# Patient Record
Sex: MALE | Race: WHITE | NOT HISPANIC OR LATINO | Employment: UNEMPLOYED | URBAN - METROPOLITAN AREA
[De-identification: names, ages, dates, MRNs, and addresses within clinical notes are randomized per-mention and may not be internally consistent; named-entity substitution may affect disease eponyms.]

---

## 2017-02-11 ENCOUNTER — OFFICE VISIT (OUTPATIENT)
Dept: URGENT CARE | Facility: URGENT CARE | Age: 32
End: 2017-02-11
Payer: COMMERCIAL

## 2017-02-11 VITALS
DIASTOLIC BLOOD PRESSURE: 102 MMHG | HEART RATE: 107 BPM | TEMPERATURE: 99.2 F | OXYGEN SATURATION: 99 % | SYSTOLIC BLOOD PRESSURE: 150 MMHG

## 2017-02-11 DIAGNOSIS — F41.1 ANXIETY STATE: ICD-10-CM

## 2017-02-11 DIAGNOSIS — R00.2 PALPITATIONS: Primary | ICD-10-CM

## 2017-02-11 LAB
ALBUMIN SERPL-MCNC: 3.7 G/DL (ref 3.4–5)
ALP SERPL-CCNC: 53 U/L (ref 40–150)
ALT SERPL W P-5'-P-CCNC: 16 U/L (ref 0–70)
ANION GAP SERPL CALCULATED.3IONS-SCNC: 4 MMOL/L (ref 3–14)
AST SERPL W P-5'-P-CCNC: 23 U/L (ref 0–45)
BILIRUB SERPL-MCNC: 0.5 MG/DL (ref 0.2–1.3)
BUN SERPL-MCNC: 10 MG/DL (ref 7–30)
CALCIUM SERPL-MCNC: 9.6 MG/DL (ref 8.5–10.1)
CHLORIDE SERPL-SCNC: 104 MMOL/L (ref 94–109)
CO2 SERPL-SCNC: 31 MMOL/L (ref 20–32)
CREAT SERPL-MCNC: 0.6 MG/DL (ref 0.66–1.25)
ERYTHROCYTE [DISTWIDTH] IN BLOOD BY AUTOMATED COUNT: 12.5 % (ref 10–15)
GFR SERPL CREATININE-BSD FRML MDRD: >90 ML/MIN/1.7M2
GLUCOSE SERPL-MCNC: 90 MG/DL (ref 70–99)
HCT VFR BLD AUTO: 45.8 % (ref 40–53)
HGB BLD-MCNC: 15.9 G/DL (ref 13.3–17.7)
MCH RBC QN AUTO: 30.1 PG (ref 26.5–33)
MCHC RBC AUTO-ENTMCNC: 34.7 G/DL (ref 31.5–36.5)
MCV RBC AUTO: 87 FL (ref 78–100)
PLATELET # BLD AUTO: 270 10E9/L (ref 150–450)
POTASSIUM SERPL-SCNC: 4 MMOL/L (ref 3.4–5.3)
PROT SERPL-MCNC: 6.6 G/DL (ref 6.8–8.8)
RBC # BLD AUTO: 5.29 10E12/L (ref 4.4–5.9)
SODIUM SERPL-SCNC: 139 MMOL/L (ref 133–144)
WBC # BLD AUTO: 8 10E9/L (ref 4–11)

## 2017-02-11 PROCEDURE — 36415 COLL VENOUS BLD VENIPUNCTURE: CPT | Performed by: PHYSICIAN ASSISTANT

## 2017-02-11 PROCEDURE — 93000 ELECTROCARDIOGRAM COMPLETE: CPT | Performed by: PHYSICIAN ASSISTANT

## 2017-02-11 PROCEDURE — 80053 COMPREHEN METABOLIC PANEL: CPT | Performed by: PHYSICIAN ASSISTANT

## 2017-02-11 PROCEDURE — 99214 OFFICE O/P EST MOD 30 MIN: CPT | Performed by: PHYSICIAN ASSISTANT

## 2017-02-11 PROCEDURE — 85027 COMPLETE CBC AUTOMATED: CPT | Performed by: PHYSICIAN ASSISTANT

## 2017-02-11 PROCEDURE — 84443 ASSAY THYROID STIM HORMONE: CPT | Performed by: PHYSICIAN ASSISTANT

## 2017-02-11 RX ORDER — ALPRAZOLAM 0.5 MG
0.5 TABLET ORAL 3 TIMES DAILY PRN
Qty: 20 TABLET | Refills: 0 | Status: SHIPPED | OUTPATIENT
Start: 2017-02-11 | End: 2019-02-28

## 2017-02-11 NOTE — MR AVS SNAPSHOT
After Visit Summary   2/11/2017    Keo Pinto    MRN: [id number]           Patient Information     Date Of Birth          1985        Visit Information        Provider Department      2/11/2017 5:00 PM Eri Ruffin PA-C [facility] Urgent [facility]        Today's Diagnoses     Palpitations    -  1    Anxiety state           Follow-ups after your visit        Who to contact     If you have questions or need follow up information about today's clinic visit or your schedule please contact [facility] directly at [phone].  Normal or non-critical lab and imaging results will be communicated to you by [employer], letter or phone within 4 business days after the clinic has received the results. If you do not hear from us within 7 days, please contact the clinic through [employer] or phone. If you have a critical or abnormal lab result, we will notify you by phone as soon as possible.  Submit refill requests through Visual Threat or call your pharmacy and they will forward the refill request to us. Please allow 3 business days for your refill to be completed.          Additional Information About Your Visit        MyChart Information     Visual [employer] gives you secure access to your electronic health record. If you see a primary care provider, you can also send messages to your care team and make appointments. If you have questions, please call your primary care clinic.  If you do not have a primary care provider, please call [phone] and they will assist you.        Care EveryWhere ID     This is your Care EveryWhere ID. This could be used by other organizations to access your [place] medical records  [id number]        Your Vitals Were     Pulse Temperature Pulse Oximetry             107 99.2  F (37.3  C) (Oral) 99%          Blood Pressure from Last 3 Encounters:   02/11/17 (!) 150/102   02/17/16 138/82   04/08/15 136/86    Weight from Last 3 Encounters:   02/17/16 (!) 377 lb 6.4 oz  (171.2 kg)   04/08/15 (!) 367 lb (166.5 kg)   02/19/14 (!) 364 lb (165.1 kg)              We Performed the Following     CBC with platelets     Comprehensive metabolic panel (BMP + Alb, Alk Phos, ALT, AST, Total. Bili, TP)     EKG 12-lead complete w/read - Clinics     TSH with free T4 reflex          Where to get your medicines      Some of these will need a paper prescription and others can be bought over the counter.  Ask your nurse if you have questions.     Bring a paper prescription for each of these medications     ALPRAZolam 0.5 MG tablet          Primary Care Provider Office Phone # Fax #    Tian Perez [phone]       [facility] [address] [place] [address]        Thank you!     Thank you for choosing [facility]  for your care. Our goal is always to provide you with excellent care. Hearing back from our patients is one way we can continue to improve our services. Please take a few minutes to complete the written survey that you may receive in the mail after your visit with us. Thank you!             Your Updated Medication List - Protect others around you: Learn how to safely use, store and throw away your medicines at www.disposemymeds.org.          This list is accurate as of: 2/11/17 11:59 PM.  Always use your most recent med list.                   Brand Name Dispense Instructions for use    ALPRAZolam 0.5 MG tablet    XANAX    20 tablet    Take 1 tablet (0.5 mg) by mouth 3 times daily as needed for anxiety       metoprolol 200 MG 24 hr tablet    TOPROL-XL    90 tablet    Take 1 tablet (200 mg) by mouth daily

## 2017-02-12 LAB — TSH SERPL DL<=0.005 MIU/L-ACNC: 2.51 MU/L (ref 0.4–4)

## 2017-02-28 NOTE — PROGRESS NOTES
SUBJECTIVE:  Keo Pinto is a 31 year old male who comes in with anxiety issues.  Has had in the past. Also feels like rapid heartbeat on occasion that last for 10-15 seconds.  Denies chest pain or SOB>  No cold sx recently.  Wants to make sure fine.  No other concerns.      ROS:  Negative other than stated above    Exam:  GENERAL APPEARANCE: healthy, alert and no distress  EYES: EOMI,  PERRL  HENT: ear canals and TM's normal and nose and mouth without ulcers or lesions  RESP: lungs clear to auscultation - no rales, rhonchi or wheezes  CV: regular rates and rhythm, normal S1 S2, no S3 or S4 and no murmur, click or rub -  ABDOMEN:  soft, nontender, no HSM or masses and bowel sounds normal  SKIN: no suspicious lesions or rashes  NEURO: Normal strength and tone, sensory exam grossly normal, mentation intact and speech normal  PSYCH: anxious    EKG - no irregular rhythm noted    Results for orders placed or performed in visit on 02/11/17   CBC with platelets   Result Value Ref Range    WBC 8.0 4.0 - 11.0 10e9/L    RBC Count 5.29 4.4 - 5.9 10e12/L    Hemoglobin 15.9 13.3 - 17.7 g/dL    Hematocrit 45.8 40.0 - 53.0 %    MCV 87 78 - 100 fl    MCH 30.1 26.5 - 33.0 pg    MCHC 34.7 31.5 - 36.5 g/dL    RDW 12.5 10.0 - 15.0 %    Platelet Count 270 150 - 450 10e9/L   Comprehensive metabolic panel (BMP + Alb, Alk Phos, ALT, AST, Total. Bili, TP)   Result Value Ref Range    Sodium 139 133 - 144 mmol/L    Potassium 4.0 3.4 - 5.3 mmol/L    Chloride 104 94 - 109 mmol/L    Carbon Dioxide 31 20 - 32 mmol/L    Anion Gap 4 3 - 14 mmol/L    Glucose 90 70 - 99 mg/dL    Urea Nitrogen 10 7 - 30 mg/dL    Creatinine 0.60 (L) 0.66 - 1.25 mg/dL    GFR Estimate >90 >60 mL/min/1.7m2    GFR Estimate If Black >90 >60 mL/min/1.7m2    Calcium 9.6 8.5 - 10.1 mg/dL    Bilirubin Total 0.5 0.2 - 1.3 mg/dL    Albumin 3.7 3.4 - 5.0 g/dL    Protein Total 6.6 (L) 6.8 - 8.8 g/dL    Alkaline Phosphatase 53 40 - 150 U/L    ALT 16 0 - 70 U/L    AST 23 0 - 45  U/L   TSH with free T4 reflex   Result Value Ref Range    TSH 2.51 0.40 - 4.00 mU/L         assessment/plan  (R00.2) Palpitations  (primary encounter diagnosis)  Comment:   Plan: EKG 12-lead complete w/read - Clinics, CBC with        platelets, Comprehensive metabolic panel (BMP +        Alb, Alk Phos, ALT, AST, Total. Bili, TP), TSH         with free T4 reflex        Labs normal and normal EKG.  Likely anxiety related.  Needs to FU with PCP for further management.  To ER if worsens    (F41.1) Anxiety state  Comment:   Plan: ALPRAZolam (XANAX) 0.5 MG tablet        Med as directed and limited amount given.  No refills in UC.

## 2017-03-05 ENCOUNTER — MYC REFILL (OUTPATIENT)
Dept: PEDIATRICS | Facility: CLINIC | Age: 32
End: 2017-03-05

## 2017-03-05 DIAGNOSIS — I10 HYPERTENSION: ICD-10-CM

## 2017-03-05 RX ORDER — METOPROLOL SUCCINATE 200 MG/1
200 TABLET, EXTENDED RELEASE ORAL DAILY
Qty: 90 TABLET | Refills: 2 | Status: CANCELLED | OUTPATIENT
Start: 2017-03-05

## 2017-03-06 RX ORDER — METOPROLOL SUCCINATE 200 MG/1
200 TABLET, EXTENDED RELEASE ORAL DAILY
Qty: 30 TABLET | Refills: 0 | Status: SHIPPED | OUTPATIENT
Start: 2017-03-06 | End: 2017-03-14

## 2017-03-06 NOTE — TELEPHONE ENCOUNTER
LOV with  was on 04/08/15 for HTN. Need an OV with provider for BP recheck. Sent [facility] message. Will await for pt's response.     Metoprolol 200 mg qd      Last Written Prescription Date: 05/17/16  Last Fill Quantity: 90, # refills: 2    Last Office Visit with [facility], [facility] or [facility] prescribing provider:  02/11/17   Future Office Visit:        BP Readings from Last 3 Encounters:   02/11/17 (!) 150/102   02/17/16 138/82   04/08/15 136/86     Component      Latest Ref Rng & Units 2/19/2014 4/8/2015 2/11/2017   Creatinine      0.66 - 1.25 mg/dL 0.90 0.96 0.60 (L)     Notes from 02/11/17:  Palpitations (primary encounter diagnosis)  Labs normal and normal EKG. Likely anxiety related. Needs to FU with PCP for further management. To ER if worsens    Boris, RN  Triage Nurse

## 2017-03-06 NOTE — TELEPHONE ENCOUNTER
Pt made an appointment with  on 03/14, says that he has only 3 tabs left on hand, requesting enough med to last till his appointment. Sent one month supply to last till his appointment.     Boris, RN  Triage Nurse

## 2017-03-06 NOTE — TELEPHONE ENCOUNTER
Message from [employer]:  Original authorizing provider: MD Keo Wallace would like a refill of the following medications:  metoprolol (TOPROL-XL) 200 MG 24 hr tablet [Tian Perez MD]    Preferred pharmacy: [address], [place] - [address]    Comment:

## 2017-03-14 ENCOUNTER — OFFICE VISIT (OUTPATIENT)
Dept: PEDIATRICS | Facility: CLINIC | Age: 32
End: 2017-03-14
Payer: COMMERCIAL

## 2017-03-14 VITALS
HEART RATE: 104 BPM | RESPIRATION RATE: 20 BRPM | BODY MASS INDEX: 42.66 KG/M2 | SYSTOLIC BLOOD PRESSURE: 148 MMHG | DIASTOLIC BLOOD PRESSURE: 92 MMHG | WEIGHT: 315 LBS | HEIGHT: 72 IN | TEMPERATURE: 98.1 F

## 2017-03-14 DIAGNOSIS — I10 ESSENTIAL HYPERTENSION: Primary | ICD-10-CM

## 2017-03-14 DIAGNOSIS — F41.1 ANXIETY STATE: ICD-10-CM

## 2017-03-14 PROCEDURE — 99213 OFFICE O/P EST LOW 20 MIN: CPT | Performed by: INTERNAL MEDICINE

## 2017-03-14 RX ORDER — METOPROLOL SUCCINATE 200 MG/1
200 TABLET, EXTENDED RELEASE ORAL DAILY
Qty: 90 TABLET | Refills: 0 | Status: SHIPPED | OUTPATIENT
Start: 2017-03-14 | End: 2017-07-13

## 2017-03-14 RX ORDER — LISINOPRIL 10 MG/1
10 TABLET ORAL DAILY
Qty: 90 TABLET | Refills: 0 | Status: SHIPPED | OUTPATIENT
Start: 2017-03-14 | End: 2017-06-11

## 2017-03-14 NOTE — NURSING NOTE
"Chief Complaint   Patient presents with     Hypertension     ER F/U       Initial BP (!) 152/108 (BP Location: Right arm, Patient Position: Chair, Cuff Size: Adult Large)  Pulse 116  Temp 98.1  F (36.7  C) (Oral)  Resp 20  Ht 5' 11.5\" (1.816 m)  Wt (!) 375 lb (170.1 kg)  BMI 51.57 kg/m2 Estimated body mass index is 51.57 kg/(m^2) as calculated from the following:    Height as of this encounter: 5' 11.5\" (1.816 m).    Weight as of this encounter: 375 lb (170.1 kg).  Medication Reconciliation: complete   Carmencita CAREY, MINGO,AAMA      "

## 2017-03-14 NOTE — MR AVS SNAPSHOT
After Visit Summary   3/14/2017    Keo Pinto    MRN: [id number]           Patient Information     Date Of Birth          1985        Visit Information        Provider Department      3/14/2017 10:00 AM [facility] Perez MD [facility]        Today's Diagnoses     Essential hypertension    -  1    Anxiety state          Care Instructions    Continue to take metoprolol 200 mg once per day    Add Lisinopril 10 mg once per day    BP recheck at the end of the month   Optimal BP readings: <130 / <85   Too high: >140 / >90   Very high: >180 / >105    If your BP is still high, the lisinopril dose can be increased to 20 mg per day         Follow-ups after your visit        Who to contact     If you have questions or need follow up information about today's clinic visit or your schedule please contact [facility] directly at [phone].  Normal or non-critical lab and imaging results will be communicated to you by MyChart, letter or phone within 4 business days after the clinic has received the results. If you do not hear from us within 7 days, please contact the clinic through [employer]t or phone. If you have a critical or abnormal lab result, we will notify you by phone as soon as possible.  Submit refill requests through Known or call your pharmacy and they will forward the refill request to us. Please allow 3 business days for your refill to be completed.          Additional Information About Your Visit        MyChart Information     Known gives you secure access to your electronic health record. If you see a primary care provider, you can also send messages to your care team and make appointments. If you have questions, please call your primary care clinic.  If you do not have a primary care provider, please call [phone] and they will assist you.        Care EveryWhere ID     This is your Care EveryWhere ID. This could be used by other organizations to access your  "[place] medical records  [id number]        Your Vitals Were     Pulse Temperature Respirations Height BMI (Body Mass Index)       104 98.1  F (36.7  C) (Oral) 20 5' 11.5\" (1.816 m) 51.57 kg/m2        Blood Pressure from Last 3 Encounters:   03/14/17 (!) 148/92   02/11/17 (!) 150/102   02/17/16 138/82    Weight from Last 3 Encounters:   03/14/17 (!) 375 lb (170.1 kg)   02/17/16 (!) 377 lb 6.4 oz (171.2 kg)   04/08/15 (!) 367 lb (166.5 kg)              Today, you had the following     No orders found for display         Today's Medication Changes          These changes are accurate as of: 3/14/17 10:37 AM.  If you have any questions, ask your nurse or doctor.               Start taking these medicines.        Dose/Directions    lisinopril 10 MG tablet   Commonly known as:  PRINIVIL/ZESTRIL   Used for:  Essential hypertension   Started by:  Tian Perez MD        Dose:  10 mg   Take 1 tablet (10 mg) by mouth daily   Quantity:  90 tablet   Refills:  0            Where to get your medicines      [id number]  [place] - [address] [facility]  [address] [address]     Phone:  [phone]     lisinopril 10 MG tablet    metoprolol 200 MG 24 hr tablet                Primary Care Provider Office Phone # Fax #    Tian Perez [phone]       [address] [place] [address]        Thank you!     Thank you for choosing [facility]  for your care. Our goal is always to provide you with excellent care. Hearing back from our patients is one way we can continue to improve our services. Please take a few minutes to complete the written survey that you may receive in the mail after your visit with us. Thank you!             Your Updated Medication List - Protect others around you: Learn how to safely use, store and throw away your medicines at www.disposemymeds.org.          This list is " accurate as of: 3/14/17 10:37 AM.  Always use your most recent med list.                   Brand Name Dispense Instructions for use    ALPRAZolam 0.5 MG tablet    XANAX    20 tablet    Take 1 tablet (0.5 mg) by mouth 3 times daily as needed for anxiety       lisinopril 10 MG tablet    PRINIVIL/ZESTRIL    90 tablet    Take 1 tablet (10 mg) by mouth daily       metoprolol 200 MG 24 hr tablet    TOPROL-XL    90 tablet    Take 1 tablet (200 mg) by mouth daily

## 2017-03-14 NOTE — PROGRESS NOTES
"  SUBJECTIVE:                                                    Keo Pinto is a 31 year old male who presents to clinic today for the following health issues:      ED/UC Followup:    Facility:  [facility]  Date of visit: 02/11/2017  Reason for visit: tachycardia and hypertensioin  Current Status: Pt still feels like BP is high. He stated that he can't explain the feeling but does sweat a lot     Felt to have anxiety as well.   Has been treated for anxiety w/ prn alprazolam.       Problem list and histories reviewed & adjusted, as indicated.      Labs reviewed in EPIC    Reviewed and updated as needed this visit by clinical staff  Tobacco  Allergies  Med Hx  Surg Hx  Fam Hx  Soc Hx          OBJECTIVE:                                                    BP (!) 148/92  Pulse 104  Temp 98.1  F (36.7  C) (Oral)  Resp 20  Ht 5' 11.5\" (1.816 m)  Wt (!) 375 lb (170.1 kg)  BMI 51.57 kg/m2  Body mass index is 51.57 kg/(m^2).  GEN: no distress  SKIN: no rashes  HEENT: PERRL. EOMI. TM's clear bilaterally. Nasal mucosa normal. OP moist without lesions.  LUNGS: Clear to auscultation bilaterally. No rhonchi, rales, wheezes or retractions.  CV: Regular rhythm, tachycardic rate.  No murmurs, rubs or gallops. Pulses 2+ radial.  ABD: Bowel sounds positive throughout. Soft, nontender, nondistended. No organomegaly. No masses.   ABD: Bowel sounds positive throughout. Soft, nontender, nondistended. No organomegaly. No masses.  EXTR: No edema     ASSESSMENT/PLAN:                                                        ICD-10-CM    1. Essential hypertension I10 metoprolol (TOPROL-XL) 200 MG 24 hr tablet     lisinopril (PRINIVIL/ZESTRIL) 10 MG tablet   2. Anxiety state F41.1      Patient Instructions   Continue to take metoprolol 200 mg once per day    Add Lisinopril 10 mg once per day    BP recheck at the end of the month   Optimal BP readings: <130 / <85   Too high: >140 / >90   Very high: >180 / >105    If your BP is still high, the " lisinopril dose can be increased to 20 mg per day      Tian Perez MD  [facility]

## 2017-03-14 NOTE — PATIENT INSTRUCTIONS
Continue to take metoprolol 200 mg once per day    Add Lisinopril 10 mg once per day    BP recheck at the end of the month   Optimal BP readings: <130 / <85   Too high: >140 / >90   Very high: >180 / >105    If your BP is still high, the lisinopril dose can be increased to 20 mg per day

## 2017-03-17 ENCOUNTER — MYC MEDICAL ADVICE (OUTPATIENT)
Dept: PEDIATRICS | Facility: CLINIC | Age: 32
End: 2017-03-17

## 2017-04-06 ENCOUNTER — MYC REFILL (OUTPATIENT)
Dept: PEDIATRICS | Facility: CLINIC | Age: 32
End: 2017-04-06

## 2017-04-06 DIAGNOSIS — I10 ESSENTIAL HYPERTENSION: ICD-10-CM

## 2017-04-06 RX ORDER — METOPROLOL SUCCINATE 200 MG/1
200 TABLET, EXTENDED RELEASE ORAL DAILY
Qty: 90 TABLET | Refills: 0 | Status: CANCELLED | OUTPATIENT
Start: 2017-04-06

## 2017-04-06 RX ORDER — LISINOPRIL 10 MG/1
10 TABLET ORAL DAILY
Qty: 90 TABLET | Refills: 0 | Status: CANCELLED | OUTPATIENT
Start: 2017-04-06

## 2017-04-06 NOTE — TELEPHONE ENCOUNTER
Not due for refills now, has enough med to last till June. Advised pt to contact pharmacy to pick-up refill.    Boris, RN  Triage Nurse

## 2017-04-06 NOTE — TELEPHONE ENCOUNTER
Message from [email or web address]:  Original authorizing provider: MD Keo Wallace would like a refill of the following medications:  metoprolol (TOPROL-XL) 200 MG 24 hr tablet [Tian Perez MD]  lisinopril (PRINIVIL/ZESTRIL) 10 MG tablet [Tian Perez MD]    Preferred pharmacy: [facility] [address] AT [facility]    Comment:  My Metoprolol and lisinopril will need refills in a couple days. There is a character limit so I will send another message with my last 30 days of BP readings. Please send to [facility].

## 2017-06-11 DIAGNOSIS — I10 ESSENTIAL HYPERTENSION: ICD-10-CM

## 2017-06-11 NOTE — TELEPHONE ENCOUNTER
lisinopril (PRINIVIL/ZESTRIL) 10 MG tablet      Last Written Prescription Date: 03/14/2017  Last Fill Quantity: 90, # refills: 0  Last Office Visit with [facility], P or [facility] prescribing provider: 03/14/2017       Potassium   Date Value Ref Range Status   02/11/2017 4.0 3.4 - 5.3 mmol/L Final     Creatinine   Date Value Ref Range Status   02/11/2017 0.60 (L) 0.66 - 1.25 mg/dL Final     BP Readings from Last 3 Encounters:   03/14/17 (!) 148/92   02/11/17 (!) 150/102   02/17/16 138/82

## 2017-06-12 RX ORDER — LISINOPRIL 10 MG/1
10 TABLET ORAL DAILY
Qty: 90 TABLET | Refills: 2 | Status: SHIPPED | OUTPATIENT
Start: 2017-06-12 | End: 2018-03-20

## 2017-07-13 ENCOUNTER — MYC MEDICAL ADVICE (OUTPATIENT)
Dept: PEDIATRICS | Facility: CLINIC | Age: 32
End: 2017-07-13

## 2017-07-13 DIAGNOSIS — I10 ESSENTIAL HYPERTENSION: ICD-10-CM

## 2017-07-13 RX ORDER — METOPROLOL SUCCINATE 200 MG/1
200 TABLET, EXTENDED RELEASE ORAL DAILY
Qty: 90 TABLET | Refills: 2 | Status: SHIPPED | OUTPATIENT
Start: 2017-07-13 | End: 2018-03-26

## 2017-07-13 NOTE — TELEPHONE ENCOUNTER
Prescription approved per 's advise below.    Notes from 04/09/17 with home BP monitoring:  These blood pressure readings look great! It is common to have some readings which are a little high. I typically look at the average of the readings and like to see the average <130 systolic and <85 diastolic.   You could change your monitoring to 1-2 times per month if you'd like. If you get higher readings, then check more frequently for a few days.   I recommend that you continue with your current medication doses. Please let me know if you need refills for your prescriptions.     Metoprolol 200 mg qd      Last Written Prescription Date: 03/14/17  Last Fill Quantity: 90, # refills: 0    Last Office Visit with [facility] or [facility] prescribing provider:  03/14/17   Future Office Visit:        BP Readings from Last 3 Encounters:   03/14/17 (!) 148/92   02/11/17 (!) 150/102   02/17/16 138/82     Boris, RN  Triage Nurse

## 2017-10-17 ENCOUNTER — ALLIED HEALTH/NURSE VISIT (OUTPATIENT)
Dept: NURSING | Facility: CLINIC | Age: 32
End: 2017-10-17
Payer: COMMERCIAL

## 2017-10-17 DIAGNOSIS — Z23 NEED FOR PROPHYLACTIC VACCINATION AND INOCULATION AGAINST INFLUENZA: Primary | ICD-10-CM

## 2017-10-17 PROCEDURE — 90471 IMMUNIZATION ADMIN: CPT

## 2017-10-17 PROCEDURE — 99207 ZZC NO CHARGE NURSE ONLY: CPT

## 2017-10-17 PROCEDURE — 90686 IIV4 VACC NO PRSV 0.5 ML IM: CPT

## 2017-10-17 NOTE — MR AVS SNAPSHOT
After Visit Summary   10/17/2017    Keo Pinto    MRN: [id number]           Patient Information     Date Of Birth          1985        Visit Information        Provider Department      10/17/2017 2:00 PM [facility] NURSE [place] Torsten Torres        Today's Diagnoses     Need for prophylactic vaccination and inoculation against influenza    -  1       Follow-ups after your visit        Who to contact     If you have questions or need follow up information about today's clinic visit or your schedule please contact [facility] directly at [phone].  Normal or non-critical lab and imaging results will be communicated to you by [employer], letter or phone within 4 business days after the clinic has received the results. If you do not hear from us within 7 days, please contact the clinic through [employer] or phone. If you have a critical or abnormal lab result, we will notify you by phone as soon as possible.  Submit refill requests through [employer] or call your pharmacy and they will forward the refill request to us. Please allow 3 business days for your refill to be completed.          Additional Information About Your Visit        MyChart Information     [employer] gives you secure access to your electronic health record. If you see a primary care provider, you can also send messages to your care team and make appointments. If you have questions, please call your primary care clinic.  If you do not have a primary care provider, please call [phone] and they will assist you.        Care EveryWhere ID     This is your Care EveryWhere ID. This could be used by other organizations to access your [place] medical records  [id number]         Blood Pressure from Last 3 Encounters:   03/14/17 (!) 148/92   02/11/17 (!) 150/102   02/17/16 138/82    Weight from Last 3 Encounters:   03/14/17 (!) 375 lb (170.1 kg)   02/17/16 (!) 377 lb 6.4 oz (171.2 kg)   04/08/15 (!) 367 lb (166.5 kg)               We Performed the Following     FLU VAC, SPLIT VIRUS IM > 3 YO (QUADRIVALENT) [77815]     Vaccine Administration, Initial [[id number]]        Primary Care Provider Office Phone # Fax #    Tian Perez [phone] [address] [place] [address]        Equal Access to Services     [facility]: Hadii aad ku hadasho Soomaali, waaxda luqadaha, qaybta kaalmada adeegyada, waxay idiin hayaan adeeg sivanvoi layulietn . So [facility] [phone].    ATENCIÓN: Si habla español, tiene a mon disposición servicios gratuitos de asistencia lingüística. [facility] [phone].    We comply with applicable federal civil rights laws and [place] laws. We do not discriminate on the basis of race, color, national origin, age, disability, sex, sexual orientation, or gender identity.            Thank you!     Thank you for choosing [facility] [place]  for your care. Our goal is always to provide you with excellent care. Hearing back from our patients is one way we can continue to improve our services. Please take a few minutes to complete the written survey that you may receive in the mail after your visit with us. Thank you!             Your Updated Medication List - Protect others around you: Learn how to safely use, store and throw away your medicines at www.disposemymeds.org.          This list is accurate as of: 10/17/17  2:04 PM.  Always use your most recent med list.                   Brand Name Dispense Instructions for use Diagnosis    ALPRAZolam 0.5 MG tablet    XANAX    20 tablet    Take 1 tablet (0.5 mg) by mouth 3 times daily as needed for anxiety    Anxiety state       lisinopril 10 MG tablet    PRINIVIL/ZESTRIL    90 tablet    Take 1 tablet (10 mg) by mouth daily    Essential hypertension       metoprolol 200 MG 24 hr tablet    TOPROL-XL    90 tablet    Take 1 tablet (200 mg) by mouth daily    Essential hypertension

## 2017-10-19 ENCOUNTER — MYC MEDICAL ADVICE (OUTPATIENT)
Dept: PEDIATRICS | Facility: CLINIC | Age: 32
End: 2017-10-19

## 2017-10-19 NOTE — TELEPHONE ENCOUNTER
PCP FYI: No action need, please review when you return to office.     Pt called in reporting fever, N/V and low grade HA that developed after he received the flu vaccine on 10/17/17.     Pt reports early Wednesday morning he developed a fever. By Wednesday evening, fever had reached 102 oral.   Today is temp is 99, slowly trending downward.   The Pt also reports episodes of vomiting. Has had two episodes of vomiting today, alongside frequent nausea and upset stomach.   Pt denies feeling dizzy/lightheaded or faint. Denies decrease in urine volume. Reports urine is pale yellow to clear in color. Denies dry mouth.   Took HTN medications yesterday and kept them down. Has not taken them yet today in fear he will vomit them up.   BP was slightly elevated yesterday (Pt did not have exact values available) however is normal today. No reported hypotension.   He will trying taking in some fluids and some soup or toast this morning and then take his medications.   Educated Pt on side effects of flu vaccine; low grade fever, aches, generally feeling not well. Advised his high fever and GI upset unlikely r/t vaccine.     Advised he slowly increase fluids as tolerated, small amounts frequently. Avoid dairy. Could try some Gatorade. Monitor urine status, and any s/s of dehydration.   If symptoms do not resolve in next 24-48 hours, call back into clinic. If dehydration symptoms develops and unable to keep oral fluids down, seek care in ER.     Pt expressed understanding and agrees with plan of care.     Heaven Edwards RN -- [facility]

## 2017-10-21 ENCOUNTER — OFFICE VISIT (OUTPATIENT)
Dept: URGENT CARE | Facility: URGENT CARE | Age: 32
End: 2017-10-21
Payer: COMMERCIAL

## 2017-10-21 VITALS
OXYGEN SATURATION: 95 % | SYSTOLIC BLOOD PRESSURE: 134 MMHG | HEART RATE: 88 BPM | WEIGHT: 315 LBS | BODY MASS INDEX: 50.06 KG/M2 | DIASTOLIC BLOOD PRESSURE: 88 MMHG | RESPIRATION RATE: 14 BRPM | TEMPERATURE: 99.6 F

## 2017-10-21 DIAGNOSIS — R50.9 FEVER, UNSPECIFIED FEVER CAUSE: Primary | ICD-10-CM

## 2017-10-21 LAB
BASOPHILS # BLD AUTO: 0 10E9/L (ref 0–0.2)
BASOPHILS NFR BLD AUTO: 0.4 %
DEPRECATED S PYO AG THROAT QL EIA: NORMAL
DIFFERENTIAL METHOD BLD: NORMAL
EOSINOPHIL # BLD AUTO: 0 10E9/L (ref 0–0.7)
EOSINOPHIL NFR BLD AUTO: 0 %
LYMPHOCYTES # BLD AUTO: 1.4 10E9/L (ref 0.8–5.3)
LYMPHOCYTES NFR BLD AUTO: 25.4 %
MONOCYTES # BLD AUTO: 0.8 10E9/L (ref 0–1.3)
MONOCYTES NFR BLD AUTO: 15.5 %
NEUTROPHILS # BLD AUTO: 3.1 10E9/L (ref 1.6–8.3)
NEUTROPHILS NFR BLD AUTO: 58.7 %
SPECIMEN SOURCE: NORMAL
WBC # BLD AUTO: 5.4 10E9/L (ref 4–11)

## 2017-10-21 PROCEDURE — 87081 CULTURE SCREEN ONLY: CPT | Performed by: PHYSICIAN ASSISTANT

## 2017-10-21 PROCEDURE — 36415 COLL VENOUS BLD VENIPUNCTURE: CPT | Performed by: PHYSICIAN ASSISTANT

## 2017-10-21 PROCEDURE — 99213 OFFICE O/P EST LOW 20 MIN: CPT | Performed by: PHYSICIAN ASSISTANT

## 2017-10-21 PROCEDURE — 85048 AUTOMATED LEUKOCYTE COUNT: CPT | Performed by: PHYSICIAN ASSISTANT

## 2017-10-21 PROCEDURE — 85004 AUTOMATED DIFF WBC COUNT: CPT | Performed by: PHYSICIAN ASSISTANT

## 2017-10-21 PROCEDURE — 87880 STREP A ASSAY W/OPTIC: CPT | Performed by: PHYSICIAN ASSISTANT

## 2017-10-21 NOTE — NURSING NOTE
"Chief Complaint   Patient presents with     Urgent Care     Fever     Pt has had a fever off and on since Wedenesday.  Also c/o that his BP has been high when he has the fever.  Feels that his head is stufffy and he has some post nasal drainage.  Nausea on Wednesday.  Some low back aches on Wednesday but better now.       Initial /88 (BP Location: Right arm, Patient Position: Sitting, Cuff Size: Adult Large)  Pulse 88  Temp 99.6  F (37.6  C) (Oral)  Resp 14  Wt (!) 364 lb (165.1 kg)  SpO2 95%  BMI 50.06 kg/m2 Estimated body mass index is 50.06 kg/(m^2) as calculated from the following:    Height as of 3/14/17: 5' 11.5\" (1.816 m).    Weight as of this encounter: 364 lb (165.1 kg)..  BP completed using cuff size: large  Jodie Eckert R.N.    "

## 2017-10-21 NOTE — MR AVS SNAPSHOT
After Visit Summary   10/21/2017    Keo Pinto    MRN: [id number]           Patient Information     Date Of Birth          1985        Visit Information        Provider Department      10/21/2017 9:10 AM Eri Ruffin PA-C [facility] [place]        Today's Diagnoses     Fever, unspecified fever cause    -  1       Follow-ups after your visit        Who to contact     If you have questions or need follow up information about today's clinic visit or your schedule please contact [facility] directly at [phone].  Normal or non-critical lab and imaging results will be communicated to you by [email or web address], letter or phone within 4 business days after the clinic has received the results. If you do not hear from us within 7 days, please contact the clinic through eKonnektt or phone. If you have a critical or abnormal lab result, we will notify you by phone as soon as possible.  Submit refill requests through [employer] or call your pharmacy and they will forward the refill request to us. Please allow 3 business days for your refill to be completed.          Additional Information About Your Visit        MyChart Information     [employer] gives you secure access to your electronic health record. If you see a primary care provider, you can also send messages to your care team and make appointments. If you have questions, please call your primary care clinic.  If you do not have a primary care provider, please call [phone] and they will assist you.        Care EveryWhere ID     This is your Care EveryWhere ID. This could be used by other organizations to access your [facility] medical records  [id number]        Your Vitals Were     Pulse Temperature Respirations Pulse Oximetry BMI (Body Mass Index)       88 99.6  F (37.6  C) (Oral) 14 95% 50.06 kg/m2        Blood Pressure from Last 3 Encounters:   10/21/17 134/88   03/14/17 (!) 148/92   02/11/17 (!) 150/102    Weight from  Last 3 Encounters:   10/21/17 (!) 364 lb (165.1 kg)   03/14/17 (!) 375 lb (170.1 kg)   02/17/16 (!) 377 lb 6.4 oz (171.2 kg)              We Performed the Following     Beta strep group A culture     Strep, Rapid Screen     WBC with Diff        Primary Care Provider Office Phone # Fax #    Tian Perez [phone] [address] [place] [address]        Equal Access to Services     [facility] : Hadii aad ku hadasho Soomaali, waaxda luqadaha, qaybta kaalmada adeegyada, waxay idiin hayaan cristal jerez . So [facility] [phone].    ATENCIÓN: Si habla español, tiene a mon disposición servicios gratuitos de asistencia lingüística. Llame al [phone].    We comply with applicable federal civil rights laws and [place] laws. We do not discriminate on the basis of race, color, national origin, age, disability, sex, sexual orientation, or gender identity.            Thank you!     Thank you for choosing [facility]  for your care. Our goal is always to provide you with excellent care. Hearing back from our patients is one way we can continue to improve our services. Please take a few minutes to complete the written survey that you may receive in the mail after your visit with us. Thank you!             Your Updated Medication List - Protect others around you: Learn how to safely use, store and throw away your medicines at www.disposemymeds.org.          This list is accurate as of: 10/21/17 11:59 PM.  Always use your most recent med list.                   Brand Name Dispense Instructions for use Diagnosis    ALPRAZolam 0.5 MG tablet    XANAX    20 tablet    Take 1 tablet (0.5 mg) by mouth 3 times daily as needed for anxiety    Anxiety state       lisinopril 10 MG tablet    PRINIVIL/ZESTRIL    90 tablet    Take 1 tablet (10 mg) by mouth daily    Essential hypertension       metoprolol 200 MG 24 hr tablet    TOPROL-XL    90 tablet    Take 1 tablet (200 mg) by  mouth daily    Essential hypertension

## 2017-10-21 NOTE — PROGRESS NOTES
SUBJECTIVE:   Keo Pinto is a 32 year old male presenting with a chief complaint of fever on and off for the past 4 days.  Up to 103 early on.   Had flu shot the day before sx began.  V x 2.  Mild sinus congestion.  No cough, SOB or chest pain abdominal pain, UTI sx, rashes or HA.  Very mild ST.  Right ear with some tenderness.    Onset of symptoms was 4 day(s) ago.  Course of illness is same.    Severity moderate  Current and Associated symptoms: negative other than stated above  Treatment measures tried include Tylenol/Ibuprofen, Fluids and Rest.  Predisposing factors include no underlying issus to cause sx.    Past Medical History:   Diagnosis Date     Esophageal reflux      Hypertension      Migraine headache      Current Outpatient Prescriptions   Medication Sig Dispense Refill     metoprolol (TOPROL-XL) 200 MG 24 hr tablet Take 1 tablet (200 mg) by mouth daily 90 tablet 2     lisinopril (PRINIVIL/ZESTRIL) 10 MG tablet Take 1 tablet (10 mg) by mouth daily 90 tablet 2     ALPRAZolam (XANAX) 0.5 MG tablet Take 1 tablet (0.5 mg) by mouth 3 times daily as needed for anxiety 20 tablet 0     Social History   Substance Use Topics     Smoking status: Never Smoker     Smokeless tobacco: Never Used     Alcohol use Yes      Comment: Rare       ROS:  Review of systems negative except as stated above.    OBJECTIVE:  /88 (BP Location: Right arm, Patient Position: Sitting, Cuff Size: Adult Large)  Pulse 88  Temp 99.6  F (37.6  C) (Oral)  Resp 14  Wt (!) 364 lb (165.1 kg)  SpO2 95%  BMI 50.06 kg/m2  GENERAL APPEARANCE: healthy, alert and no distress  EYES: EOMI,  PERRL, conjunctiva clear  HENT: ear canals and TM's normal.  Nose and mouth without ulcers, erythema or lesions  NECK: supple, nontender, no lymphadenopathy.  FROM and no rigidity.    RESP: lungs clear to auscultation - no rales, rhonchi or wheezes  CV: regular rates and rhythm, normal S1 S2, no murmur noted  ABDOMEN:  soft, nontender, no HSM or  masses and bowel sounds normal  NEURO: Normal strength and tone, sensory exam grossly normal,  normal speech and mentation  SKIN: no suspicious lesions or rashes    Results for orders placed or performed in visit on 10/21/17   WBC with Diff   Result Value Ref Range    WBC 5.4 4.0 - 11.0 10e9/L    Diff Method Automated Method     % Neutrophils 58.7 %    % Lymphocytes 25.4 %    % Monocytes 15.5 %    % Eosinophils 0.0 %    % Basophils 0.4 %    Absolute Neutrophil 3.1 1.6 - 8.3 10e9/L    Absolute Lymphocytes 1.4 0.8 - 5.3 10e9/L    Absolute Monocytes 0.8 0.0 - 1.3 10e9/L    Absolute Eosinophils 0.0 0.0 - 0.7 10e9/L    Absolute Basophils 0.0 0.0 - 0.2 10e9/L   Strep, Rapid Screen   Result Value Ref Range    Specimen Description Throat     Rapid Strep A Screen       NEGATIVE: No Group A streptococcal antigen detected by immunoassay, await culture report.   Beta strep group A culture   Result Value Ref Range    Specimen Description Throat     Culture Micro No beta hemolytic Streptococcus Group A isolated        assessment/plan:  (R50.9) Fever, unspecified fever cause  (primary encounter diagnosis)  Comment:   Plan: Strep, Rapid Screen, WBC with Diff, Beta strep         group A culture          Patient appears well and no red flag signs with normal vitals.  Labs reassuring and more of a viral picture.  OTC med for sx relief and red flag signs discussed.  FU with PCP as needed if fevers worsen, chest pain, SOB or new sx develop

## 2017-10-22 LAB
BACTERIA SPEC CULT: NORMAL
SPECIMEN SOURCE: NORMAL

## 2018-03-20 ENCOUNTER — TELEPHONE (OUTPATIENT)
Dept: PEDIATRICS | Facility: CLINIC | Age: 33
End: 2018-03-20

## 2018-03-20 DIAGNOSIS — I10 ESSENTIAL HYPERTENSION: ICD-10-CM

## 2018-03-21 ENCOUNTER — MYC MEDICAL ADVICE (OUTPATIENT)
Dept: PEDIATRICS | Facility: CLINIC | Age: 33
End: 2018-03-21

## 2018-03-21 ENCOUNTER — TELEPHONE (OUTPATIENT)
Dept: PEDIATRICS | Facility: CLINIC | Age: 33
End: 2018-03-21

## 2018-03-21 DIAGNOSIS — Z13.6 CARDIOVASCULAR SCREENING; LDL GOAL LESS THAN 160: Primary | ICD-10-CM

## 2018-03-21 DIAGNOSIS — I10 ESSENTIAL HYPERTENSION: ICD-10-CM

## 2018-03-21 RX ORDER — LISINOPRIL 10 MG/1
10 TABLET ORAL DAILY
Qty: 30 TABLET | Refills: 0 | Status: SHIPPED | OUTPATIENT
Start: 2018-03-21 | End: 2018-03-26

## 2018-03-21 NOTE — TELEPHONE ENCOUNTER
See pt's MC message. Lisinopril was refilled today for 30#. Pt has an appointment for px on 03/26. Pt will be due for fasting labs(last lipids in 2014). Placed future lab orders for lipids & BMP. Advised pt to get fasting labs this week by making lab-only appointment, if he prefers to.     Boris, RN  Triage Nurse

## 2018-03-22 DIAGNOSIS — I10 ESSENTIAL HYPERTENSION: ICD-10-CM

## 2018-03-22 DIAGNOSIS — Z13.6 CARDIOVASCULAR SCREENING; LDL GOAL LESS THAN 160: ICD-10-CM

## 2018-03-22 PROCEDURE — 80048 BASIC METABOLIC PNL TOTAL CA: CPT | Performed by: INTERNAL MEDICINE

## 2018-03-22 PROCEDURE — 36415 COLL VENOUS BLD VENIPUNCTURE: CPT | Performed by: INTERNAL MEDICINE

## 2018-03-22 PROCEDURE — 80061 LIPID PANEL: CPT | Performed by: INTERNAL MEDICINE

## 2018-03-23 LAB
ANION GAP SERPL CALCULATED.3IONS-SCNC: 6 MMOL/L (ref 3–14)
BUN SERPL-MCNC: 7 MG/DL (ref 7–30)
CALCIUM SERPL-MCNC: 8.5 MG/DL (ref 8.5–10.1)
CHLORIDE SERPL-SCNC: 105 MMOL/L (ref 94–109)
CHOLEST SERPL-MCNC: 120 MG/DL
CO2 SERPL-SCNC: 28 MMOL/L (ref 20–32)
CREAT SERPL-MCNC: 0.84 MG/DL (ref 0.66–1.25)
GFR SERPL CREATININE-BSD FRML MDRD: >90 ML/MIN/1.7M2
GLUCOSE SERPL-MCNC: 83 MG/DL (ref 70–99)
HDLC SERPL-MCNC: 39 MG/DL
LDLC SERPL CALC-MCNC: 40 MG/DL
NONHDLC SERPL-MCNC: 81 MG/DL
POTASSIUM SERPL-SCNC: 3.8 MMOL/L (ref 3.4–5.3)
SODIUM SERPL-SCNC: 139 MMOL/L (ref 133–144)
TRIGL SERPL-MCNC: 203 MG/DL

## 2018-03-23 ASSESSMENT — ENCOUNTER SYMPTOMS
ARTHRALGIAS: 0
NERVOUS/ANXIOUS: 0
DYSURIA: 0
COUGH: 0
PALPITATIONS: 0
EYE PAIN: 0
HEMATOCHEZIA: 0
CONSTIPATION: 0
SHORTNESS OF BREATH: 0
FREQUENCY: 0
DIARRHEA: 0
SORE THROAT: 0
HEADACHES: 0
ABDOMINAL PAIN: 0
NAUSEA: 0
CHILLS: 0
DIZZINESS: 0
JOINT SWELLING: 0
PARESTHESIAS: 0
FEVER: 0
MYALGIAS: 0
HEMATURIA: 0
WEAKNESS: 0
HEARTBURN: 0

## 2018-03-26 ENCOUNTER — OFFICE VISIT (OUTPATIENT)
Dept: PEDIATRICS | Facility: CLINIC | Age: 33
End: 2018-03-26
Payer: COMMERCIAL

## 2018-03-26 VITALS
HEIGHT: 71 IN | SYSTOLIC BLOOD PRESSURE: 134 MMHG | BODY MASS INDEX: 44.1 KG/M2 | WEIGHT: 315 LBS | HEART RATE: 80 BPM | OXYGEN SATURATION: 97 % | TEMPERATURE: 97.8 F | DIASTOLIC BLOOD PRESSURE: 80 MMHG

## 2018-03-26 DIAGNOSIS — E66.01 MORBID OBESITY (H): ICD-10-CM

## 2018-03-26 DIAGNOSIS — I10 ESSENTIAL HYPERTENSION: ICD-10-CM

## 2018-03-26 DIAGNOSIS — Z00.00 ROUTINE GENERAL MEDICAL EXAMINATION AT A HEALTH CARE FACILITY: Primary | ICD-10-CM

## 2018-03-26 PROCEDURE — 99395 PREV VISIT EST AGE 18-39: CPT | Performed by: INTERNAL MEDICINE

## 2018-03-26 RX ORDER — LISINOPRIL 10 MG/1
10 TABLET ORAL DAILY
Qty: 90 TABLET | Refills: 3 | Status: SHIPPED | OUTPATIENT
Start: 2018-03-26 | End: 2019-01-28

## 2018-03-26 RX ORDER — METOPROLOL SUCCINATE 200 MG/1
200 TABLET, EXTENDED RELEASE ORAL DAILY
Qty: 90 TABLET | Refills: 3 | Status: SHIPPED | OUTPATIENT
Start: 2018-03-26 | End: 2019-01-28

## 2018-03-26 ASSESSMENT — ENCOUNTER SYMPTOMS
HEMATURIA: 0
HEMATOCHEZIA: 0
MYALGIAS: 0
ARTHRALGIAS: 0
FEVER: 0
SORE THROAT: 0
DIZZINESS: 0
PARESTHESIAS: 0
PALPITATIONS: 0
WEAKNESS: 0
HEADACHES: 0
SHORTNESS OF BREATH: 0
HEARTBURN: 0
EYE PAIN: 0
DIARRHEA: 0
JOINT SWELLING: 0
NAUSEA: 0
CHILLS: 0
DYSURIA: 0
CONSTIPATION: 0
ABDOMINAL PAIN: 0
FREQUENCY: 0
COUGH: 0
NERVOUS/ANXIOUS: 0

## 2018-03-26 NOTE — MR AVS SNAPSHOT
After Visit Summary   3/26/2018    Keo Pinto    MRN: [id number]           Patient Information     Date Of Birth          1985        Visit Information        Provider Department      3/26/2018 2:00 PM [facility]        Today's Diagnoses     Routine general medical examination at a health care facility    -  1    Essential hypertension          Care Instructions      Preventive Health Recommendations    Yearly exam:             See your health care provider every year in order to  o   Review health changes.   o   Discuss preventive care.      o   Review your medicines.     Have your cholesterol tested at least every 5 years.     Have a diabetes test (fasting glucose) every 1-2 years.  Shots: Get a flu shot each year. Get a tetanus shot every 10 years.     Nutrition:    Eat at least 5 servings of fruits and vegetables daily.     Eat whole-grain bread, whole-wheat pasta and brown rice instead of white grains and rice.     Get adequate Calcium and Vitamin D.     Lifestyle    Exercise for at least 150 minutes a week (30 minutes a day, 5 days a week). This will help you control your weight and prevent disease.     Limit alcohol to one drink per day.     No smoking.     Wear sunscreen to prevent skin cancer.     See your dentist every six months for an exam and cleaning.             Follow-ups after your visit        Who to contact     If you have questions or need follow up information about today's clinic visit or your schedule please contact [facility] directly at [phone].  Normal or non-critical lab and imaging results will be communicated to you by MyChart, letter or phone within 4 business days after the clinic has received the results. If you do not hear from us within 7 days, please contact the clinic through MyChart or phone. If you have a critical or abnormal lab result, we will notify you by phone as soon as possible.  Submit refill  "requests through [employer] or call your pharmacy and they will forward the refill request to us. Please allow 3 business days for your refill to be completed.          Additional Information About Your Visit        Pureshieldhart Information     [employer] gives you secure access to your electronic health record. If you see a primary care provider, you can also send messages to your care team and make appointments. If you have questions, please call your primary care clinic.  If you do not have a primary care provider, please call [phone] and they will assist you.        Care EveryWhere ID     This is your Care EveryWhere ID. This could be used by other organizations to access your [facility] medical records  [id number]        Your Vitals Were     Pulse Temperature Height Pulse Oximetry BMI (Body Mass Index)       80 97.8  F (36.6  C) (Oral) 5' 11.5\" (1.816 m) 97% 53.17 kg/m2        Blood Pressure from Last 3 Encounters:   03/26/18 134/80   10/21/17 134/88   03/14/17 (!) 148/92    Weight from Last 3 Encounters:   03/26/18 (!) 386 lb 9.6 oz (175.4 kg)   10/21/17 (!) 364 lb (165.1 kg)   03/14/17 (!) 375 lb (170.1 kg)              Today, you had the following     No orders found for display         Today's Medication Changes          These changes are accurate as of 3/26/18  2:21 PM.  If you have any questions, ask your nurse or doctor.               These medicines have changed or have updated prescriptions.        Dose/Directions    lisinopril 10 MG tablet   Commonly known as:  PRINIVIL/ZESTRIL   This may have changed:  additional instructions   Used for:  Essential hypertension   Changed by:  Tian Perez MD        Dose:  10 mg   Take 1 tablet (10 mg) by mouth daily   Quantity:  90 tablet   Refills:  3            Where to get your medicines      These medications were sent to [facility] [id number] [facility]  [address] [address]     Phone:  [phone]    "  lisinopril 10 MG tablet    metoprolol succinate 200 MG 24 hr tablet                Primary Care Provider Office Phone # Fax #    Tian Perez [phone] [address] [place] [address]        Equal Access to Services     JEANINESTEVEN DEB : Hadii aad ku hadbamo Soomaali, waaxda luqadaha, qaybta kaalmada adeegyada, waxay idiin haycristyn adeisaias batista layulietmir . So [facility] [phone].    ATENCIÓN: Si habla español, tiene a mon disposición servicios gratuitos de asistencia lingüística. Llame al [phone].    We comply with applicable federal civil rights laws and [place] laws. We do not discriminate on the basis of race, color, national origin, age, disability, sex, sexual orientation, or gender identity.            Thank you!     Thank you for choosing [facility]  for your care. Our goal is always to provide you with excellent care. Hearing back from our patients is one way we can continue to improve our services. Please take a few minutes to complete the written survey that you may receive in the mail after your visit with us. Thank you!             Your Updated Medication List - Protect others around you: Learn how to safely use, store and throw away your medicines at www.disposemymeds.org.          This list is accurate as of 3/26/18  2:21 PM.  Always use your most recent med list.                   Brand Name Dispense Instructions for use Diagnosis    ALPRAZolam 0.5 MG tablet    XANAX    20 tablet    Take 1 tablet (0.5 mg) by mouth 3 times daily as needed for anxiety    Anxiety state       lisinopril 10 MG tablet    PRINIVIL/ZESTRIL    90 tablet    Take 1 tablet (10 mg) by mouth daily    Essential hypertension       metoprolol succinate 200 MG 24 hr tablet    TOPROL-XL    90 tablet    Take 1 tablet (200 mg) by mouth daily    Essential hypertension

## 2018-03-26 NOTE — PROGRESS NOTES
SUBJECTIVE:   CC: Keo Pinto is an 32 year old male who presents for preventative health visit.     Physical   Annual:     Getting at least 3 servings of Calcium per day::  Yes    Bi-annual eye exam::  NO    Dental care twice a year::  Yes    Sleep apnea or symptoms of sleep apnea::  None    Diet::  Low salt    Frequency of exercise::  2-3 days/week    Duration of exercise::  15-30 minutes    Taking medications regularly::  Yes    Medication side effects::  None    Additional concerns today::  No          HTN. No cardiac sx such as CP, palpitations, PND, orthopnea, PISANO or peripheral edema.   Checks BP at home 1-2 times per week - typically under 130 systolic.     Hx of anxiety. Well controlled. Does not take alprazolam currently.     Today's PHQ-2 Score:   PHQ-2 ( 1999 Pfizer) 3/23/2018   Q1: Little interest or pleasure in doing things 0   Q2: Feeling down, depressed or hopeless 0   PHQ-2 Score 0   Q1: Little interest or pleasure in doing things Not at all   Q2: Feeling down, depressed or hopeless Not at all   PHQ-2 Score 0       Abuse: Current or Past(Physical, Sexual or Emotional)- No  Do you feel safe in your environment - Yes    Social History   Substance Use Topics     Smoking status: Never Smoker     Smokeless tobacco: Never Used     Alcohol use Yes      Comment: Rare     Alcohol Use 3/23/2018   If you drink alcohol do you typically have greater than 3 drinks per day OR greater than 7 drinks per week? No       Reviewed orders with patient. Reviewed health maintenance and updated orders accordingly - Yes  Labs reviewed in EPIC    Reviewed and updated as needed this visit by Provider  Tobacco  Allergies  Meds  Med Hx  Surg Hx  Fam Hx  Soc Hx           Review of Systems   Constitutional: Negative for chills and fever.   HENT: Negative for congestion, ear pain, hearing loss and sore throat.    Eyes: Negative for pain and visual disturbance.   Respiratory: Negative for cough and shortness of breath.   "  Cardiovascular: Negative for chest pain, palpitations and peripheral edema.   Gastrointestinal: Negative for abdominal pain, constipation, diarrhea, heartburn, hematochezia and nausea.   Genitourinary: Negative for discharge, dysuria, frequency, genital sores, hematuria, impotence and urgency.   Musculoskeletal: Negative for arthralgias, joint swelling and myalgias.   Skin: Negative for rash.   Neurological: Negative for dizziness, weakness, headaches and paresthesias.   Psychiatric/Behavioral: Negative for mood changes. The patient is not nervous/anxious.        OBJECTIVE:   /80  Pulse 80  Temp 97.8  F (36.6  C) (Oral)  Ht 5' 11.5\" (1.816 m)  Wt (!) 386 lb 9.6 oz (175.4 kg)  SpO2 97%  BMI 53.17 kg/m2    Physical Exam  GENERAL: healthy, alert and no distress  EYES: Eyes grossly normal to inspection, PERRL and conjunctivae and sclerae normal  HENT: ear canals and TM's normal, nose and mouth without ulcers or lesions  NECK: no adenopathy, no asymmetry, masses, or scars and thyroid normal to palpation  RESP: lungs clear to auscultation - no rales, rhonchi or wheezes  CV: regular rate and rhythm, normal S1 S2, no S3 or S4, no murmur, click or rub, no peripheral edema and peripheral pulses strong  ABDOMEN: soft, nontender, no hepatosplenomegaly, no masses and bowel sounds normal  MS: no gross musculoskeletal defects noted, no edema  SKIN: no suspicious lesions or rashes  NEURO: Normal strength and tone, mentation intact and speech normal  PSYCH: mentation appears normal, affect normal/bright    ASSESSMENT/PLAN:       ICD-10-CM    1. Routine general medical examination at a health care facility Z00.00    2. Essential hypertension I10 lisinopril (PRINIVIL/ZESTRIL) 10 MG tablet     metoprolol succinate (TOPROL-XL) 200 MG 24 hr tablet   3. Morbid obesity (H) E66.01        COUNSELING:   Reviewed preventive health counseling, as reflected in patient instructions         reports that he has never smoked. He has " "never used smokeless tobacco.    Estimated body mass index is 50.06 kg/(m^2) as calculated from the following:    Height as of 3/14/17: 5' 11.5\" (1.816 m).    Weight as of 10/21/17: 364 lb (165.1 kg).   Weight management plan: Discussed healthy diet and exercise guidelines and patient will follow up in 12 months in clinic to re-evaluate.    Tian Perez MD  [facility]    "

## 2018-03-26 NOTE — PATIENT INSTRUCTIONS
Preventive Health Recommendations    Yearly exam:             See your health care provider every year in order to  o   Review health changes.   o   Discuss preventive care.      o   Review your medicines.     Have your cholesterol tested at least every 5 years.     Have a diabetes test (fasting glucose) every 1-2 years.  Shots: Get a flu shot each year. Get a tetanus shot every 10 years.     Nutrition:    Eat at least 5 servings of fruits and vegetables daily.     Eat whole-grain bread, whole-wheat pasta and brown rice instead of white grains and rice.     Get adequate Calcium and Vitamin D.     Lifestyle    Exercise for at least 150 minutes a week (30 minutes a day, 5 days a week). This will help you control your weight and prevent disease.     Limit alcohol to one drink per day.     No smoking.     Wear sunscreen to prevent skin cancer.     See your dentist every six months for an exam and cleaning.

## 2018-05-22 ENCOUNTER — NURSE TRIAGE (OUTPATIENT)
Dept: NURSING | Facility: CLINIC | Age: 33
End: 2018-05-22

## 2018-05-22 ENCOUNTER — OFFICE VISIT (OUTPATIENT)
Dept: PEDIATRICS | Facility: CLINIC | Age: 33
End: 2018-05-22
Payer: COMMERCIAL

## 2018-05-22 VITALS
WEIGHT: 315 LBS | SYSTOLIC BLOOD PRESSURE: 140 MMHG | DIASTOLIC BLOOD PRESSURE: 88 MMHG | TEMPERATURE: 99.2 F | HEIGHT: 72 IN | BODY MASS INDEX: 42.66 KG/M2 | OXYGEN SATURATION: 97 % | HEART RATE: 90 BPM

## 2018-05-22 DIAGNOSIS — I49.1 PREMATURE CONTRACTIONS, ATRIAL: ICD-10-CM

## 2018-05-22 DIAGNOSIS — R00.2 HEART PALPITATIONS: Primary | ICD-10-CM

## 2018-05-22 LAB
ANION GAP SERPL CALCULATED.3IONS-SCNC: 8 MMOL/L (ref 3–14)
BUN SERPL-MCNC: 9 MG/DL (ref 7–30)
CALCIUM SERPL-MCNC: 9.2 MG/DL (ref 8.5–10.1)
CHLORIDE SERPL-SCNC: 105 MMOL/L (ref 94–109)
CO2 SERPL-SCNC: 26 MMOL/L (ref 20–32)
CREAT SERPL-MCNC: 1.01 MG/DL (ref 0.66–1.25)
ERYTHROCYTE [DISTWIDTH] IN BLOOD BY AUTOMATED COUNT: 12.8 % (ref 10–15)
GFR SERPL CREATININE-BSD FRML MDRD: 85 ML/MIN/1.7M2
GLUCOSE SERPL-MCNC: 94 MG/DL (ref 70–99)
HCT VFR BLD AUTO: 46.4 % (ref 40–53)
HGB BLD-MCNC: 16 G/DL (ref 13.3–17.7)
MAGNESIUM SERPL-MCNC: 2.2 MG/DL (ref 1.6–2.3)
MCH RBC QN AUTO: 30.4 PG (ref 26.5–33)
MCHC RBC AUTO-ENTMCNC: 34.5 G/DL (ref 31.5–36.5)
MCV RBC AUTO: 88 FL (ref 78–100)
PLATELET # BLD AUTO: 264 10E9/L (ref 150–450)
POTASSIUM SERPL-SCNC: 4.4 MMOL/L (ref 3.4–5.3)
RBC # BLD AUTO: 5.26 10E12/L (ref 4.4–5.9)
SODIUM SERPL-SCNC: 139 MMOL/L (ref 133–144)
TSH SERPL DL<=0.005 MIU/L-ACNC: 3.03 MU/L (ref 0.4–4)
WBC # BLD AUTO: 9 10E9/L (ref 4–11)

## 2018-05-22 PROCEDURE — 99214 OFFICE O/P EST MOD 30 MIN: CPT | Performed by: INTERNAL MEDICINE

## 2018-05-22 PROCEDURE — 93000 ELECTROCARDIOGRAM COMPLETE: CPT | Performed by: INTERNAL MEDICINE

## 2018-05-22 PROCEDURE — 84443 ASSAY THYROID STIM HORMONE: CPT | Performed by: INTERNAL MEDICINE

## 2018-05-22 PROCEDURE — 80048 BASIC METABOLIC PNL TOTAL CA: CPT | Performed by: INTERNAL MEDICINE

## 2018-05-22 PROCEDURE — 85027 COMPLETE CBC AUTOMATED: CPT | Performed by: INTERNAL MEDICINE

## 2018-05-22 PROCEDURE — 36415 COLL VENOUS BLD VENIPUNCTURE: CPT | Performed by: INTERNAL MEDICINE

## 2018-05-22 PROCEDURE — 83735 ASSAY OF MAGNESIUM: CPT | Performed by: INTERNAL MEDICINE

## 2018-05-22 NOTE — PATIENT INSTRUCTIONS
I'm guessing that much of this is due to frequent premature atrial complexes (PACs).    We will check some labs, but I would like for you to follow-up with Cardiology as you are having these relatively frequently and they are quite symptomatic.    In the meantime, avoid all alcohol and caffeine as these can make things worse.    If you develop worsening symptoms, go to the ER for further evaluation.

## 2018-05-22 NOTE — PROGRESS NOTES
SUBJECTIVE:   Keo Pinto is a 32 year old male who presents to clinic today for the following health issues:    Palpitations: Keo comes in for evaluation of rapid heart beat. He reports that he has a history of intermittent palpitations, which typically last for about 10-15 seconds and then resolve, typically occurring about 2-3 times per year. However, this morning, he developed a rapid heart beat that lasted for about 30-40 minutes. He could definitely feel the palpitations, and also had some associated shortness of breath which he attributes to possible anxiety. No chest pains with this. He was driving into urgent care to get this checked out when his symptoms resolved, prompting him to go home and call to schedule an appointment as urgent care was not yet open.     He denied any lightheadedness or dizziness, and reports that he drinks alcohol minimally. He does not typically drink caffeine.     He is on metoprolol XL 200mg daily for hypertension and a history of palpitations that were thought to be consistent with PAC/PVCs and anxiety based on prior holter.     Problem list and histories reviewed & adjusted, as indicated.  Additional history: as documented    Patient Active Problem List   Diagnosis     Anxiety state     Esophageal reflux     Insomnia     Hypertension     CARDIOVASCULAR SCREENING; LDL GOAL LESS THAN 160     Morbid obesity (H)     Past Surgical History:   Procedure Laterality Date     ADENOIDECTOMY       PE TUBES      As a young child       Social History   Substance Use Topics     Smoking status: Never Smoker     Smokeless tobacco: Never Used     Alcohol use Yes      Comment: Rare     Family History   Problem Relation Age of Onset     Neurologic Disorder Mother      Parkinsons, Migraine HA's     Hypertension Father      Alcohol/Drug Father      HEART DISEASE Maternal Grandfather      MI age 52     HEART DISEASE Paternal Grandfather      MI age 70's           Reviewed and updated as  "needed this visit by clinical staff  Tobacco  Allergies  Meds  Med Hx  Fam Hx  Soc Hx      ROS:  Constitutional, HEENT, cardiovascular, pulmonary, gi  systems are negative, except as otherwise noted.    OBJECTIVE:     /88 (BP Location: Right arm, Patient Position: Chair, Cuff Size: Adult Large)  Pulse 90  Temp 99.2  F (37.3  C) (Tympanic)  Ht 5' 11.5\" (1.816 m)  Wt (!) 383 lb 4 oz (173.8 kg)  SpO2 97%  BMI 52.71 kg/m2  Body mass index is 52.71 kg/(m^2).  GENERAL: healthy, alert, no distress and obese  EYES: Eyes grossly normal to inspection, PERRL and conjunctivae and sclerae normal  HENT: normal cephalic/atraumatic, nose and mouth without ulcers or lesions, oropharynx clear and oral mucous membranes moist  NECK: no adenopathy, no asymmetry  RESP: lungs clear to auscultation - no rales, rhonchi or wheezes  CV: occasional dropped beat but otherwise regular, no appreciable murmurs    Diagnostic Test Results:  EKG - occasional PACs    ASSESSMENT/PLAN:       ICD-10-CM    1. Heart palpitations R00.2 EKG 12-lead complete w/read - Clinics     CBC with platelets     TSH with free T4 reflex     Magnesium     Basic metabolic panel  (Ca, Cl, CO2, Creat, Gluc, K, Na, BUN)     CARDIOLOGY EVAL ADULT REFERRAL   2. Premature contractions, atrial I49.1 CBC with platelets     TSH with free T4 reflex     Magnesium     Basic metabolic panel  (Ca, Cl, CO2, Creat, Gluc, K, Na, BUN)     CARDIOLOGY EVAL ADULT REFERRAL     Recent symptomatic run of palpitations resolved. May be due to PACs/PVCs, as he has known hx of these and has noted PACs on both ECG and exam today. However, as patient is asymptomatic with his current burden, concerning that he may have had a different type of arrhythmia, such as SVT. VT interval is not shortened, making WPW less likely. Will obtain basic labs today, discussed that if these are normal may want to consider follow-up with Cardiology as he continues to have significant PACs despite being on " max dose metoprolol and avoiding caffeine and alcohol, particularly if he has recurrent palpitations.     Patient Instructions   I'm guessing that much of this is due to frequent premature atrial complexes (PACs).    We will check some labs, but I would like for you to follow-up with Cardiology as you are having these relatively frequently and they are quite symptomatic.    In the meantime, avoid all alcohol and caffeine as these can make things worse.    If you develop worsening symptoms, go to the ER for further evaluation.       Meryl Medina MD  [facility]

## 2018-05-22 NOTE — MR AVS SNAPSHOT
After Visit Summary   5/22/2018    Keo Pinto    MRN: [id number]           Patient Information     Date Of Birth          1985        Visit Information        Provider Department      5/22/2018 8:50 AM [facility]        Today's Diagnoses     Heart palpitations    -  1    Premature contractions, atrial          Care Instructions    I'm guessing that much of this is due to frequent premature atrial complexes (PACs).    We will check some labs, but I would like for you to follow-up with Cardiology as you are having these relatively frequently and they are quite symptomatic.    In the meantime, avoid all alcohol and caffeine as these can make things worse.    If you develop worsening symptoms, go to the ER for further evaluation.           Follow-ups after your visit        Additional Services     CARDIOLOGY EVAL ADULT REFERRAL       Your provider has referred you to:  Melissa [facility] ([phone]) [email or web address]    Please be aware that coverage of these services is subject to the terms and limitations of your health insurance plan.  Call member services at your health plan with any benefit or coverage questions.      Type of Referral:  New Cardiology Consult    Timeframe requested:  Within 1 month    Please bring the following to your appointment:  >>   Any x-rays, CTs or MRIs which have been performed.  Contact the facility where they were done to arrange for  prior to your scheduled appointment.  Any new CT, MRI or other procedures ordered by your specialist must be performed at a [facility] facility or coordinated by your clinic's referral office.   >>   List of current medications  >>   This referral request   >>   Any documents/labs given to you for this referral                  Who to contact     If you have questions or need follow up information about today's clinic visit or your schedule  "please contact [facility] directly at [phone].  Normal or non-critical lab and imaging results will be communicated to you by MyChart, letter or phone within 4 business days after the clinic has received the results. If you do not hear from us within 7 days, please contact the clinic through CardLabhart or phone. If you have a critical or abnormal lab result, we will notify you by phone as soon as possible.  Submit refill requests through WiFi Rail or call your pharmacy and they will forward the refill request to us. Please allow 3 business days for your refill to be completed.          Additional Information About Your Visit        CardLabharIQ Engines Information     WiFi Rail gives you secure access to your electronic health record. If you see a primary care provider, you can also send messages to your care team and make appointments. If you have questions, please call your primary care clinic.  If you do not have a primary care provider, please call [phone] and they will assist you.        Care EveryWhere ID     This is your Care EveryWhere ID. This could be used by other organizations to access your [place] medical records  [id number]        Your Vitals Were     Pulse Temperature Height Pulse Oximetry BMI (Body Mass Index)       90 99.2  F (37.3  C) (Tympanic) 5' 11.5\" (1.816 m) 97% 52.71 kg/m2        Blood Pressure from Last 3 Encounters:   05/22/18 140/88   03/26/18 134/80   10/21/17 134/88    Weight from Last 3 Encounters:   05/22/18 (!) 383 lb 4 oz (173.8 kg)   03/26/18 (!) 386 lb 9.6 oz (175.4 kg)   10/21/17 (!) 364 lb (165.1 kg)              We Performed the Following     Basic metabolic panel  (Ca, Cl, CO2, Creat, Gluc, K, Na, BUN)     CARDIOLOGY EVAL ADULT REFERRAL     CBC with platelets     EKG 12-lead complete w/read - Clinics     Magnesium     TSH with free T4 reflex        Primary Care Provider Office Phone # Fax #    Tian Perez [phone] [address] [place] [address]        Equal Access to Services     [facility]: Hadii aad ku hadbammita Zhangali, wasyklerda luqadaha, qaybta kaalmadulce maria scott. So [facility] [phone].    ATENCIÓN: Si habla español, tiene a mon disposición servicios gratuitos de asistencia lingüística. Llame al [phone].    We comply with applicable federal civil rights laws and [place] laws. We do not discriminate on the basis of race, color, national origin, age, disability, sex, sexual orientation, or gender identity.            Thank you!     Thank you for choosing [facility]  for your care. Our goal is always to provide you with excellent care. Hearing back from our patients is one way we can continue to improve our services. Please take a few minutes to complete the written survey that you may receive in the mail after your visit with us. Thank you!             Your Updated Medication List - Protect others around you: Learn how to safely use, store and throw away your medicines at www.disposemymeds.org.          This list is accurate as of 5/22/18  9:18 AM.  Always use your most recent med list.                   Brand Name Dispense Instructions for use Diagnosis    ALPRAZolam 0.5 MG tablet    XANAX    20 tablet    Take 1 tablet (0.5 mg) by mouth 3 times daily as needed for anxiety    Anxiety state       lisinopril 10 MG tablet    PRINIVIL/ZESTRIL    90 tablet    Take 1 tablet (10 mg) by mouth daily    Essential hypertension       metoprolol succinate 200 MG 24 hr tablet    TOPROL-XL    90 tablet    Take 1 tablet (200 mg) by mouth daily    Essential hypertension

## 2018-05-22 NOTE — TELEPHONE ENCOUNTER
From [phone] Pt called.  Feeling  Rapid Heart beat started 645am  Constantly today  . Currently :  pulse 120 with extra beats and irregularity . Hx of panic attacks in the past and has used Holter monitor in the past .   Triage for Heart rate and heart beat questions - adult with disposition of see provider in 4 hours and sent to  .   .Inna Garcia RN [place] nurse advisors.      Reason for Disposition    [1] Skipped or extra beat(s) AND [2] occurs 4 or more times per minute    Additional Information    Negative: Passed out (i.e., lost consciousness, collapsed and was not responding)    Negative: Shock suspected (e.g., cold/pale/clammy skin, too weak to stand, low BP, rapid pulse)    Negative: Difficult to awaken or acting confused  (e.g., disoriented, slurred speech)    Negative: Visible sweat on face or sweat dripping down face    Negative: Unable to walk, or can only walk with assistance (e.g., requires support)    Negative: [1] Received SHOCK from implantable cardiac defibrillator AND [2] persisting symptoms (i.e., palpitations, lightheadedness)    Negative: Sounds like a life-threatening emergency to the triager    Negative: Chest pain    Negative: Difficulty breathing    Negative: Dizziness, lightheadedness, or weakness    Negative: [1] Heart beating very rapidly (e.g., > 140 / minute) AND [2] present now  (Exception: during exercise)    Negative: Heart beating very slowly (e.g., < 50 / minute)  (Exception: athlete)    Negative: New or worsened shortness of breath with activity (dyspnea on exertion)    Negative: Patient sounds very sick or weak to the triager    Negative: [1] Heart beating very rapidly (e.g., > 140 / minute) AND [2] not present now  (Exception: during exercise)    Negative: [1] Skipped or extra beat(s) AND [2] increases with exercise or exertion    Protocols used: HEART RATE AND HEARTBEAT QUESTIONS-ADULT-

## 2018-06-13 ENCOUNTER — OFFICE VISIT (OUTPATIENT)
Dept: CARDIOLOGY | Facility: CLINIC | Age: 33
End: 2018-06-13
Payer: COMMERCIAL

## 2018-06-13 VITALS
OXYGEN SATURATION: 99 % | SYSTOLIC BLOOD PRESSURE: 128 MMHG | HEART RATE: 69 BPM | WEIGHT: 315 LBS | DIASTOLIC BLOOD PRESSURE: 88 MMHG | BODY MASS INDEX: 52.04 KG/M2

## 2018-06-13 DIAGNOSIS — R00.2 PALPITATIONS: Primary | ICD-10-CM

## 2018-06-13 PROCEDURE — 99204 OFFICE O/P NEW MOD 45 MIN: CPT | Performed by: INTERNAL MEDICINE

## 2018-06-13 NOTE — MR AVS SNAPSHOT
After Visit Summary   6/13/2018    Keo Pinto    MRN: [id number]           Patient Information     Date Of Birth          1985        Visit Information        Provider Department      6/13/2018 2:15 PM [facility] MD [facility]        Today's Diagnoses     Palpitations    -  1       Follow-ups after your visit        Who to contact     If you have questions or need follow up information about today's clinic visit or your schedule please contact [facility]   [facility] directly at [phone].  Normal or non-critical lab and imaging results will be communicated to you by Arkimediahart, letter or phone within 4 business days after the clinic has received the results. If you do not hear from us within 7 days, please contact the clinic through Spectral Imaget or phone. If you have a critical or abnormal lab result, we will notify you by phone as soon as possible.  Submit refill requests through Platial or call your pharmacy and they will forward the refill request to us. Please allow 3 business days for your refill to be completed.          Additional Information About Your Visit        MyChart Information     Platial gives you secure access to your electronic health record. If you see a primary care provider, you can also send messages to your care team and make appointments. If you have questions, please call your primary care clinic.  If you do not have a primary care provider, please call [phone] and they will assist you.        Care EveryWhere ID     This is your Care EveryWhere ID. This could be used by other organizations to access your [place] medical records  [id number]        Your Vitals Were     Pulse Pulse Oximetry BMI (Body Mass Index)             69 99% 52.04 kg/m2          Blood Pressure from Last 3 Encounters:   06/13/18 128/88   05/22/18 140/88   03/26/18 134/80    Weight from Last 3 Encounters:    06/13/18 (!) 171.6 kg (378 lb 6.4 oz)   05/22/18 (!) 173.8 kg (383 lb 4 oz)   03/26/18 (!) 175.4 kg (386 lb 9.6 oz)              Today, you had the following     No orders found for display       Primary Care Provider Office Phone # Fax #    Tian Perez [phone] [address] [place] [address]        Equal Access to Services     [facility]: Hadii aad ku hadasho Soomaali, waaxda luqadaha, qaybta kaalmada adeegyada, waxay idiin hayaan adeeg kharash laflaquito . So [facility] [phone].    ATENCIÓN: Si habla español, tiene a mon disposición servicios gratuitos de asistencia lingüística. [facility] [phone].    We comply with applicable federal civil rights laws and [place] laws. We do not discriminate on the basis of race, color, national origin, age, disability, sex, sexual orientation, or gender identity.            Thank you!     Thank you for choosing [facility]  for your care. Our goal is always to provide you with excellent care. Hearing back from our patients is one way we can continue to improve our services. Please take a few minutes to complete the written survey that you may receive in the mail after your visit with us. Thank you!             Your Updated Medication List - Protect others around you: Learn how to safely use, store and throw away your medicines at www.disposemymeds.org.          This list is accurate as of 6/13/18  2:48 PM.  Always use your most recent med list.                   Brand Name Dispense Instructions for use Diagnosis    ALPRAZolam 0.5 MG tablet    XANAX    20 tablet    Take 1 tablet (0.5 mg) by mouth 3 times daily as needed for anxiety    Anxiety state       lisinopril 10 MG tablet    PRINIVIL/ZESTRIL    90 tablet    Take 1 tablet (10 mg) by mouth daily    Essential hypertension       metoprolol succinate 200 MG 24 hr tablet    TOPROL-XL    90 tablet    Take 1 tablet (200 mg) by mouth daily     Essential hypertension

## 2018-06-13 NOTE — PROGRESS NOTES
CARDIOLOGY CONSULT    REASON FOR CONSULT: palpitations    PRIMARY CARE PHYSICIAN:  Tian Perez    HISTORY OF PRESENT ILLNESS:  31 y/o male seen for palpitations.     He has a long history of infrequent palpitations.  These are thought to be from PVCs.  Holter in 2009 showed sinus, 6 PAC's, 200 PVC's.     More recently he feels a racing heart about every 6 months.  This typically lasts 30 seconds or less.  However 2 weeks ago he had episode that lasted about 30 minutes.  He does not know what his heart rate was.  He felt quite anxious, but had no chest pain or syncope.    Otherwise he feels quite good.  He does some light to moderate exercise with no exertional symptoms.  He has no lightheadedness, dizziness, or syncope.  He denies any excessive caffeine or energy drinks or other types of stimulants.    His blood pressure tends to run 120 over 80s.  He is on a high dose of Toprol and also lisinopril.    PAST MEDICAL HISTORY:  Past Medical History:   Diagnosis Date     Esophageal reflux      Hypertension      Migraine headache        MEDICATIONS:  Current Outpatient Prescriptions   Medication     lisinopril (PRINIVIL/ZESTRIL) 10 MG tablet     metoprolol succinate (TOPROL-XL) 200 MG 24 hr tablet     ALPRAZolam (XANAX) 0.5 MG tablet     No current facility-administered medications for this visit.        ALLERGIES:  No Known Allergies    SOCIAL HISTORY:  I have reviewed this patient's social history and updated it with pertinent information if needed. Keo RUCKER Blair  reports that he has never smoked. He has never used smokeless tobacco. He reports that he drinks alcohol. He reports that he does not use illicit drugs.    FAMILY HISTORY:  I have reviewed this patient's family history and updated it with pertinent information if needed.   Family History   Problem Relation Age of Onset     Neurologic Disorder Mother      Parkinsons, Migraine HA's     Hypertension Father      Alcohol/Drug Father      HEART DISEASE  Maternal Grandfather      MI age 52     HEART DISEASE Paternal Grandfather      MI age 70's       REVIEW OF SYSTEMS:  Constitutional:  No weight loss, fever, chills, weakness or fatigue.  HEENT:  Eyes:  No visual loss, blurred vision, double vision or yellow sclerae. No hearing loss, sneezing, congestion, runny nose or sore throat.  Skin:  No rash or itching.  Cardiovascular: per HPI  Respiratory: per HPI  GI:  No anorexia, nausea, vomiting or diarrhea. No abdominal pain or blood.  :  No dysurea, hematuria  Neurologic:  No headache, dizziness, syncope, paralysis, ataxia, numbness or tingling in the extremities. No change in bowel or bladder control.  Musculoskeletal:  No muscle, back pain, joint pain or stiffness.  Hematologic:  No anemia, bleeding or bruising.  Lymphatics:  No enlarged nodes. No history of splenectomy.  Psychiatric:  No history of depression or anxiety.  Endocrine:  No reports of sweating, cold or heat intolerance. No polyuria or polydipsia.  Allergies:  No history of asthma, hives, eczema or rhinitis.    PHYSICAL EXAM:      BP: 128/88 Pulse: 69     SpO2: 99 %      Vital Signs with Ranges  Pulse:  [69] 69  BP: (128)/(88) 128/88  SpO2:  [99 %] 99 %  378 lbs 6.4 oz    Constitutional: awake, alert, no distress  Eyes: PERRL, sclera nonicteric  ENT: trachea midline  Respiratory: Lungs clear  Cardiovascular: Regular rate and rhythm, no murmurs, no ectopy  GI: nondistended, nontender, bowel sounds present  Lymph/Hematologic: no lymphadenopathy  Skin: dry, no rash  Musculoskeletal: good muscle tone, strength 5/5 in upper and lower extremities  Neurologic: no focal deficits  Neuropsychiatric: appropriate affact    DATA:  Labs:   5-22-18: TSH 3.0, Mg 2.2, K 4.4, Cr 1.0     EKG, 5-22-18: sinus, 2 PAC's     ASSESSMENT:  32-year-old male seen for palpitations.  Historically he likely has some symptomatic PVCs.  His episode 2 weeks ago could have been an SVT.  He also has some risk factors for A. fib with his  obesity and hypertension.  We talked about the importance of capturing this on a monitor.  However his symptoms are so rare, the yield would be very low for wearing a monitor over a few weeks.  If his symptoms become more frequent, a 1 or 2 weeks Zio patch would be reasonable.    We talked about a product to use with smart phone call to [employer].  This is a 2-lead telemetry unit that syncs with his smart phone and has been shown to have fairly reliable tracings.  He will look into this and would use it in the future with any symptoms.    RECOMMENDATIONS:  1.  Palpitations, suspect symptomatic PVCs, possibly an episode of SVT  - No monitor now because of very infrequent symptoms  - If symptoms increase recommend a 7 or 14 day Zio patch  - Patient will look into KardiaMobile device to use with his smart phone  - Already on high-dose metoprolol    Follow up as needed.    Sesar Ashby MD  Cardiology - [facility]  Pager:  [phone]  Text Page  June 13, 2018

## 2018-06-13 NOTE — LETTER
6/13/2018    Tian Perez MD  [address] [place] [address]    RE: Keovicente Pinto       Dear Colleague,    I had the pleasure of seeing Keo Pinto in the [facility].    CARDIOLOGY CONSULT    REASON FOR CONSULT: palpitations    PRIMARY CARE PHYSICIAN:  Tian Perez    HISTORY OF PRESENT ILLNESS:  33 y/o male seen for palpitations.     He has a long history of infrequent palpitations.  These are thought to be from PVCs.  Holter in 2009 showed sinus, 6 PAC's, 200 PVC's.     More recently he feels a racing heart about every 6 months.  This typically lasts 30 seconds or less.  However 2 weeks ago he had episode that lasted about 30 minutes.  He does not know what his heart rate was.  He felt quite anxious, but had no chest pain or syncope.    Otherwise he feels quite good.  He does some light to moderate exercise with no exertional symptoms.  He has no lightheadedness, dizziness, or syncope.  He denies any excessive caffeine or energy drinks or other types of stimulants.    His blood pressure tends to run 120 over 80s.  He is on a high dose of Toprol and also lisinopril.    PAST MEDICAL HISTORY:  Past Medical History:   Diagnosis Date     Esophageal reflux      Hypertension      Migraine headache        MEDICATIONS:  Current Outpatient Prescriptions   Medication     lisinopril (PRINIVIL/ZESTRIL) 10 MG tablet     metoprolol succinate (TOPROL-XL) 200 MG 24 hr tablet     ALPRAZolam (XANAX) 0.5 MG tablet     No current facility-administered medications for this visit.        ALLERGIES:  No Known Allergies    SOCIAL HISTORY:  I have reviewed this patient's social history and updated it with pertinent information if needed. Keo Pinto  reports that he has never smoked. He has never used smokeless tobacco. He reports that he drinks alcohol. He reports that he does not use illicit drugs.    FAMILY HISTORY:  I have reviewed this patient's family history and updated it  with pertinent information if needed.   Family History   Problem Relation Age of Onset     Neurologic Disorder Mother      Parkinsons, Migraine HA's     Hypertension Father      Alcohol/Drug Father      HEART DISEASE Maternal Grandfather      MI age 52     HEART DISEASE Paternal Grandfather      MI age 70's       REVIEW OF SYSTEMS:  Constitutional:  No weight loss, fever, chills, weakness or fatigue.  HEENT:  Eyes:  No visual loss, blurred vision, double vision or yellow sclerae. No hearing loss, sneezing, congestion, runny nose or sore throat.  Skin:  No rash or itching.  Cardiovascular: per HPI  Respiratory: per HPI  GI:  No anorexia, nausea, vomiting or diarrhea. No abdominal pain or blood.  :  No dysurea, hematuria  Neurologic:  No headache, dizziness, syncope, paralysis, ataxia, numbness or tingling in the extremities. No change in bowel or bladder control.  Musculoskeletal:  No muscle, back pain, joint pain or stiffness.  Hematologic:  No anemia, bleeding or bruising.  Lymphatics:  No enlarged nodes. No history of splenectomy.  Psychiatric:  No history of depression or anxiety.  Endocrine:  No reports of sweating, cold or heat intolerance. No polyuria or polydipsia.  Allergies:  No history of asthma, hives, eczema or rhinitis.    PHYSICAL EXAM:      BP: 128/88 Pulse: 69     SpO2: 99 %      Vital Signs with Ranges  Pulse:  [69] 69  BP: (128)/(88) 128/88  SpO2:  [99 %] 99 %  378 lbs 6.4 oz    Constitutional: awake, alert, no distress  Eyes: PERRL, sclera nonicteric  ENT: trachea midline  Respiratory: Lungs clear  Cardiovascular: Regular rate and rhythm, no murmurs, no ectopy  GI: nondistended, nontender, bowel sounds present  Lymph/Hematologic: no lymphadenopathy  Skin: dry, no rash  Musculoskeletal: good muscle tone, strength 5/5 in upper and lower extremities  Neurologic: no focal deficits  Neuropsychiatric: appropriate affact    DATA:  Labs:   5-22-18: TSH 3.0, Mg 2.2, K 4.4, Cr 1.0     EKG, 5-22-18: sinus,  2 PAC's     ASSESSMENT:  32-year-old male seen for palpitations.  Historically he likely has some symptomatic PVCs.  His episode 2 weeks ago could have been an SVT.  He also has some risk factors for A. fib with his obesity and hypertension.  We talked about the importance of capturing this on a monitor.  However his symptoms are so rare, the yield would be very low for wearing a monitor over a few weeks.  If his symptoms become more frequent, a 1 or 2 weeks Zio patch would be reasonable.    We talked about a product to use with smart phone call to [employer].  This is a 2-lead telemetry unit that syncs with his smart phone and has been shown to have fairly reliable tracings.  He will look into this and would use it in the future with any symptoms.    RECOMMENDATIONS:  1.  Palpitations, suspect symptomatic PVCs, possibly an episode of SVT  - No monitor now because of very infrequent symptoms  - If symptoms increase recommend a 7 or 14 day Zio patch  - Patient will look into [email or web address] device to use with his smart phone  - Already on high-dose metoprolol    Follow up as needed.    Sesar Ashby MD  Cardiology - [facility]  Pager:  [phone]  Text Page  June 13, 2018      Thank you for allowing me to participate in the care of your patient.    Sincerely,     Sesar Ashby MD     [facility]

## 2018-09-09 ENCOUNTER — MYC MEDICAL ADVICE (OUTPATIENT)
Dept: PEDIATRICS | Facility: CLINIC | Age: 33
End: 2018-09-09

## 2018-09-10 NOTE — TELEPHONE ENCOUNTER
See pt's  message. LOV was on 5/22/18. Advised to try OTC lotrimin. If not better, advised an OV.     LOTRIMIN AF 1 % EX CREA (Discontinued) 30 gram 2 9/29/2008 6/1/2010 --   Sig - Route: Apply to affected area BID until fully resolves - Apply externally     Boris, RN  Triage Nurse

## 2018-09-28 ENCOUNTER — ALLIED HEALTH/NURSE VISIT (OUTPATIENT)
Dept: NURSING | Facility: CLINIC | Age: 33
End: 2018-09-28
Payer: COMMERCIAL

## 2018-09-28 DIAGNOSIS — Z23 NEED FOR PROPHYLACTIC VACCINATION AND INOCULATION AGAINST INFLUENZA: Primary | ICD-10-CM

## 2018-09-28 PROCEDURE — 90686 IIV4 VACC NO PRSV 0.5 ML IM: CPT

## 2018-09-28 PROCEDURE — 90471 IMMUNIZATION ADMIN: CPT

## 2018-09-28 NOTE — MR AVS SNAPSHOT
After Visit Summary   9/28/2018    Keo Pinto    MRN: [id number]           Patient Information     Date Of Birth          1985        Visit Information        Provider Department      9/28/2018 4:30 PM [facility] NURSE [place] Torsten Torres        Today's Diagnoses     Need for prophylactic vaccination and inoculation against influenza    -  1       Follow-ups after your visit        Who to contact     If you have questions or need follow up information about today's clinic visit or your schedule please contact [facility] directly at [phone].  Normal or non-critical lab and imaging results will be communicated to you by [employer] / PedidosJ[employer]¡hart, letter or phone within 4 business days after the clinic has received the results. If you do not hear from us within 7 days, please contact the clinic through [employer] or phone. If you have a critical or abnormal lab result, we will notify you by phone as soon as possible.  Submit refill requests through [employer] or call your pharmacy and they will forward the refill request to us. Please allow 3 business days for your refill to be completed.          Additional Information About Your Visit        MyChart Information     [employer] gives you secure access to your electronic health record. If you see a primary care provider, you can also send messages to your care team and make appointments. If you have questions, please call your primary care clinic.  If you do not have a primary care provider, please call [phone] and they will assist you.        Care EveryWhere ID     This is your Care EveryWhere ID. This could be used by other organizations to access your [facility] medical records  [id number]         Blood Pressure from Last 3 Encounters:   06/13/18 128/88   05/22/18 140/88   03/26/18 134/80    Weight from Last 3 Encounters:   06/13/18 (!) 378 lb 6.4 oz (171.6 kg)   05/22/18 (!) 383 lb 4 oz (173.8 kg)   03/26/18 (!) 386 lb 9.6 oz (175.4 kg)               We Performed the Following     FLU VACCINE, SPLIT VIRUS, IM (QUADRIVALENT) [73145]- >3 YRS     Vaccine Administration, Initial [69452]        Primary Care Provider Office Phone # Fax #    Tian Perez [phone] [address] [place] [address]        Equal Access to Services     [facility]: Hadii aad ku hadasho Soomaali, waaxda luqadaha, qaybta kaalmada adeegyada, waxay evertonin hayaan adeisaias khrobbovi layulietn . So [facility] [phone].    ATENCIÓN: Si habla español, tiene a mon disposición servicios gratuitos de asistencia lingüística. [facility] [phone].    We comply with applicable federal civil rights laws and [place] laws. We do not discriminate on the basis of race, color, national origin, age, disability, sex, sexual orientation, or gender identity.            Thank you!     Thank you for choosing [facility]  for your care. Our goal is always to provide you with excellent care. Hearing back from our patients is one way we can continue to improve our services. Please take a few minutes to complete the written survey that you may receive in the mail after your visit with us. Thank you!             Your Updated Medication List - Protect others around you: Learn how to safely use, store and throw away your medicines at www.disposemymeds.org.          This list is accurate as of 9/28/18  4:49 PM.  Always use your most recent med list.                   Brand Name Dispense Instructions for use Diagnosis    ALPRAZolam 0.5 MG tablet    XANAX    20 tablet    Take 1 tablet (0.5 mg) by mouth 3 times daily as needed for anxiety    Anxiety state       lisinopril 10 MG tablet    PRINIVIL/ZESTRIL    90 tablet    Take 1 tablet (10 mg) by mouth daily    Essential hypertension       metoprolol succinate 200 MG 24 hr tablet    TOPROL-XL    90 tablet    Take 1 tablet (200 mg) by mouth daily    Essential hypertension

## 2018-09-28 NOTE — PROGRESS NOTES

## 2018-10-11 ENCOUNTER — TELEPHONE (OUTPATIENT)
Dept: PEDIATRICS | Facility: CLINIC | Age: 33
End: 2018-10-11

## 2018-10-11 NOTE — TELEPHONE ENCOUNTER
Reason for call:  Other   Patient called regarding (reason for call): call back  Additional comments: Patient calling in he has some questions about medication Lisinopril, and Metoprolol. Please callback.    Phone number to reach patient:  Home number on file [phone] (home)    Best Time:  any    Can we leave a detailed message on this number?  NO

## 2019-01-11 ENCOUNTER — OFFICE VISIT (OUTPATIENT)
Dept: URGENT CARE | Facility: URGENT CARE | Age: 34
End: 2019-01-11
Payer: COMMERCIAL

## 2019-01-11 ENCOUNTER — NURSE TRIAGE (OUTPATIENT)
Dept: NURSING | Facility: CLINIC | Age: 34
End: 2019-01-11

## 2019-01-11 VITALS
OXYGEN SATURATION: 100 % | SYSTOLIC BLOOD PRESSURE: 130 MMHG | BODY MASS INDEX: 54.57 KG/M2 | TEMPERATURE: 98 F | HEART RATE: 67 BPM | WEIGHT: 315 LBS | DIASTOLIC BLOOD PRESSURE: 84 MMHG

## 2019-01-11 DIAGNOSIS — R04.2 BLOOD-TINGED SPUTUM: ICD-10-CM

## 2019-01-11 DIAGNOSIS — R10.11 RUQ ABDOMINAL PAIN: ICD-10-CM

## 2019-01-11 DIAGNOSIS — K21.00 GASTROESOPHAGEAL REFLUX DISEASE WITH ESOPHAGITIS: Primary | ICD-10-CM

## 2019-01-11 LAB
ERYTHROCYTE [DISTWIDTH] IN BLOOD BY AUTOMATED COUNT: 12.7 % (ref 10–15)
HCT VFR BLD AUTO: 45.4 % (ref 40–53)
HGB BLD-MCNC: 15.6 G/DL (ref 13.3–17.7)
MCH RBC QN AUTO: 30.1 PG (ref 26.5–33)
MCHC RBC AUTO-ENTMCNC: 34.4 G/DL (ref 31.5–36.5)
MCV RBC AUTO: 88 FL (ref 78–100)
PLATELET # BLD AUTO: 256 10E9/L (ref 150–450)
RBC # BLD AUTO: 5.18 10E12/L (ref 4.4–5.9)
WBC # BLD AUTO: 11.3 10E9/L (ref 4–11)

## 2019-01-11 PROCEDURE — 80053 COMPREHEN METABOLIC PANEL: CPT | Performed by: STUDENT IN AN ORGANIZED HEALTH CARE EDUCATION/TRAINING PROGRAM

## 2019-01-11 PROCEDURE — 99214 OFFICE O/P EST MOD 30 MIN: CPT | Performed by: STUDENT IN AN ORGANIZED HEALTH CARE EDUCATION/TRAINING PROGRAM

## 2019-01-11 PROCEDURE — 36415 COLL VENOUS BLD VENIPUNCTURE: CPT | Performed by: STUDENT IN AN ORGANIZED HEALTH CARE EDUCATION/TRAINING PROGRAM

## 2019-01-11 PROCEDURE — 85027 COMPLETE CBC AUTOMATED: CPT | Performed by: STUDENT IN AN ORGANIZED HEALTH CARE EDUCATION/TRAINING PROGRAM

## 2019-01-11 RX ORDER — PANTOPRAZOLE SODIUM 40 MG/1
40 TABLET, DELAYED RELEASE ORAL DAILY
Qty: 30 TABLET | Refills: 0 | Status: ON HOLD | OUTPATIENT
Start: 2019-01-11 | End: 2019-03-02

## 2019-01-11 NOTE — PROGRESS NOTES
SUBJECTIVE:   Keo Pinto is a 33 year old male who presents to clinic today for the following health issues:    GERD/Heartburn      Duration: 1 day    Description (location/character/radiation): right upper quadrant discomfort    Intensity:  mild    Accompanying signs and symptoms  nausea/vomiting/blood: YES  abdominal pain: YES  black/tarry or bloody stools: no :    History (similar episodes/previous evaluation): None    Precipitating or alleviating factors:  worse with spicy foods.  current NSAID/Aspirin use: no     Therapies tried and outcome: none    He describes the pain as pressure in nature, 3-5/10, non-radiating.   Laying flat worsens his symptoms acutely.   He had 4-5 emesis today without bile or blood; he has been able to keep fluids down still.  He had intermittent dry heaving today with a tinge of blood streaked sputum x1.  No additional blood tinged sputum at this time.  No dizziness, lightheadness, chest pain, shortness of breath or change in vision.  He has been previously on protonix with good effect.  He has a hiatal hernia however no imaging obtained per patient to confirm.  No prior endoscopy per the patient.      Problem list and histories reviewed & adjusted, as indicated.  Additional history: as documented    Patient Active Problem List   Diagnosis     Anxiety state     Esophageal reflux     Insomnia     Hypertension     CARDIOVASCULAR SCREENING; LDL GOAL LESS THAN 160     Morbid obesity (H)     Past Surgical History:   Procedure Laterality Date     ADENOIDECTOMY       PE TUBES      As a young child       Social History     Tobacco Use     Smoking status: Never Smoker     Smokeless tobacco: Never Used   Substance Use Topics     Alcohol use: Yes     Comment: Rare     Family History   Problem Relation Age of Onset     Neurologic Disorder Mother         Parkinsons, Migraine HA's     Hypertension Father      Alcohol/Drug Father      Heart Disease Maternal Grandfather         MI age 52      Heart Disease Paternal Grandfather         MI age 70's         Current Outpatient Medications   Medication Sig Dispense Refill     lisinopril (PRINIVIL/ZESTRIL) 10 MG tablet Take 1 tablet (10 mg) by mouth daily 90 tablet 3     metoprolol succinate (TOPROL-XL) 200 MG 24 hr tablet Take 1 tablet (200 mg) by mouth daily 90 tablet 3     ALPRAZolam (XANAX) 0.5 MG tablet Take 1 tablet (0.5 mg) by mouth 3 times daily as needed for anxiety (Patient not taking: Reported on 3/26/2018) 20 tablet 0     No Known Allergies  BP Readings from Last 3 Encounters:   01/11/19 130/84   06/13/18 128/88   05/22/18 140/88    Wt Readings from Last 3 Encounters:   01/11/19 (!) 180 kg (396 lb 12.8 oz)   06/13/18 (!) 171.6 kg (378 lb 6.4 oz)   05/22/18 (!) 173.8 kg (383 lb 4 oz)                    Reviewed and updated as needed this visit by clinical staff  Allergies  Meds       Reviewed and updated as needed this visit by Provider         ROS:  Constitutional, HEENT, cardiovascular, pulmonary, gi and gu systems are negative, except as otherwise noted.    OBJECTIVE:     /84   Pulse 67   Temp 98  F (36.7  C) (Oral)   Wt (!) 180 kg (396 lb 12.8 oz)   SpO2 100%   BMI 54.57 kg/m    Body mass index is 54.57 kg/m .  GENERAL: healthy, alert and no distress.  Making needs known.    EYES: Eyes grossly normal to inspection, PERRL and conjunctivae and sclerae normal  NECK: no adenopathy, no asymmetry, masses, or scars  RESP: lungs clear to auscultation - no rales, rhonchi or wheezes.  Normal rate and effort.  CV: regular rate and rhythm, normal S1 S2, no murmur, no peripheral edema and peripheral pulses strong  ABDOMEN: soft, mild tenderness to palpation in RUQ, no appreciable hepatosplenomegaly, no masses and bowel sounds normal.  Negative Walter's sign.  MS: no gross musculoskeletal defects noted, no edema  SKIN: no suspicious lesions or rashes    Diagnostic Test Results:  Results for orders placed or performed in visit on 01/11/19   CBC  with platelets   Result Value Ref Range    WBC 11.3 (H) 4.0 - 11.0 10e9/L    RBC Count 5.18 4.4 - 5.9 10e12/L    Hemoglobin 15.6 13.3 - 17.7 g/dL    Hematocrit 45.4 40.0 - 53.0 %    MCV 88 78 - 100 fl    MCH 30.1 26.5 - 33.0 pg    MCHC 34.4 31.5 - 36.5 g/dL    RDW 12.7 10.0 - 15.0 %    Platelet Count 256 150 - 450 10e9/L     CMP - pending    ASSESSMENT/PLAN:   1. Gastroesophageal reflux disease with esophagitis  S/s consistent with GERD with esophagitis.  Similar to symptoms previously experienced.  Unclear if patient truly has hiatal hernia; if present may warrant GI referral for optimization of medical management vs general surgery consult for potential laparoscopic repair.  - discussed lifestyle modifications to reduce acid production  - will bridge with H2 blocker for 3-5 days, pantoprazole (PROTONIX) 40 MG EC tablet; Take 1 tablet (40 mg) by mouth daily  Dispense: 30 tablet; Refill: 0  - Comprehensive metabolic panel (BMP + Alb, Alk Phos, ALT, AST, Total. Bili, TP)  - consider GI referral pending re-evaluation for possible imaging and scope    2. RUQ abdominal pain  Small concern for GI pathology given location of pain endorsed on presentation.  HDS on presentation.  No recent illness to note suggestive of acute hepatitis.  Risk factors for gallbladder pathology however negative Walter's sign and no evidence of jaundice.   - Comprehensive metabolic panel (BMP + Alb, Alk Phos, ALT, AST, Total. Bili, TP)    3. Blood-tinged sputum  - CBC with platelets    Medications discussed.  See Patient Instructions    Slade Ray MD  [facility]

## 2019-01-11 NOTE — TELEPHONE ENCOUNTER
"Keo calling with concerns that he history of acid reflux and hiatal hernia, He has been having an episode of acid reflux today that is unrelieved by medication and is going on for over 2 hours. He is vomiting, has had the dry heaves and has thrown up bile and streaky blood tinged sputum. He is sweating and rates his pain 3 to 4 of 10. States that his breathing and heart rate feel ok and that the pain is not radiating.     Disposition: See a physician in 4 hours. Keo verbalized understanding and states the he will follow disposition and go in to the [facility].   RN advised to call back with any changes, worsening of symptoms, and questions or concerns.   Jerrica Hull RN/FNA      Reason for Disposition    [1] MILD-MODERATE pain AND [2] constant AND [3] present > 2 hours    Additional Information    Negative: Severe difficulty breathing (e.g., struggling for each breath, speaks in single words)    Negative: Shock suspected (e.g., cold/pale/clammy skin, too weak to stand, low BP, rapid pulse)    Negative: Difficult to awaken or acting confused  (e.g., disoriented, slurred speech)    Negative: Passed out (i.e., lost consciousness, collapsed and was not responding)    Negative: Visible sweat on face or sweat dripping down face    Negative: Sounds like a life-threatening emergency to the triager    Negative: Followed an abdomen (stomach) injury    Negative: Chest pain    Negative: [1] Vomiting AND [2] contains black (\"coffee ground\") material    Negative: [1] SEVERE pain (e.g., excruciating) AND [2] present > 1 hour    Negative: [1] Pain lasts > 10 minutes AND [2] age > 50    Negative: [1] Pain lasts > 10 minutes AND [2] age > 40 AND [3] associated chest, arm, neck, upper back or jaw pain    Negative: [1] Pain lasts > 10 minutes AND [2] age > 35 AND [3] at least one cardiac risk factor (i.e., hypertension, diabetes, obesity, smoker or strong family history of heart disease)    Negative: [1] Pain lasts > 10 " minutes AND [2] history of heart disease (i.e., heart attack, bypass surgery, angina, angioplasty, CHF; not just a heart murmur)    Negative: [1] Pain lasts > 10 minutes AND [2] difficulty breathing    Negative: [1] Vomiting AND [2] contains red blood  (Exception: few streaks and only occurred once)    Negative: Blood in bowel movements  (Exception: Blood on surface of BM with constipation)    Negative: Black or tarry bowel movements  (Exception: chronic-unchanged  black-grey bowel movements AND is taking iron pills or Pepto-bismol)    Negative: [1] Pregnant > 24 weeks AND [2] hand or face swelling    Negative: Patient sounds very sick or weak to the triager    Protocols used: ABDOMINAL PAIN - UPPER-ADULT-AH

## 2019-01-12 NOTE — PATIENT INSTRUCTIONS
As we discussed, you are having worsening reflux symptoms acutely.  Please start the medication to be taken 30 minutes before eating or drinking anything in the morning.  Please avoid food or water for the last 1 hour before bed.  After eating and drinking throughout the day, sit upright for at least 30-45 minutes afterwards.  I would recommend smaller more frequent as well.  Avoid caffeine, chocolate, alcohol, spicy foods, or acidic foods.  See Dr. Perez in the next 1-2 weeks for reassessment and potential referral to GI pending symptoms.    Slade Ray MD

## 2019-01-13 LAB
ALBUMIN SERPL-MCNC: 4 G/DL (ref 3.4–5)
ALP SERPL-CCNC: 71 U/L (ref 40–150)
ALT SERPL W P-5'-P-CCNC: 57 U/L (ref 0–70)
ANION GAP SERPL CALCULATED.3IONS-SCNC: 8 MMOL/L (ref 3–14)
AST SERPL W P-5'-P-CCNC: 25 U/L (ref 0–45)
BILIRUB SERPL-MCNC: 0.5 MG/DL (ref 0.2–1.3)
BUN SERPL-MCNC: 11 MG/DL (ref 7–30)
CALCIUM SERPL-MCNC: 9.7 MG/DL (ref 8.5–10.1)
CHLORIDE SERPL-SCNC: 103 MMOL/L (ref 94–109)
CO2 SERPL-SCNC: 26 MMOL/L (ref 20–32)
CREAT SERPL-MCNC: 1.02 MG/DL (ref 0.66–1.25)
GFR SERPL CREATININE-BSD FRML MDRD: >90 ML/MIN/{1.73_M2}
GLUCOSE SERPL-MCNC: 94 MG/DL (ref 70–99)
POTASSIUM SERPL-SCNC: 4.5 MMOL/L (ref 3.4–5.3)
PROT SERPL-MCNC: 7.4 G/DL (ref 6.8–8.8)
SODIUM SERPL-SCNC: 137 MMOL/L (ref 133–144)

## 2019-01-22 ENCOUNTER — OFFICE VISIT (OUTPATIENT)
Dept: URGENT CARE | Facility: URGENT CARE | Age: 34
End: 2019-01-22
Payer: COMMERCIAL

## 2019-01-22 VITALS
TEMPERATURE: 98.4 F | WEIGHT: 315 LBS | OXYGEN SATURATION: 97 % | RESPIRATION RATE: 16 BRPM | SYSTOLIC BLOOD PRESSURE: 138 MMHG | HEART RATE: 90 BPM | BODY MASS INDEX: 54.32 KG/M2 | DIASTOLIC BLOOD PRESSURE: 90 MMHG

## 2019-01-22 DIAGNOSIS — K21.9 GASTROESOPHAGEAL REFLUX DISEASE, ESOPHAGITIS PRESENCE NOT SPECIFIED: ICD-10-CM

## 2019-01-22 DIAGNOSIS — R10.13 EPIGASTRIC PAIN: Primary | ICD-10-CM

## 2019-01-22 PROCEDURE — 87338 HPYLORI STOOL AG IA: CPT | Performed by: FAMILY MEDICINE

## 2019-01-22 PROCEDURE — 99214 OFFICE O/P EST MOD 30 MIN: CPT | Performed by: FAMILY MEDICINE

## 2019-01-22 NOTE — PROGRESS NOTES
Keo Pinto is a 33 year old male who presents to  today regarding ongoing acid reflux.  He was seen in  for this about 10 days ago and started on PPI with H2 blocker bridging.  Last night the patient had acute worsening of reflux symptoms and has had several episodes of vomiting since last night.  He is not having any diarrhea.  He has not had any exposure that he knows of to anyone with a vomiting/diarrheal illness.  No known fever.  Has epigastric pain intermittently.  No hematemesis.    Chart reviewed.  Labs drawn at last visit were WNL.    Pt doesn't think he's ever had H pylori testing done.  He has made some dietary modifications in the past, namely limiting acidic food intake.  He is not currently following a special diet.    He wasn't able to take his HTN medication this morning due to the vomiting.    Past Medical History:   Diagnosis Date     Esophageal reflux      Hypertension      Migraine headache      Has had GI symptoms since childhood.    /90 (Cuff Size: Adult Large)   Pulse 90   Temp 98.4  F (36.9  C) (Oral)   Resp 16   Wt (!) 179.2 kg (395 lb)   SpO2 97%   BMI 54.32 kg/m    GEN: well-appearing, in NAD  ENT: oral MMM, normal pharynx  Neck:  Supple, no LAD  Lungs:  CTAB, good air entry bilaterally  CV:  RRR, no murmurs  Abd: mild epigastric tenderness, normal bowel sounds, no masses noted    ASSESSMENT:  Epigastric pain with history of GERD  Currently not responding well to PPI.    PLAN:  Patient Instructions   H pylori testing today.  Return your sample according to the lab instructions.    Start diligently tracking food intake to see if there are any patterns of things that help or worsen symptoms.    Continue medications as prescribed by Dr. Ray.    Follow up with Dr. Perez in 1-2 weeks to review H pylori results and start treatment if positive.    Discussed with pt that GI referral would need to come from his primary care provider as we in urgent care wouldn't be able to  follow up on their recommendations.  Encouraged food diary to start now so that data is available for review should a GI consult be warranted.

## 2019-01-22 NOTE — PATIENT INSTRUCTIONS
H pylori testing today.  Return your sample according to the lab instructions.    Start diligently tracking food intake to see if there are any patterns of things that help or worsen symptoms.    Continue medications as prescribed by Dr. Ray.    Follow up with Dr. Perez in 1-2 weeks to review H pylori results and start treatment if positive.

## 2019-01-23 DIAGNOSIS — R10.13 EPIGASTRIC PAIN: ICD-10-CM

## 2019-01-24 LAB
H PYLORI AG STL QL IA: NORMAL
SPECIMEN SOURCE: NORMAL

## 2019-01-25 NOTE — PROGRESS NOTES
SUBJECTIVE:   Keo Pinto is a 33 year old male who presents to clinic today for the following health issues:    ED/UC Followup:    Facility:  [facility]  Date of visit: 1/11/2019 and 1/22/2019  Reason for visit: Gastro issues  Current Status: Has been just having 1 meal a day. Sx are better but not gone.      Reviewed UC notes. At first visit, started pantoprazole. 40 mg once per day.  Sx did not fully improve. Evaluated again 1/22.     H pylori testing done, negative.     Sx are somewhat better today.   Has been sticking to very bland foods and soup.   No blood with stools, no black tarry stools    Problem list and histories reviewed & adjusted, as indicated.    Labs reviewed in EPIC    Reviewed and updated as needed this visit  Tobacco  Allergies  Meds  Med Hx  Surg Hx  Fam Hx  Soc Hx        ROS:  Constitutional, HEENT, cardiovascular, pulmonary, gi and gu systems are negative, except as otherwise noted.    OBJECTIVE:     /84   Pulse 63   Temp 97.7  F (36.5  C) (Oral)   Wt (!) 176 kg (388 lb)   SpO2 98%   BMI 53.36 kg/m    Body mass index is 53.36 kg/m .  GEN: no distress  SKIN: no rashes  NECK: Supple. No LAD or TM.  LUNGS: Clear to auscultation bilaterally. No rhonchi, rales, wheezes or retractions.  CV: Regular rate and rhythm.  No murmurs, rubs or gallops. Pulses 2+ radial.  ABD: Bowel sounds positive throughout. Soft, nontender, nondistended. No organomegaly. No masses.  EXTR: no edema      ASSESSMENT/PLAN:       ICD-10-CM    1. Gastroesophageal reflux disease without esophagitis K21.9 sucralfate (CARAFATE) 1 GM tablet     GASTROENTEROLOGY ADULT REF CONSULT ONLY   2. Other acute gastritis without hemorrhage K29.00 sucralfate (CARAFATE) 1 GM tablet     GASTROENTEROLOGY ADULT REF CONSULT ONLY   3. Essential hypertension I10 metoprolol succinate ER (TOPROL-XL) 200 MG 24 hr tablet     lisinopril (PRINIVIL/ZESTRIL) 10 MG tablet     Patient Instructions   Add Carafate (sucralfate) 1 gram  tablets   Take with meals and at bedtime    Take pantoprazole and your blood pressure pills 30 minutes prior to Carafate    Call to set up a gastroenterology visit   [facility] [facility] - [phone]    Tian Perez MD  [facility]

## 2019-01-28 ENCOUNTER — OFFICE VISIT (OUTPATIENT)
Dept: PEDIATRICS | Facility: CLINIC | Age: 34
End: 2019-01-28
Payer: COMMERCIAL

## 2019-01-28 VITALS
HEART RATE: 63 BPM | DIASTOLIC BLOOD PRESSURE: 84 MMHG | TEMPERATURE: 97.7 F | WEIGHT: 315 LBS | BODY MASS INDEX: 53.36 KG/M2 | SYSTOLIC BLOOD PRESSURE: 132 MMHG | OXYGEN SATURATION: 98 %

## 2019-01-28 DIAGNOSIS — K29.00 OTHER ACUTE GASTRITIS WITHOUT HEMORRHAGE: ICD-10-CM

## 2019-01-28 DIAGNOSIS — I10 ESSENTIAL HYPERTENSION: ICD-10-CM

## 2019-01-28 DIAGNOSIS — K21.9 GASTROESOPHAGEAL REFLUX DISEASE WITHOUT ESOPHAGITIS: Primary | ICD-10-CM

## 2019-01-28 PROCEDURE — 99213 OFFICE O/P EST LOW 20 MIN: CPT | Performed by: INTERNAL MEDICINE

## 2019-01-28 RX ORDER — SUCRALFATE 1 G/1
1 TABLET ORAL 4 TIMES DAILY
Qty: 40 TABLET | Refills: 2 | Status: ON HOLD | OUTPATIENT
Start: 2019-01-28 | End: 2019-03-02

## 2019-01-28 RX ORDER — METOPROLOL SUCCINATE 200 MG/1
200 TABLET, EXTENDED RELEASE ORAL DAILY
Qty: 90 TABLET | Refills: 3 | Status: SHIPPED | OUTPATIENT
Start: 2019-01-28 | End: 2020-02-27

## 2019-01-28 RX ORDER — LISINOPRIL 10 MG/1
10 TABLET ORAL DAILY
Qty: 90 TABLET | Refills: 3 | Status: SHIPPED | OUTPATIENT
Start: 2019-01-28 | End: 2020-02-27

## 2019-01-28 NOTE — PATIENT INSTRUCTIONS
Add Carafate (sucralfate) 1 gram tablets   Take with meals and at bedtime    Take pantoprazole and your blood pressure pills 30 minutes prior to Carafate    Call to set up a gastroenterology visit   [facility] [facility] - [phone]

## 2019-01-30 ENCOUNTER — TRANSFERRED RECORDS (OUTPATIENT)
Dept: HEALTH INFORMATION MANAGEMENT | Facility: CLINIC | Age: 34
End: 2019-01-30

## 2019-02-12 ENCOUNTER — HOSPITAL ENCOUNTER (OUTPATIENT)
Facility: CLINIC | Age: 34
End: 2019-02-12
Attending: INTERNAL MEDICINE | Admitting: INTERNAL MEDICINE
Payer: COMMERCIAL

## 2019-02-27 ENCOUNTER — TELEPHONE (OUTPATIENT)
Dept: INTENSIVE CARE | Facility: CLINIC | Age: 34
End: 2019-02-27

## 2019-02-27 ENCOUNTER — TRANSFERRED RECORDS (OUTPATIENT)
Dept: HEALTH INFORMATION MANAGEMENT | Facility: CLINIC | Age: 34
End: 2019-02-27

## 2019-02-28 ENCOUNTER — APPOINTMENT (OUTPATIENT)
Dept: INTERVENTIONAL RADIOLOGY/VASCULAR | Facility: CLINIC | Age: 34
End: 2019-02-28
Attending: INTERNAL MEDICINE
Payer: COMMERCIAL

## 2019-02-28 ENCOUNTER — ANESTHESIA (OUTPATIENT)
Dept: SURGERY | Facility: CLINIC | Age: 34
End: 2019-02-28
Payer: COMMERCIAL

## 2019-02-28 ENCOUNTER — HOSPITAL ENCOUNTER (INPATIENT)
Facility: CLINIC | Age: 34
LOS: 2 days | Discharge: HOME | End: 2019-03-02
Attending: EMERGENCY MEDICINE | Admitting: INTERNAL MEDICINE
Payer: COMMERCIAL

## 2019-02-28 ENCOUNTER — ANESTHESIA EVENT (OUTPATIENT)
Dept: SURGERY | Facility: CLINIC | Age: 34
End: 2019-02-28
Payer: COMMERCIAL

## 2019-02-28 ENCOUNTER — APPOINTMENT (OUTPATIENT)
Dept: ULTRASOUND IMAGING | Facility: CLINIC | Age: 34
End: 2019-02-28
Attending: EMERGENCY MEDICINE
Payer: COMMERCIAL

## 2019-02-28 DIAGNOSIS — K85.10 GALLSTONE PANCREATITIS: ICD-10-CM

## 2019-02-28 DIAGNOSIS — K81.0 ACUTE CHOLECYSTITIS: ICD-10-CM

## 2019-02-28 DIAGNOSIS — K21.00 GASTROESOPHAGEAL REFLUX DISEASE WITH ESOPHAGITIS: ICD-10-CM

## 2019-02-28 LAB
ALBUMIN SERPL-MCNC: 3.2 G/DL (ref 3.4–5)
ALBUMIN SERPL-MCNC: 3.6 G/DL (ref 3.4–5)
ALP SERPL-CCNC: 152 U/L (ref 40–150)
ALP SERPL-CCNC: 173 U/L (ref 40–150)
ALT SERPL W P-5'-P-CCNC: 469 U/L (ref 0–70)
ALT SERPL W P-5'-P-CCNC: 519 U/L (ref 0–70)
ANION GAP SERPL CALCULATED.3IONS-SCNC: 7 MMOL/L (ref 3–14)
ANION GAP SERPL CALCULATED.3IONS-SCNC: 8 MMOL/L (ref 3–14)
AST SERPL W P-5'-P-CCNC: 243 U/L (ref 0–45)
AST SERPL W P-5'-P-CCNC: 274 U/L (ref 0–45)
BASOPHILS # BLD AUTO: 0 10E9/L (ref 0–0.2)
BASOPHILS NFR BLD AUTO: 0.2 %
BILIRUB SERPL-MCNC: 2.5 MG/DL (ref 0.2–1.3)
BILIRUB SERPL-MCNC: 3.1 MG/DL (ref 0.2–1.3)
BUN SERPL-MCNC: 6 MG/DL (ref 7–30)
BUN SERPL-MCNC: 6 MG/DL (ref 7–30)
CALCIUM SERPL-MCNC: 8.3 MG/DL (ref 8.5–10.1)
CALCIUM SERPL-MCNC: 9 MG/DL (ref 8.5–10.1)
CHLORIDE SERPL-SCNC: 104 MMOL/L (ref 94–109)
CHLORIDE SERPL-SCNC: 107 MMOL/L (ref 94–109)
CO2 SERPL-SCNC: 24 MMOL/L (ref 20–32)
CO2 SERPL-SCNC: 26 MMOL/L (ref 20–32)
CREAT SERPL-MCNC: 0.84 MG/DL (ref 0.66–1.25)
CREAT SERPL-MCNC: 0.96 MG/DL (ref 0.66–1.25)
DIFFERENTIAL METHOD BLD: ABNORMAL
EOSINOPHIL # BLD AUTO: 0 10E9/L (ref 0–0.7)
EOSINOPHIL NFR BLD AUTO: 0.3 %
ERCP: NORMAL
ERYTHROCYTE [DISTWIDTH] IN BLOOD BY AUTOMATED COUNT: 12.6 % (ref 10–15)
GFR SERPL CREATININE-BSD FRML MDRD: >90 ML/MIN/{1.73_M2}
GFR SERPL CREATININE-BSD FRML MDRD: >90 ML/MIN/{1.73_M2}
GLUCOSE SERPL-MCNC: 109 MG/DL (ref 70–99)
GLUCOSE SERPL-MCNC: 114 MG/DL (ref 70–99)
HCT VFR BLD AUTO: 46.3 % (ref 40–53)
HGB BLD-MCNC: 15.9 G/DL (ref 13.3–17.7)
IMM GRANULOCYTES # BLD: 0 10E9/L (ref 0–0.4)
IMM GRANULOCYTES NFR BLD: 0.3 %
INTERPRETATION ECG - MUSE: NORMAL
LIPASE SERPL-CCNC: ABNORMAL U/L (ref 73–393)
LYMPHOCYTES # BLD AUTO: 1.3 10E9/L (ref 0.8–5.3)
LYMPHOCYTES NFR BLD AUTO: 10.9 %
MCH RBC QN AUTO: 30.3 PG (ref 26.5–33)
MCHC RBC AUTO-ENTMCNC: 34.3 G/DL (ref 31.5–36.5)
MCV RBC AUTO: 88 FL (ref 78–100)
MONOCYTES # BLD AUTO: 0.7 10E9/L (ref 0–1.3)
MONOCYTES NFR BLD AUTO: 6.1 %
NEUTROPHILS # BLD AUTO: 9.6 10E9/L (ref 1.6–8.3)
NEUTROPHILS NFR BLD AUTO: 82.2 %
NRBC # BLD AUTO: 0 10*3/UL
NRBC BLD AUTO-RTO: 0 /100
PLATELET # BLD AUTO: 238 10E9/L (ref 150–450)
PLATELET # BLD AUTO: 263 10E9/L (ref 150–450)
POTASSIUM SERPL-SCNC: 3.6 MMOL/L (ref 3.4–5.3)
POTASSIUM SERPL-SCNC: 3.9 MMOL/L (ref 3.4–5.3)
PROT SERPL-MCNC: 6.9 G/DL (ref 6.8–8.8)
PROT SERPL-MCNC: 7.6 G/DL (ref 6.8–8.8)
RBC # BLD AUTO: 5.24 10E12/L (ref 4.4–5.9)
SODIUM SERPL-SCNC: 137 MMOL/L (ref 133–144)
SODIUM SERPL-SCNC: 139 MMOL/L (ref 133–144)
UPPER EUS: NORMAL
WBC # BLD AUTO: 11.7 10E9/L (ref 4–11)

## 2019-02-28 PROCEDURE — 40000067 ZZH STATISTIC ERCP (OR PROCEDURE): Performed by: INTERNAL MEDICINE

## 2019-02-28 PROCEDURE — 25800030 ZZH RX IP 258 OP 636

## 2019-02-28 PROCEDURE — 0DJ08ZZ INSPECTION OF UPPER INTESTINAL TRACT, VIA NATURAL OR ARTIFICIAL OPENING ENDOSCOPIC: ICD-10-PCS | Performed by: INTERNAL MEDICINE

## 2019-02-28 PROCEDURE — 25000128 H RX IP 250 OP 636: Performed by: ANESTHESIOLOGY

## 2019-02-28 PROCEDURE — 37000008 ZZH ANESTHESIA TECHNICAL FEE, 1ST 30 MIN: Performed by: INTERNAL MEDICINE

## 2019-02-28 PROCEDURE — 36415 COLL VENOUS BLD VENIPUNCTURE: CPT | Performed by: INTERNAL MEDICINE

## 2019-02-28 PROCEDURE — 25000125 ZZHC RX 250

## 2019-02-28 PROCEDURE — 25800030 ZZH RX IP 258 OP 636: Performed by: INTERNAL MEDICINE

## 2019-02-28 PROCEDURE — 25000125 ZZHC RX 250: Performed by: INTERNAL MEDICINE

## 2019-02-28 PROCEDURE — C9113 INJ PANTOPRAZOLE SODIUM, VIA: HCPCS | Performed by: INTERNAL MEDICINE

## 2019-02-28 PROCEDURE — 85025 COMPLETE CBC W/AUTO DIFF WBC: CPT | Performed by: EMERGENCY MEDICINE

## 2019-02-28 PROCEDURE — 40000306 ZZH STATISTIC PRE PROC ASSESS II: Performed by: INTERNAL MEDICINE

## 2019-02-28 PROCEDURE — 25000128 H RX IP 250 OP 636: Performed by: EMERGENCY MEDICINE

## 2019-02-28 PROCEDURE — 0FC98ZZ EXTIRPATION OF MATTER FROM COMMON BILE DUCT, VIA NATURAL OR ARTIFICIAL OPENING ENDOSCOPIC: ICD-10-PCS | Performed by: INTERNAL MEDICINE

## 2019-02-28 PROCEDURE — 96361 HYDRATE IV INFUSION ADD-ON: CPT

## 2019-02-28 PROCEDURE — 12000000 ZZH R&B MED SURG/OB

## 2019-02-28 PROCEDURE — 93010 ELECTROCARDIOGRAM REPORT: CPT | Performed by: INTERNAL MEDICINE

## 2019-02-28 PROCEDURE — 27210794 ZZH OR GENERAL SUPPLY STERILE: Performed by: INTERNAL MEDICINE

## 2019-02-28 PROCEDURE — 80053 COMPREHEN METABOLIC PANEL: CPT | Performed by: INTERNAL MEDICINE

## 2019-02-28 PROCEDURE — 25000132 ZZH RX MED GY IP 250 OP 250 PS 637: Performed by: INTERNAL MEDICINE

## 2019-02-28 PROCEDURE — 80053 COMPREHEN METABOLIC PANEL: CPT | Performed by: EMERGENCY MEDICINE

## 2019-02-28 PROCEDURE — 99223 1ST HOSP IP/OBS HIGH 75: CPT | Mod: AI | Performed by: INTERNAL MEDICINE

## 2019-02-28 PROCEDURE — 96375 TX/PRO/DX INJ NEW DRUG ADDON: CPT

## 2019-02-28 PROCEDURE — 37000009 ZZH ANESTHESIA TECHNICAL FEE, EACH ADDTL 15 MIN: Performed by: INTERNAL MEDICINE

## 2019-02-28 PROCEDURE — 76705 ECHO EXAM OF ABDOMEN: CPT

## 2019-02-28 PROCEDURE — 36000058 ZZH SURGERY LEVEL 3 EA 15 ADDTL MIN: Performed by: INTERNAL MEDICINE

## 2019-02-28 PROCEDURE — 96365 THER/PROPH/DIAG IV INF INIT: CPT

## 2019-02-28 PROCEDURE — 25800030 ZZH RX IP 258 OP 636: Performed by: ANESTHESIOLOGY

## 2019-02-28 PROCEDURE — 25000125 ZZHC RX 250: Performed by: EMERGENCY MEDICINE

## 2019-02-28 PROCEDURE — 25000128 H RX IP 250 OP 636: Performed by: INTERNAL MEDICINE

## 2019-02-28 PROCEDURE — 36000060 ZZH SURGERY LEVEL 3 W FLUORO 1ST 30 MIN: Performed by: INTERNAL MEDICINE

## 2019-02-28 PROCEDURE — C1726 CATH, BAL DIL, NON-VASCULAR: HCPCS | Performed by: INTERNAL MEDICINE

## 2019-02-28 PROCEDURE — 25000128 H RX IP 250 OP 636

## 2019-02-28 PROCEDURE — 96376 TX/PRO/DX INJ SAME DRUG ADON: CPT

## 2019-02-28 PROCEDURE — 83690 ASSAY OF LIPASE: CPT | Performed by: EMERGENCY MEDICINE

## 2019-02-28 PROCEDURE — 71000012 ZZH RECOVERY PHASE 1 LEVEL 1 FIRST HR: Performed by: INTERNAL MEDICINE

## 2019-02-28 PROCEDURE — 40000799 ZZH STATISTIC EUS (OR PROCEDURE): Performed by: INTERNAL MEDICINE

## 2019-02-28 PROCEDURE — 99285 EMERGENCY DEPT VISIT HI MDM: CPT | Mod: 25

## 2019-02-28 PROCEDURE — C1769 GUIDE WIRE: HCPCS | Performed by: INTERNAL MEDICINE

## 2019-02-28 PROCEDURE — 85049 AUTOMATED PLATELET COUNT: CPT | Performed by: INTERNAL MEDICINE

## 2019-02-28 RX ORDER — FLUMAZENIL 0.1 MG/ML
0.2 INJECTION, SOLUTION INTRAVENOUS
Status: ACTIVE | OUTPATIENT
Start: 2019-02-28 | End: 2019-03-01

## 2019-02-28 RX ORDER — SODIUM CHLORIDE, SODIUM LACTATE, POTASSIUM CHLORIDE, CALCIUM CHLORIDE 600; 310; 30; 20 MG/100ML; MG/100ML; MG/100ML; MG/100ML
INJECTION, SOLUTION INTRAVENOUS CONTINUOUS
Status: DISCONTINUED | OUTPATIENT
Start: 2019-02-28 | End: 2019-02-28 | Stop reason: HOSPADM

## 2019-02-28 RX ORDER — NALOXONE HYDROCHLORIDE 0.4 MG/ML
.1-.4 INJECTION, SOLUTION INTRAMUSCULAR; INTRAVENOUS; SUBCUTANEOUS
Status: ACTIVE | OUTPATIENT
Start: 2019-02-28 | End: 2019-03-01

## 2019-02-28 RX ORDER — ONDANSETRON 2 MG/ML
4 INJECTION INTRAMUSCULAR; INTRAVENOUS EVERY 6 HOURS PRN
Status: DISCONTINUED | OUTPATIENT
Start: 2019-02-28 | End: 2019-02-28

## 2019-02-28 RX ORDER — LABETALOL 20 MG/4 ML (5 MG/ML) INTRAVENOUS SYRINGE
10
Status: DISCONTINUED | OUTPATIENT
Start: 2019-02-28 | End: 2019-02-28 | Stop reason: HOSPADM

## 2019-02-28 RX ORDER — LIDOCAINE AND MENTHOL 40; 40 MG/1; MG/1
PATCH TOPICAL DAILY PRN
COMMUNITY
End: 2020-09-14

## 2019-02-28 RX ORDER — FENTANYL CITRATE 50 UG/ML
INJECTION, SOLUTION INTRAMUSCULAR; INTRAVENOUS PRN
Status: DISCONTINUED | OUTPATIENT
Start: 2019-02-28 | End: 2019-02-28

## 2019-02-28 RX ORDER — INDOMETHACIN 50 MG/1
100 SUPPOSITORY RECTAL
Status: COMPLETED | OUTPATIENT
Start: 2019-02-28 | End: 2019-02-28

## 2019-02-28 RX ORDER — FENTANYL CITRATE 50 UG/ML
100 INJECTION, SOLUTION INTRAMUSCULAR; INTRAVENOUS ONCE
Status: COMPLETED | OUTPATIENT
Start: 2019-02-28 | End: 2019-02-28

## 2019-02-28 RX ORDER — LIDOCAINE 40 MG/G
CREAM TOPICAL
Status: DISCONTINUED | OUTPATIENT
Start: 2019-02-28 | End: 2019-02-28 | Stop reason: HOSPADM

## 2019-02-28 RX ORDER — ACETAMINOPHEN 500 MG
500-1000 TABLET ORAL EVERY 6 HOURS PRN
COMMUNITY
End: 2020-06-05

## 2019-02-28 RX ORDER — METOCLOPRAMIDE 10 MG/1
10 TABLET ORAL EVERY 6 HOURS PRN
Status: DISCONTINUED | OUTPATIENT
Start: 2019-02-28 | End: 2019-03-02 | Stop reason: HOSPADM

## 2019-02-28 RX ORDER — ONDANSETRON 2 MG/ML
4 INJECTION INTRAMUSCULAR; INTRAVENOUS EVERY 30 MIN PRN
Status: DISCONTINUED | OUTPATIENT
Start: 2019-02-28 | End: 2019-02-28 | Stop reason: HOSPADM

## 2019-02-28 RX ORDER — HYDROMORPHONE HYDROCHLORIDE 1 MG/ML
.3-.5 INJECTION, SOLUTION INTRAMUSCULAR; INTRAVENOUS; SUBCUTANEOUS
Status: DISCONTINUED | OUTPATIENT
Start: 2019-02-28 | End: 2019-03-02 | Stop reason: HOSPADM

## 2019-02-28 RX ORDER — ONDANSETRON 2 MG/ML
4 INJECTION INTRAMUSCULAR; INTRAVENOUS EVERY 30 MIN PRN
Status: DISCONTINUED | OUTPATIENT
Start: 2019-02-28 | End: 2019-02-28

## 2019-02-28 RX ORDER — ONDANSETRON 4 MG/1
4 TABLET, ORALLY DISINTEGRATING ORAL EVERY 6 HOURS PRN
Status: DISCONTINUED | OUTPATIENT
Start: 2019-02-28 | End: 2019-03-02 | Stop reason: HOSPADM

## 2019-02-28 RX ORDER — NALOXONE HYDROCHLORIDE 0.4 MG/ML
.1-.4 INJECTION, SOLUTION INTRAMUSCULAR; INTRAVENOUS; SUBCUTANEOUS
Status: DISCONTINUED | OUTPATIENT
Start: 2019-02-28 | End: 2019-03-02 | Stop reason: HOSPADM

## 2019-02-28 RX ORDER — ACETAMINOPHEN 650 MG/1
650 SUPPOSITORY RECTAL EVERY 4 HOURS PRN
Status: DISCONTINUED | OUTPATIENT
Start: 2019-02-28 | End: 2019-03-02 | Stop reason: HOSPADM

## 2019-02-28 RX ORDER — SODIUM CHLORIDE 9 MG/ML
1000 INJECTION, SOLUTION INTRAVENOUS CONTINUOUS
Status: DISCONTINUED | OUTPATIENT
Start: 2019-02-28 | End: 2019-02-28

## 2019-02-28 RX ORDER — METOCLOPRAMIDE HYDROCHLORIDE 5 MG/ML
10 INJECTION INTRAMUSCULAR; INTRAVENOUS EVERY 6 HOURS PRN
Status: DISCONTINUED | OUTPATIENT
Start: 2019-02-28 | End: 2019-03-02 | Stop reason: HOSPADM

## 2019-02-28 RX ORDER — GLYCOPYRROLATE 0.2 MG/ML
INJECTION, SOLUTION INTRAMUSCULAR; INTRAVENOUS PRN
Status: DISCONTINUED | OUTPATIENT
Start: 2019-02-28 | End: 2019-02-28

## 2019-02-28 RX ORDER — AMPICILLIN AND SULBACTAM 2; 1 G/1; G/1
3 INJECTION, POWDER, FOR SOLUTION INTRAMUSCULAR; INTRAVENOUS EVERY 6 HOURS
Status: DISCONTINUED | OUTPATIENT
Start: 2019-02-28 | End: 2019-02-28

## 2019-02-28 RX ORDER — POTASSIUM CL/LIDO/0.9 % NACL 10MEQ/0.1L
10 INTRAVENOUS SOLUTION, PIGGYBACK (ML) INTRAVENOUS
Status: DISCONTINUED | OUTPATIENT
Start: 2019-02-28 | End: 2019-03-02 | Stop reason: HOSPADM

## 2019-02-28 RX ORDER — FENTANYL CITRATE 50 UG/ML
25-50 INJECTION, SOLUTION INTRAMUSCULAR; INTRAVENOUS
Status: DISCONTINUED | OUTPATIENT
Start: 2019-02-28 | End: 2019-02-28 | Stop reason: HOSPADM

## 2019-02-28 RX ORDER — AMPICILLIN AND SULBACTAM 2; 1 G/1; G/1
3 INJECTION, POWDER, FOR SOLUTION INTRAMUSCULAR; INTRAVENOUS ONCE
Status: COMPLETED | OUTPATIENT
Start: 2019-02-28 | End: 2019-02-28

## 2019-02-28 RX ORDER — ONDANSETRON 4 MG/1
4 TABLET, ORALLY DISINTEGRATING ORAL EVERY 6 HOURS PRN
Status: DISCONTINUED | OUTPATIENT
Start: 2019-02-28 | End: 2019-02-28

## 2019-02-28 RX ORDER — POTASSIUM CHLORIDE 7.45 MG/ML
10 INJECTION INTRAVENOUS
Status: DISCONTINUED | OUTPATIENT
Start: 2019-02-28 | End: 2019-03-02 | Stop reason: HOSPADM

## 2019-02-28 RX ORDER — HYDROMORPHONE HYDROCHLORIDE 1 MG/ML
.3-.5 INJECTION, SOLUTION INTRAMUSCULAR; INTRAVENOUS; SUBCUTANEOUS EVERY 5 MIN PRN
Status: DISCONTINUED | OUTPATIENT
Start: 2019-02-28 | End: 2019-02-28 | Stop reason: HOSPADM

## 2019-02-28 RX ORDER — ACETAMINOPHEN 325 MG/1
650 TABLET ORAL EVERY 4 HOURS PRN
Status: DISCONTINUED | OUTPATIENT
Start: 2019-02-28 | End: 2019-03-02 | Stop reason: HOSPADM

## 2019-02-28 RX ORDER — SODIUM CHLORIDE 9 MG/ML
INJECTION, SOLUTION INTRAVENOUS CONTINUOUS
Status: DISCONTINUED | OUTPATIENT
Start: 2019-02-28 | End: 2019-03-02 | Stop reason: HOSPADM

## 2019-02-28 RX ORDER — DEXAMETHASONE SODIUM PHOSPHATE 4 MG/ML
INJECTION, SOLUTION INTRA-ARTICULAR; INTRALESIONAL; INTRAMUSCULAR; INTRAVENOUS; SOFT TISSUE PRN
Status: DISCONTINUED | OUTPATIENT
Start: 2019-02-28 | End: 2019-02-28

## 2019-02-28 RX ORDER — PROCHLORPERAZINE 25 MG
25 SUPPOSITORY, RECTAL RECTAL EVERY 12 HOURS PRN
Status: DISCONTINUED | OUTPATIENT
Start: 2019-02-28 | End: 2019-03-02 | Stop reason: HOSPADM

## 2019-02-28 RX ORDER — PROPOFOL 10 MG/ML
INJECTION, EMULSION INTRAVENOUS PRN
Status: DISCONTINUED | OUTPATIENT
Start: 2019-02-28 | End: 2019-02-28

## 2019-02-28 RX ORDER — ONDANSETRON 2 MG/ML
4 INJECTION INTRAMUSCULAR; INTRAVENOUS EVERY 6 HOURS PRN
Status: DISCONTINUED | OUTPATIENT
Start: 2019-02-28 | End: 2019-03-02 | Stop reason: HOSPADM

## 2019-02-28 RX ORDER — METOPROLOL TARTRATE 100 MG
100 TABLET ORAL 2 TIMES DAILY
Status: DISCONTINUED | OUTPATIENT
Start: 2019-02-28 | End: 2019-03-02 | Stop reason: HOSPADM

## 2019-02-28 RX ORDER — HYDROMORPHONE HYDROCHLORIDE 1 MG/ML
0.5 INJECTION, SOLUTION INTRAMUSCULAR; INTRAVENOUS; SUBCUTANEOUS
Status: COMPLETED | OUTPATIENT
Start: 2019-02-28 | End: 2019-02-28

## 2019-02-28 RX ORDER — POTASSIUM CHLORIDE 29.8 MG/ML
20 INJECTION INTRAVENOUS
Status: DISCONTINUED | OUTPATIENT
Start: 2019-02-28 | End: 2019-03-02 | Stop reason: HOSPADM

## 2019-02-28 RX ORDER — ONDANSETRON 4 MG/1
4 TABLET, ORALLY DISINTEGRATING ORAL EVERY 30 MIN PRN
Status: DISCONTINUED | OUTPATIENT
Start: 2019-02-28 | End: 2019-02-28 | Stop reason: HOSPADM

## 2019-02-28 RX ORDER — LIDOCAINE HYDROCHLORIDE 10 MG/ML
INJECTION, SOLUTION INFILTRATION; PERINEURAL PRN
Status: DISCONTINUED | OUTPATIENT
Start: 2019-02-28 | End: 2019-02-28

## 2019-02-28 RX ORDER — PROCHLORPERAZINE MALEATE 10 MG
10 TABLET ORAL EVERY 6 HOURS PRN
Status: DISCONTINUED | OUTPATIENT
Start: 2019-02-28 | End: 2019-03-02 | Stop reason: HOSPADM

## 2019-02-28 RX ADMIN — SODIUM CHLORIDE, POTASSIUM CHLORIDE, SODIUM LACTATE AND CALCIUM CHLORIDE: 600; 310; 30; 20 INJECTION, SOLUTION INTRAVENOUS at 13:30

## 2019-02-28 RX ADMIN — ONDANSETRON 4 MG: 2 INJECTION INTRAMUSCULAR; INTRAVENOUS at 04:25

## 2019-02-28 RX ADMIN — PROPOFOL 200 MG: 10 INJECTION, EMULSION INTRAVENOUS at 12:30

## 2019-02-28 RX ADMIN — PHENYLEPHRINE HYDROCHLORIDE 100 MCG: 10 INJECTION, SOLUTION INTRAMUSCULAR; INTRAVENOUS; SUBCUTANEOUS at 12:45

## 2019-02-28 RX ADMIN — METOPROLOL TARTRATE 100 MG: 100 TABLET, FILM COATED ORAL at 20:36

## 2019-02-28 RX ADMIN — PROCHLORPERAZINE EDISYLATE 10 MG: 5 INJECTION INTRAMUSCULAR; INTRAVENOUS at 10:19

## 2019-02-28 RX ADMIN — FENTANYL CITRATE 100 MCG: 50 INJECTION, SOLUTION INTRAMUSCULAR; INTRAVENOUS at 11:43

## 2019-02-28 RX ADMIN — Medication 0.5 MG: at 04:25

## 2019-02-28 RX ADMIN — Medication 0.5 MG: at 16:20

## 2019-02-28 RX ADMIN — SODIUM CHLORIDE, POTASSIUM CHLORIDE, SODIUM LACTATE AND CALCIUM CHLORIDE: 600; 310; 30; 20 INJECTION, SOLUTION INTRAVENOUS at 12:50

## 2019-02-28 RX ADMIN — ONDANSETRON 4 MG: 2 INJECTION INTRAMUSCULAR; INTRAVENOUS at 09:26

## 2019-02-28 RX ADMIN — ACETAMINOPHEN 650 MG: 325 TABLET, FILM COATED ORAL at 23:56

## 2019-02-28 RX ADMIN — METOPROLOL TARTRATE 100 MG: 100 TABLET, FILM COATED ORAL at 07:53

## 2019-02-28 RX ADMIN — SODIUM CHLORIDE 1000 ML: 9 INJECTION, SOLUTION INTRAVENOUS at 04:25

## 2019-02-28 RX ADMIN — SODIUM CHLORIDE: 9 INJECTION, SOLUTION INTRAVENOUS at 23:37

## 2019-02-28 RX ADMIN — FENTANYL CITRATE 50 MCG: 50 INJECTION, SOLUTION INTRAMUSCULAR; INTRAVENOUS at 13:39

## 2019-02-28 RX ADMIN — LIDOCAINE HYDROCHLORIDE 40 MG: 10 INJECTION, SOLUTION INFILTRATION; PERINEURAL at 12:30

## 2019-02-28 RX ADMIN — Medication 0.5 MG: at 09:26

## 2019-02-28 RX ADMIN — FENTANYL CITRATE 50 MCG: 50 INJECTION, SOLUTION INTRAMUSCULAR; INTRAVENOUS at 13:47

## 2019-02-28 RX ADMIN — Medication 0.5 MG: at 18:54

## 2019-02-28 RX ADMIN — Medication 0.5 MG: at 07:23

## 2019-02-28 RX ADMIN — FENTANYL CITRATE 50 MCG: 50 INJECTION, SOLUTION INTRAMUSCULAR; INTRAVENOUS at 14:01

## 2019-02-28 RX ADMIN — PROPOFOL 50 MG: 10 INJECTION, EMULSION INTRAVENOUS at 12:57

## 2019-02-28 RX ADMIN — SODIUM CHLORIDE 1000 ML: 9 INJECTION, SOLUTION INTRAVENOUS at 05:50

## 2019-02-28 RX ADMIN — FAMOTIDINE 20 MG: 10 INJECTION INTRAVENOUS at 04:26

## 2019-02-28 RX ADMIN — PHENYLEPHRINE HYDROCHLORIDE 100 MCG: 10 INJECTION, SOLUTION INTRAMUSCULAR; INTRAVENOUS; SUBCUTANEOUS at 12:49

## 2019-02-28 RX ADMIN — Medication 0.5 MG: at 05:19

## 2019-02-28 RX ADMIN — GLYCOPYRROLATE 0.2 MG: 0.2 INJECTION, SOLUTION INTRAMUSCULAR; INTRAVENOUS at 12:30

## 2019-02-28 RX ADMIN — ONDANSETRON 4 MG: 2 INJECTION INTRAMUSCULAR; INTRAVENOUS at 12:30

## 2019-02-28 RX ADMIN — Medication 100 MG: at 12:30

## 2019-02-28 RX ADMIN — AMPICILLIN SODIUM AND SULBACTAM SODIUM 3 G: 2; 1 INJECTION, POWDER, FOR SOLUTION INTRAMUSCULAR; INTRAVENOUS at 16:27

## 2019-02-28 RX ADMIN — Medication 0.5 MG: at 06:23

## 2019-02-28 RX ADMIN — AMPICILLIN SODIUM AND SULBACTAM SODIUM 3 G: 2; 1 INJECTION, POWDER, FOR SOLUTION INTRAMUSCULAR; INTRAVENOUS at 05:50

## 2019-02-28 RX ADMIN — SODIUM CHLORIDE: 9 INJECTION, SOLUTION INTRAVENOUS at 07:00

## 2019-02-28 RX ADMIN — Medication 0.5 MG: at 23:56

## 2019-02-28 RX ADMIN — MIDAZOLAM 2 MG: 1 INJECTION INTRAMUSCULAR; INTRAVENOUS at 12:30

## 2019-02-28 RX ADMIN — DEXAMETHASONE SODIUM PHOSPHATE 4 MG: 4 INJECTION, SOLUTION INTRA-ARTICULAR; INTRALESIONAL; INTRAMUSCULAR; INTRAVENOUS; SOFT TISSUE at 12:30

## 2019-02-28 RX ADMIN — PANTOPRAZOLE SODIUM 40 MG: 40 INJECTION, POWDER, FOR SOLUTION INTRAVENOUS at 07:49

## 2019-02-28 RX ADMIN — PANTOPRAZOLE SODIUM 40 MG: 40 INJECTION, POWDER, FOR SOLUTION INTRAVENOUS at 20:36

## 2019-02-28 RX ADMIN — PHENYLEPHRINE HYDROCHLORIDE 100 MCG: 10 INJECTION, SOLUTION INTRAMUSCULAR; INTRAVENOUS; SUBCUTANEOUS at 12:30

## 2019-02-28 ASSESSMENT — ENCOUNTER SYMPTOMS
ABDOMINAL PAIN: 1
SEIZURES: 0
VOMITING: 1
DYSRHYTHMIAS: 0
DIAPHORESIS: 1
DIARRHEA: 1
APPETITE CHANGE: 1
NAUSEA: 1

## 2019-02-28 ASSESSMENT — MIFFLIN-ST. JEOR
SCORE: 2711.2
SCORE: 2708.48

## 2019-02-28 ASSESSMENT — ACTIVITIES OF DAILY LIVING (ADL)
ADLS_ACUITY_SCORE: 11

## 2019-02-28 ASSESSMENT — COPD QUESTIONNAIRES: COPD: 0

## 2019-02-28 NOTE — ED NOTES
[facility]  ED Nurse Handoff Report    Keo Pinto is a 33 year old male   ED Chief complaint: Abdominal Pain  . ED Diagnosis:   Final diagnoses:   Gallstone pancreatitis   Acute cholecystitis     Allergies: No Known Allergies    Code Status: Full Code  Activity level - Baseline/Home:  Independent. Activity Level - Current:   Stand with Assist. Lift room needed: No. Bariatric: No   Needed: No   Isolation: No. Infection: Not Applicable.     Vital Signs:   Vitals:    02/28/19 0356   BP: (!) 160/104   Pulse: 68   Resp: 20   Temp: 96  F (35.6  C)   TempSrc: Temporal   SpO2: 98%       Cardiac Rhythm:  ,      Pain level:    Patient confused: No. Patient Falls Risk: Yes.   Elimination Status: Has voided   Patient Report - Initial Complaint: Pt to ER with abd pain. Focused Assessment: Pt to ER with abd pain known gallstones pain getting worse   Tests Performed: Labs US. Abnormal Results: . Lipase 22,094, ,   Treatments provided: Meds  Family Comments: Father at bedside  OBS brochure/video discussed/provided to patient:  Yes  ED Medications:   Medications   0.9% sodium chloride BOLUS (1,000 mLs Intravenous New Bag 2/28/19 0425)     Followed by   sodium chloride 0.9% infusion (not administered)   HYDROmorphone (PF) (DILAUDID) injection 0.5 mg (0.5 mg Intravenous Given 2/28/19 0519)   ondansetron (ZOFRAN) injection 4 mg (4 mg Intravenous Given 2/28/19 0425)   ampicillin-sulbactam (UNASYN) 3 g vial to attach to  mL bag (not administered)   0.9% sodium chloride BOLUS (not administered)   famotidine (PEPCID) injection 20 mg (20 mg Intravenous Given 2/28/19 0426)     Drips infusing:  Yes  For the majority of the shift, the patient's behavior Green. Interventions performed wereN/A.     Severe Sepsis OR Septic Shock Diagnosis Present: No      ED Nurse Name/Phone Number: Bailey Du,   5:41 AM    RECEIVING UNIT ED HANDOFF REVIEW    Above ED Nurse Handoff Report was reviewed:  Yes  Reviewed by: Adrianna Reveles on February 28, 2019 at 6:13 AM

## 2019-02-28 NOTE — ED TRIAGE NOTES
Pt c/o RUQ pain. Said he has an appt on 3-14 with general surg for gall bladder out but pain bad tonight.     Pt A&O x 3, CMS x 3, ABCD's adequate in triage

## 2019-02-28 NOTE — ANESTHESIA PREPROCEDURE EVALUATION
Anesthesia Pre-Procedure Evaluation    Patient: Keo Pinto   MRN: [id number] : 1985          Preoperative Diagnosis: unknown    Procedure(s):  COMBINED ENDOSCOPIC ULTRASOUND, ESOPHAGOSCOPY, GASTROSCOPY, DUODENOSCOPY (EGD), FINE NEEDLE ASPIRATE/BIOPSY  ENDOSCOPIC RETROGRADE CHOLANGIOPANCREATOGRAM    Past Medical History:   Diagnosis Date     Esophageal reflux      Hypertension      Migraine headache      Past Surgical History:   Procedure Laterality Date     ADENOIDECTOMY       PE TUBES      As a young child     Anesthesia Evaluation     .             ROS/MED HX    ENT/Pulmonary:     (+)JUAN JOSE risk factors snores loudly, hypertension, obese, , . .   (-) asthma and COPD   Neurologic:      (-) seizures, CVA, TIA, Other neuro hx and Dementia   Cardiovascular:     (+) Dyslipidemia, hypertension----. : . . . :. .      (-) CAD, CHF, pacemaker, arrhythmias, pulmonary hypertension and pacemaker   METS/Exercise Tolerance:     Hematologic:        (-) anemia   Musculoskeletal:   (+) arthritis, , , -       GI/Hepatic:     (+) GERD      (-) hepatitis   Renal/Genitourinary:      (-) renal disease   Endo:     (+) Obesity, .   (-) Type I DM, Type II DM, thyroid disease, chronic steroid usage and other endocrine disorder   Psychiatric:     (+) psychiatric history anxiety      Infectious Disease:  - neg infectious disease ROS       Malignancy:      - no malignancy   Other:    - neg other ROS                             Lab Results   Component Value Date    WBC 11.7 (H) 2019    HGB 15.9 2019    HCT 46.3 2019     2019     2019    POTASSIUM 3.9 2019    CHLORIDE 107 2019    CO2 24 2019    BUN 6 (L) 2019    CR 0.84 2019     (H) 2019    SOREN 8.3 (L) 2019    MAG 2.2 2018    ALBUMIN 3.2 (L) 2019    PROTTOTAL 6.9 2019     (H) 2019     (H) 2019    ALKPHOS 152 (H) 2019    BILITOTAL 2.5 (H)  "02/28/2019    LIPASE 22,094 (H) 02/28/2019    TSH 3.03 05/22/2018       Preop Vitals  BP Readings from Last 3 Encounters:   02/28/19 144/85   01/28/19 132/84   01/22/19 138/90    Pulse Readings from Last 3 Encounters:   02/28/19 74   01/28/19 63   01/22/19 90      Resp Readings from Last 3 Encounters:   02/28/19 18   01/22/19 16   10/21/17 14    SpO2 Readings from Last 3 Encounters:   02/28/19 94%   01/28/19 98%   01/22/19 97%      Temp Readings from Last 1 Encounters:   02/28/19 98.3  F (36.8  C) (Temporal)    Ht Readings from Last 1 Encounters:   02/28/19 1.854 m (6' 1\")      Wt Readings from Last 1 Encounters:   02/28/19 (!) 171.5 kg (378 lb)    Estimated body mass index is 49.87 kg/m  as calculated from the following:    Height as of this encounter: 1.854 m (6' 1\").    Weight as of this encounter: 171.5 kg (378 lb).       Anesthesia Plan      History & Physical Review  History and physical reviewed and following examination; no interval change.    ASA Status:  3 .    NPO Status:  > 8 hours    Plan for General and ETT with Intravenous induction. Maintenance will be Balanced.    PONV prophylaxis:  Ondansetron (or other 5HT-3) and Dexamethasone or Solumedrol       Postoperative Care  Postoperative pain management:  IV analgesics, Multi-modal analgesia and Oral pain medications.      Consents  Anesthetic plan, risks, benefits and alternatives discussed with:  Patient..                 Elliot Avendano MD                    .  "

## 2019-02-28 NOTE — ANESTHESIA POSTPROCEDURE EVALUATION
Patient: Keo Pinto    Procedure(s):  COMBINED ENDOSCOPIC ULTRASOUND, Endoscopic Retrograde Cholangiopancreatoscopy  ENDOSCOPIC RETROGRADE CHOLANGIOPANCREATOGRAM with Sphincterotomy and Stone Extraction.    Diagnosis:unknown  Diagnosis Additional Information: choledocholithiasis    Anesthesia Type:  General, ETT    Note:  Anesthesia Post Evaluation    Patient location during evaluation: PACU  Patient participation: Able to fully participate in evaluation  Level of consciousness: awake and alert  Pain management: adequate  Airway patency: patent  Cardiovascular status: acceptable  Respiratory status: acceptable  Hydration status: acceptable  PONV: none     Anesthetic complications: None          Last vitals:  Vitals:    02/28/19 1415 02/28/19 1443 02/28/19 1500   BP:  148/85 151/85   Pulse:      Resp: 15 16 16   Temp: 97.8  F (36.6  C) 96.6  F (35.9  C) 96  F (35.6  C)   SpO2: 95% 96% 92%         Electronically Signed By: Keo Toro MD  February 28, 2019  5:16 PM

## 2019-02-28 NOTE — ANESTHESIA CARE TRANSFER NOTE
Patient: Keo Pinto    Procedure(s):  COMBINED ENDOSCOPIC ULTRASOUND, Endoscopic Retrograde Cholangiopancreatoscopy  ENDOSCOPIC RETROGRADE CHOLANGIOPANCREATOGRAM with Sphincterotomy and Stone Extraction.    Diagnosis: unknown  Diagnosis Additional Information: No value filed.    Anesthesia Type:   General, ETT     Note:  Airway :Nasal Cannula  Patient transferred to:PACU  Comments: Pt to PACU, VSS, reportto RNHandoff Report: Identifed the Patient, Identified the Reponsible Provider, Reviewed the pertinent medical history, Discussed the surgical course, Reviewed Intra-OP anesthesia mangement and issues during anesthesia, Set expectations for post-procedure period and Allowed opportunity for questions and acknowledgement of understanding      Vitals: (Last set prior to Anesthesia Care Transfer)    CRNA VITALS  2/28/2019 1254 - 2/28/2019 1335      2/28/2019             Resp Rate (observed):  10                Electronically Signed By: MARY Mcnair CRNA  February 28, 2019  1:35 PM

## 2019-02-28 NOTE — TELEPHONE ENCOUNTER
Telephone Call    D/w Maximilian Pinto his RUQ pain following meals, currently on the schedule for elective cholecystectomy with Dr Turner. He says his pain is getting worse and wants to have his gallbladder taken out before March 14th. Not taking any pain medication. Denies fevers or chills, nausea or vomiting. I advised Maximilian Pinto to try taking tylenol and ibuprofen. If he felt like he could not stand the pain overnight he could come to the emergency department, or alternatively call Dr Turner's office in the AM. He said he would try taking tylenol (small dose) and try calling Dr Turner's office in the AM.    --  Zeke Gordillo MD  General Surgery PGY2  [phone]

## 2019-02-28 NOTE — CONSULTS
GASTROENTEROLOGY CONSULTATION      Keo Pinto  [address]  [place] [address]  33 year old male     Admission Date/Time: 2/28/2019  Primary Care Provider: Tian Perez  Referring / Attending Physician:  Dr. Christina     We were asked to see the patient in consultation by Dr. Christina for evaluation of pancreatitis.        HPI:  Keo Pinto is a 33 year old male with history of hypertension and obesity who presents with acute worsening of abdominal pain. He has been followed as an outpatient regarding RUQ abdominal pain that has been present intermittently for months. He had recent US with gall stones and was referred to surgery with evaluation scheduled for 2 weeks out. The pain has worsened and has become constant. The pain became severe last night and prompting ED evaluation. He has associated nausea, emesis and loose stools. He has been eating small, bland diet given the worsening of pain. Denies recent alcohol use.       PAST MEDICAL HISTORY:  Patient Active Problem List    Diagnosis Date Noted     Gallstone pancreatitis 02/28/2019     Priority: Medium     Morbid obesity (H) 10/27/2015     Priority: Medium     CARDIOVASCULAR SCREENING; LDL GOAL LESS THAN 160 02/10/2010     Priority: Medium     Hypertension 09/03/2008     Priority: Medium     Anxiety state 02/29/2008     Priority: Medium     Problem list name updated by automated process. Provider to review       Esophageal reflux 02/29/2008     Priority: Medium     Insomnia 02/29/2008     Priority: Medium     Problem list name updated by automated process. Provider to review            ROS: A comprehensive ten point review of systems was negative aside from those in mentioned in the HPI.       MEDICATIONS:   Prior to Admission medications    Medication Sig Start Date End Date Taking? Authorizing Provider   acetaminophen (TYLENOL) 500 MG tablet Take 500-1,000 mg by mouth every 6 hours as needed for mild pain Max dose of 3000mg   Yes Unknown,  "Entered By History   Lidocaine-Menthol 4-4 % PTCH Externally apply topically daily as needed   Yes Unknown, Entered By History   lisinopril (PRINIVIL/ZESTRIL) 10 MG tablet Take 1 tablet (10 mg) by mouth daily 1/28/19  Yes Tian Perez MD   metoprolol succinate ER (TOPROL-XL) 200 MG 24 hr tablet Take 1 tablet (200 mg) by mouth daily 1/28/19  Yes Tian Perez MD   pantoprazole (PROTONIX) 40 MG EC tablet Take 1 tablet (40 mg) by mouth daily 1/11/19 2/28/19 Yes Slade Ray MD   sucralfate (CARAFATE) 1 GM tablet Take 1 tablet (1 g) by mouth 4 times daily for 10 days 1/28/19 2/28/19 Yes Tian Perez MD        ALLERGIES: No Known Allergies     SOCIAL HISTORY:  Social History     Tobacco Use     Smoking status: Never Smoker     Smokeless tobacco: Never Used   Substance Use Topics     Alcohol use: Yes     Comment: Rare     Drug use: No        FAMILY HISTORY:  Family History   Problem Relation Age of Onset     Neurologic Disorder Mother         Parkinsons, Migraine HA's     Hypertension Father      Alcohol/Drug Father      Heart Disease Maternal Grandfather         MI age 52     Heart Disease Paternal Grandfather         MI age 70's        PHYSICAL EXAM:     /87   Pulse 77   Temp 97.4  F (36.3  C) (Oral)   Resp 20   Ht 1.854 m (6' 1\")   Wt (!) 171.7 kg (378 lb 9.6 oz)   SpO2 98%   BMI 49.95 kg/m       PHYSICAL EXAM:  GENERAL: No acute distress  SKIN: no suspicious lesions, rashes, jaundice, or spider angiomas  HEAD: Normocephalic. Atraumatic.  NECK: Neck supple. No adenopathy.   EYES: +scleral icterus  RESPIRATORY: Good transmission. CTA bilaterally.   CARDIOVASCULAR: RRR, normal S1, S2,  No murmur appreciated  GASTROINTESTINAL: +BS, soft, TTP in RUQ and epigastric area, non distended, no hepatosplenomegaly, no masses/guarding/rebound  JOINT/EXTREMITIES:  no gross deformities noted, normal muscle tone  NEURO: CN 2-12 grossly intact, no focal deficits  PSYCH: Normal affect            "   ADDITIONAL COMMENTS:   I reviewed the patient's new clinical lab test results.   Recent Labs   Lab Test 02/28/19  0420 01/11/19  1803 05/22/18  0920   WBC 11.7* 11.3* 9.0   HGB 15.9 15.6 16.0   MCV 88 88 88    256 264     Recent Labs   Lab Test 02/28/19  0420 01/11/19  1803 05/22/18  0920   POTASSIUM 3.6 4.5 4.4   CHLORIDE 104 103 105   CO2 26 26 26   BUN 6* 11 9   ANIONGAP 7 8 8     Recent Labs   Lab Test 02/28/19  0420 01/11/19  1803 02/11/17  1827   ALBUMIN 3.6 4.0 3.7   BILITOTAL 3.1* 0.5 0.5   * 57 16   * 25 23   LIPASE 22,094*  --   --         IMAGING / ENDOSCOPY   US ABDOMEN LIMITED  2/28/2019 4:59 AM       HISTORY: Right upper quadrant pain.      COMPARISON: None.     FINDINGS: The liver is diffusely increased in echotexture consistent  with fatty infiltration. No focal mass. The left lobe is not well  seen. There is no intra or extrahepatic biliary dilatation. The common  hepatic duct measures 0.6 cm. There are multiple stones in the  gallbladder. The gallbladder wall is thickened to 0.6 cm. Probable  small amount of pericholecystic fluid. No sonographic Walter's sign.  The pancreas is completely obscured by bowel gas. The right kidney  measures 12.4 cm and is normal in appearance. The proximal abdominal  aorta and IVC appear normal.                                                                       IMPRESSION:  1. Cholelithiasis, gallbladder wall thickening, and a small amount of  pericholecystic fluid suggest acute cholecystitis.  2. No biliary dilatation.  3. Fatty infiltration of the liver.       CONSULTATION ASSESSMENT AND PLAN:    Keo Pinto is a 33 year old admitted with worsening RUQ pain in setting of known cholelithiasis. Lipase and LFT's elevated consistent with gall stone pancreatitis.     -EUS +/- ERCP this afternoon  -Continue conservative management of pancreatitis with NPO, IVF and pain management  -Will need surgical consult to discuss cholecystectomy      I  "discussed the patient plan with Dr. Hernandez. Thank you for asking us to participate in the care of this patient.    Janine Vitale PA-C  [facility]    Staff addednum: 33 yom admitted with 10/10 epigastric pain x 2 days. Has had previous episodes. Found to have elevated liver tests and gallstones. Thought to have gallstone pancreatitis. EUS/ERCP done today with removal of CBD stone.     /85   Pulse 74   Temp 98.3  F (36.8  C) (Temporal)   Resp 18   Ht 1.854 m (6' 1\")   Wt (!) 171.5 kg (378 lb)   SpO2 94%   BMI 49.87 kg/m    Gen: awake alert NAD  Abd: S ND obese +epigastric tenderness    A/P: 33 yom with gallstone pancreatitis post EUS/ERCP  -Clears today  -IVF  -Pain meds per hospitalist  -Cholecystectomy per surgery    Amina Hernandez MD  [facility]  Pager [phone]  Office [phone]    "

## 2019-02-28 NOTE — PHARMACY-ADMISSION MEDICATION HISTORY
Admission medication history interview status for this patient is complete. See [facility] admission navigator for allergy information, prior to admission medications and immunization status.     Medication history interview source(s):Patient  Medication history resources (including written lists, pill bottles, clinic record):None    Changes made to PTA medication list:  Added:   - OTC Lidocaine 4% patch - Applies one patch daily PRN  - Acetaminophen 500mg - Takes 2 tablets Q6H PRN  Deleted: none  Changed: none    Actions taken by pharmacist (provider contacted, etc):None     Additional medication history information:  - Patient indicates that he took 2 tablets of acetaminophen 500mg and placed an OTC lidocaine 4% patch on 2/27 at midnight. Verified with patient that the patch is removed and should not be placed while in the hospital.      Medication reconciliation/reorder completed by provider prior to medication history? No    Prior to Admission medications    Medication Sig Last Dose Taking? Auth Provider   acetaminophen (TYLENOL) 500 MG tablet Take 500-1,000 mg by mouth every 6 hours as needed for mild pain 2/27/2019 Yes Unknown, Entered By History   Lidocaine-Menthol 4-4 % PTCH Externally apply topically daily as needed 2/27/2019 Yes Unknown, Entered By History   lisinopril (PRINIVIL/ZESTRIL) 10 MG tablet Take 1 tablet (10 mg) by mouth daily 2/27/2019 at noon Yes Tian Perez MD   metoprolol succinate ER (TOPROL-XL) 200 MG 24 hr tablet Take 1 tablet (200 mg) by mouth daily 2/27/2019 at noon Yes Tian Perez MD   pantoprazole (PROTONIX) 40 MG EC tablet Take 1 tablet (40 mg) by mouth daily 2/27/2019 at noone Yes Slade Ray MD   sucralfate (CARAFATE) 1 GM tablet Take 1 tablet (1 g) by mouth 4 times daily for 10 days 2/27/2019 at PM Yes Tian Perez MD Hana Shin, PharmD IV Student

## 2019-02-28 NOTE — H&P
[facility]    Hospitalist History and Physical    Name: Keo Pinto    MRN: [id number]  YOB: 1985    Age: 33 year old  Date of Admission:  2/28/2019    Assessment & Plan   Keo Pinto is a 33 year old male who presented to the [facility] ER with abdominal pain.  He was found to have elevated LFT's and lipase.    Gallstone pancreatitis:  -  NPO, IVF, dilaudid IV PRN pain  -  Unclear if still obstruction present.  I will recheck labs in a few hours.  If they are worsening, likely will need ERCP.  I have asked GI to see him this AM.  I will also ask general surgery to consult as it sounds like he has had recurrent GB related discomfort for quite some time.   -  Empiric Unasyn this AM given possible ongoing obstruction    HTN:  -  Continue metoprolol.  I will break his large dose of extended release metoprolol into short acting BID to allow for better absorption.  If BP worsens, questions of absorption consider changing to IV.  -  Hold lisinopril this AM until BP trend is clear      DVT Prophylaxis: Pneumatic Compression Devices  Code Status: Full Code    Disposition: Expected discharge in 2-3 days once pancreatitis improved.    Primary Care Physician   Tian Perez    Chief Complaint   Abdominal pain    History is obtained from the patient.  I also spoke with the ER provider about the history.     History of Present Illness   Keo Pinto is a 33 year old male who presents with abdominal pain.  He reports a few weeks of recurrent acid reflux and RUQ discomfort.  He ultimately was referred for an ABD US earlier this week and it showed gallstones.  Shortly after he had the US his pain markedly worsened and he had progressive nausea.  This led him to present to the ER and he was found to have elevated LFT's and lipase.  He denies fevers, other acute complaints.  No recent bowel movement changes.    Past Medical History    Past Medical History:   Diagnosis Date     Esophageal reflux       Hypertension      Migraine headache        Past Surgical History   Past Surgical History:   Procedure Laterality Date     ADENOIDECTOMY       PE TUBES      As a young child       Prior to Admission Medications   Prior to Admission Medications   Prescriptions Last Dose Informant Patient Reported? Taking?   lisinopril (PRINIVIL/ZESTRIL) 10 MG tablet   No Yes   Sig: Take 1 tablet (10 mg) by mouth daily   metoprolol succinate ER (TOPROL-XL) 200 MG 24 hr tablet   No Yes   Sig: Take 1 tablet (200 mg) by mouth daily   pantoprazole (PROTONIX) 40 MG EC tablet   No Yes   Sig: Take 1 tablet (40 mg) by mouth daily   sucralfate (CARAFATE) 1 GM tablet   No Yes   Sig: Take 1 tablet (1 g) by mouth 4 times daily for 10 days      Facility-Administered Medications: None     Allergies   No Known Allergies    Social History   Social History     Tobacco Use     Smoking status: Never Smoker     Smokeless tobacco: Never Used   Substance Use Topics     Alcohol use: Yes     Comment: Rare   Student studying IT.    Family History   I have reviewed this patient's family history and updated it with pertinent information if needed.   Family History   Problem Relation Age of Onset     Neurologic Disorder Mother         Parkinsons, Migraine HA's     Hypertension Father      Alcohol/Drug Father      Heart Disease Maternal Grandfather         MI age 52     Heart Disease Paternal Grandfather         MI age 70's       Review of Systems   A Comprehensive greater than 10 system review of systems was carried out.  Pertinent positives and negatives are noted above.  Otherwise negative for contributory information.    Physical Exam   Temp: 96  F (35.6  C) Temp src: Temporal BP: 136/78 Pulse: 75   Resp: (P) 20 SpO2: 94 % O2 Device: None (Room air)    Vital Signs with Ranges  Temp:  [96  F (35.6  C)] 96  F (35.6  C)  Pulse:  [63-75] 75  Resp:  [20] (P) 20  BP: (116-160)/() 136/78  SpO2:  [94 %-98 %] 94 %  0 lbs 0 oz    GEN:  Alert, oriented x 3, appears  ill but comfortable at the moment.  HEENT:  Normocephalic/atraumatic, no scleral icterus, no nasal discharge, mouth moist.  CV:  Regular rate and rhythm, no loud murmur/rub.  LUNGS:  Clear to auscultation bilaterally without rales/rhonchi/wheezing/retractions.  Symmetric chest rise on inhalation noted.  ABD:  Hypoactive bowel sounds, obese, soft, tender central abdomen, mildly distended  No guarding/rigidity.  EXT:  No edema.  No cyanosis.  No acute joint synovitis noted.  SKIN:  Dry to touch, no exanthems noted in the visualized areas.  NEURO:  Symmetric muscle strength, sensation to touch grossly intact.  No new focal deficits appreciated.    Data   Data reviewed today:  I personally reviewed no images or EKG's today.    Recent Labs   Lab 02/28/19  0420   WBC 11.7*   HGB 15.9   HCT 46.3   MCV 88        Recent Labs   Lab 02/28/19  0420      POTASSIUM 3.6   CHLORIDE 104   CO2 26   ANIONGAP 7   *   BUN 6*   CR 0.96   GFRESTIMATED >90   GFRESTBLACK >90   SOREN 9.0   PROTTOTAL 7.6   ALBUMIN 3.6   BILITOTAL 3.1*   ALKPHOS 173*   *   *     Recent Labs   Lab 02/28/19  0420   LIPASE 22,094*       Recent Results (from the past 24 hour(s))   US Abdomen Limited    Narrative    US ABDOMEN LIMITED  2/28/2019 4:59 AM      HISTORY: Right upper quadrant pain.     COMPARISON: None.    FINDINGS: The liver is diffusely increased in echotexture consistent  with fatty infiltration. No focal mass. The left lobe is not well  seen. There is no intra or extrahepatic biliary dilatation. The common  hepatic duct measures 0.6 cm. There are multiple stones in the  gallbladder. The gallbladder wall is thickened to 0.6 cm. Probable  small amount of pericholecystic fluid. No sonographic Walter's sign.  The pancreas is completely obscured by bowel gas. The right kidney  measures 12.4 cm and is normal in appearance. The proximal abdominal  aorta and IVC appear normal.       Impression    IMPRESSION:  1.  Cholelithiasis, gallbladder wall thickening, and a small amount of  pericholecystic fluid suggest acute cholecystitis.  2. No biliary dilatation.  3. Fatty infiltration of the liver.    KAYKAY REYES MD

## 2019-02-28 NOTE — ED PROVIDER NOTES
History     Chief Complaint:  Abdominal Pain    The history is provided by the patient.      Keo Pinto is a 33 year old male who presents to the emergency department today for evaluation of right upper quadrant abdominal pain that worsened prior to arrival. He reports experiencing right upper quadrant abdominal pain since onset of reflux symptoms several weeks ago. Patient notes findings consistent for gallstones yesterday at [facility]. He reports consult with surgery is scheduled for 3/14 but presents tonight due to persistent and severe pain. In describing his pain, he states it initial feels like his reflux symptoms then evolves into a pain that feels like someone punched him in the stomach. He affirms secondary nausea, emesis, diarrhea, and cold sweats. He has not been able to control his pain with Tylenol. He denies any further concerns. He states he had a banana for dinner. He states he has refrained from spicy and fatty foods for the last several weeks.    Allergies:  No Known Drug Allergies    Medications:    Prinivil  Toprol  Protonix  Carafate    Past Medical History:    Esophageal reflux  Hypertension  Insomnia  Migraine     Past Surgical History:    Adenoidectomy    Family History:    Mother: parkinson's  Father: hypertension  Maternal grandfather: myocardial infarction    Social History:  Smoking Status: Never Smoker  Smokeless Tobacco: Never Used  Alcohol Use: Positive  Marital Status: Single      Review of Systems   Constitutional: Positive for appetite change (decreased) and diaphoresis.   Gastrointestinal: Positive for abdominal pain, diarrhea, nausea and vomiting.   All other systems reviewed and are negative.        Physical Exam     Patient Vitals for the past 24 hrs:   BP Temp Temp src Pulse Resp SpO2   02/28/19 0356 (!) 160/104 96  F (35.6  C) Temporal 68 20 98 %     Physical Exam  Constitutional:  Oriented to person, place, and time.   HENT:   Head:    Normocephalic.    Mouth/Throat:   Oropharynx is clear and moist.   Eyes:    EOM are normal. Pupils are equal, round, and reactive to light.   Neck:    Neck supple.   Cardiovascular:  Normal rate, regular rhythm and normal heart sounds.      Exam reveals no gallop and no friction rub.       No murmur heard.  Pulmonary/Chest:  Effort normal and breath sounds normal.      No respiratory distress. No wheezes. No rales.      No reproducible chest wall pain.  Abdominal:   Soft. No distension. Right upper quadrant tenderness. Positive Walter's sign. No rebound and no guarding.   Musculoskeletal:  Normal range of motion.   Neurological:   Alert and oriented to person, place, and time.           Moves all 4 extremities spontaneously    Skin:    No rash noted. No pallor.     Emergency Department Course     Imaging:  Radiology findings were communicated with the patient who voiced understanding of the findings.    US Abdomen Limited  1. Cholelithiasis, gallbladder wall thickening, and a small amount of  pericholecystic fluid suggest acute cholecystitis.  2. No biliary dilatation.  3. Fatty infiltration of the liver.  Reading per radiology    Laboratory:  Laboratory findings were communicated with the patient who voiced understanding of the findings.    CBC: WBC 11.7 (H), HGB 15.9,   CMP: glucose 114 (H) BUN 6 (L), bilirubin 3.1 (H), ALKPHOS 173 (H),  (HH),  (H) o/w WNL (Creatinine 0.96)  Lipase: 56054 (H)    Interventions:  0425: NS 1L IV Bolus   0425: Dilaudid 0.5 mg IV  0425: Zofran 4 mg IV  0426: Pepcid 20 mg IV  0519: Dilaudid 0.5 mg IV  Unasyn 3 g in  mL IV    Emergency Department Course:    0359 Nursing notes and vitals reviewed. I performed an exam of the patient as documented above.     0420 IV was inserted and blood was drawn for laboratory testing, results above.    0442 The patient was sent for an ultrasound while in the emergency department, results above.     0517 Patient rechecked and updated.      0234  I spoke with Dr. Christina of the hospitalist service regarding patient's presentation, findings, and plan of care.    I personally reviewed the imaging and laboratory results with the patient and answered all related questions prior to admission.    Impression & Plan      Medical Decision Making:  Keo Pinto is a 33 year old male that came in complaining of right upper quadrant abdominal pain that has been progressing over some time now. Differential includes cholecystitis, biliary colic, pancreatitis, gastritis, small bowel obstruction or other causes. Workup thus far otherwise points towards ultrasound findings that would likely suggest cholecystitis. With a lipase of 22,000, it would be consistent with acute pancreatitis. Despite his quite high elevated lipase, he is otherwise vitally stable and while patient is not pain-free, his pain is currently improving and controlled. Patient will need further admission and likely general surgery and gastroenterology consult. Initial does of Unasyn has been given for suspected cholecystitis.     Diagnosis:    ICD-10-CM   1. Gallstone pancreatitis K85.10   2. Acute cholecystitis K81.0     Disposition:   The patient is admitted into the care of Dr. Christina into a medical bed.    Scribe Disclosure:  I, Lauri Portillo, am serving as a scribe at 4:46 AM on 2/28/2019 to document services personally performed by Aris Guzmán MD based on my observations and the provider's statements to me.    [facility] EMERGENCY DEPARTMENT       Aris Guzmán MD  02/28/19 0644

## 2019-03-01 LAB
ALBUMIN SERPL-MCNC: 3 G/DL (ref 3.4–5)
ALP SERPL-CCNC: 131 U/L (ref 40–150)
ALT SERPL W P-5'-P-CCNC: 418 U/L (ref 0–70)
ANION GAP SERPL CALCULATED.3IONS-SCNC: 6 MMOL/L (ref 3–14)
AST SERPL W P-5'-P-CCNC: 194 U/L (ref 0–45)
BILIRUB SERPL-MCNC: 1.5 MG/DL (ref 0.2–1.3)
BUN SERPL-MCNC: 6 MG/DL (ref 7–30)
CALCIUM SERPL-MCNC: 7.8 MG/DL (ref 8.5–10.1)
CHLORIDE SERPL-SCNC: 107 MMOL/L (ref 94–109)
CO2 SERPL-SCNC: 24 MMOL/L (ref 20–32)
CREAT SERPL-MCNC: 0.78 MG/DL (ref 0.66–1.25)
ERYTHROCYTE [DISTWIDTH] IN BLOOD BY AUTOMATED COUNT: 13 % (ref 10–15)
GFR SERPL CREATININE-BSD FRML MDRD: >90 ML/MIN/{1.73_M2}
GLUCOSE SERPL-MCNC: 97 MG/DL (ref 70–99)
HCT VFR BLD AUTO: 44 % (ref 40–53)
HGB BLD-MCNC: 15 G/DL (ref 13.3–17.7)
LACTATE BLD-SCNC: 0.7 MMOL/L (ref 0.7–2)
LIPASE SERPL-CCNC: 2328 U/L (ref 73–393)
MCH RBC QN AUTO: 30.5 PG (ref 26.5–33)
MCHC RBC AUTO-ENTMCNC: 34.1 G/DL (ref 31.5–36.5)
MCV RBC AUTO: 89 FL (ref 78–100)
PLATELET # BLD AUTO: 243 10E9/L (ref 150–450)
POTASSIUM SERPL-SCNC: 3.7 MMOL/L (ref 3.4–5.3)
PROT SERPL-MCNC: 6.9 G/DL (ref 6.8–8.8)
RBC # BLD AUTO: 4.92 10E12/L (ref 4.4–5.9)
SODIUM SERPL-SCNC: 137 MMOL/L (ref 133–144)
WBC # BLD AUTO: 15.4 10E9/L (ref 4–11)

## 2019-03-01 PROCEDURE — 25800030 ZZH RX IP 258 OP 636: Performed by: INTERNAL MEDICINE

## 2019-03-01 PROCEDURE — 99232 SBSQ HOSP IP/OBS MODERATE 35: CPT | Performed by: INTERNAL MEDICINE

## 2019-03-01 PROCEDURE — 25000132 ZZH RX MED GY IP 250 OP 250 PS 637: Performed by: PHYSICIAN ASSISTANT

## 2019-03-01 PROCEDURE — 12000000 ZZH R&B MED SURG/OB

## 2019-03-01 PROCEDURE — 85027 COMPLETE CBC AUTOMATED: CPT | Performed by: INTERNAL MEDICINE

## 2019-03-01 PROCEDURE — 80053 COMPREHEN METABOLIC PANEL: CPT | Performed by: INTERNAL MEDICINE

## 2019-03-01 PROCEDURE — 25000128 H RX IP 250 OP 636: Performed by: INTERNAL MEDICINE

## 2019-03-01 PROCEDURE — 83690 ASSAY OF LIPASE: CPT | Performed by: INTERNAL MEDICINE

## 2019-03-01 PROCEDURE — 36415 COLL VENOUS BLD VENIPUNCTURE: CPT | Performed by: INTERNAL MEDICINE

## 2019-03-01 PROCEDURE — 25000132 ZZH RX MED GY IP 250 OP 250 PS 637: Performed by: INTERNAL MEDICINE

## 2019-03-01 PROCEDURE — 83605 ASSAY OF LACTIC ACID: CPT | Performed by: INTERNAL MEDICINE

## 2019-03-01 PROCEDURE — C9113 INJ PANTOPRAZOLE SODIUM, VIA: HCPCS | Performed by: INTERNAL MEDICINE

## 2019-03-01 RX ORDER — POLYETHYLENE GLYCOL 3350 17 G/17G
17 POWDER, FOR SOLUTION ORAL DAILY
Status: DISCONTINUED | OUTPATIENT
Start: 2019-03-01 | End: 2019-03-01

## 2019-03-01 RX ORDER — PANTOPRAZOLE SODIUM 40 MG/1
40 TABLET, DELAYED RELEASE ORAL
Status: DISCONTINUED | OUTPATIENT
Start: 2019-03-01 | End: 2019-03-02 | Stop reason: HOSPADM

## 2019-03-01 RX ORDER — POLYETHYLENE GLYCOL 3350 17 G/17G
17 POWDER, FOR SOLUTION ORAL DAILY
Status: DISCONTINUED | OUTPATIENT
Start: 2019-03-01 | End: 2019-03-02 | Stop reason: HOSPADM

## 2019-03-01 RX ADMIN — POLYETHYLENE GLYCOL 3350 17 G: 17 POWDER, FOR SOLUTION ORAL at 13:21

## 2019-03-01 RX ADMIN — ACETAMINOPHEN 650 MG: 325 TABLET, FILM COATED ORAL at 16:03

## 2019-03-01 RX ADMIN — METOPROLOL TARTRATE 100 MG: 100 TABLET, FILM COATED ORAL at 21:24

## 2019-03-01 RX ADMIN — ACETAMINOPHEN 650 MG: 325 TABLET, FILM COATED ORAL at 04:38

## 2019-03-01 RX ADMIN — SODIUM CHLORIDE: 9 INJECTION, SOLUTION INTRAVENOUS at 15:52

## 2019-03-01 RX ADMIN — PANTOPRAZOLE SODIUM 40 MG: 40 TABLET, DELAYED RELEASE ORAL at 15:52

## 2019-03-01 RX ADMIN — Medication 0.5 MG: at 08:01

## 2019-03-01 RX ADMIN — METOPROLOL TARTRATE 100 MG: 100 TABLET, FILM COATED ORAL at 08:01

## 2019-03-01 RX ADMIN — PANTOPRAZOLE SODIUM 40 MG: 40 INJECTION, POWDER, FOR SOLUTION INTRAVENOUS at 08:01

## 2019-03-01 RX ADMIN — PSYLLIUM HUSK 1 PACKET: 3.4 POWDER ORAL at 13:21

## 2019-03-01 RX ADMIN — SODIUM CHLORIDE: 9 INJECTION, SOLUTION INTRAVENOUS at 23:26

## 2019-03-01 RX ADMIN — ACETAMINOPHEN 650 MG: 325 TABLET, FILM COATED ORAL at 21:22

## 2019-03-01 ASSESSMENT — ACTIVITIES OF DAILY LIVING (ADL)
ADLS_ACUITY_SCORE: 11

## 2019-03-01 NOTE — PROGRESS NOTES
"GASTROENTEROLOGY PROGRESS NOTE     SUBJECTIVE:  Feels like he needs to pass flatus. Pain much improved following ERCP yesterday. Tolerating small amount of full liquids. No BM for a couple days.     OBJECTIVE:  /74 (BP Location: Right arm)   Pulse 86   Temp 96.9  F (36.1  C) (Oral)   Resp 18   Ht 1.854 m (6' 1\")   Wt (!) 171.5 kg (378 lb)   SpO2 94%   BMI 49.87 kg/m    Temp (24hrs), Av.9  F (36.6  C), Min:96  F (35.6  C), Max:100.3  F (37.9  C)    Patient Vitals for the past 72 hrs:   Weight   19 1101 (!) 171.5 kg (378 lb)   19 0652 (!) 171.7 kg (378 lb 9.6 oz)       Intake/Output Summary (Last 24 hours) at 3/1/2019 1207  Last data filed at 3/1/2019 0639  Gross per 24 hour   Intake 3196 ml   Output --   Net 3196 ml        PHYSICAL EXAM  Abd: soft, minimally tender epigastrium           Additional Comments:  ROS, FH, SH: See initial GI consult for details.     I have reviewed the patient's new clinical lab results:     Recent Labs   Lab Test 190 19  1803   WBC 15.4*  --  11.7* 11.3*   HGB 15.0  --  15.9 15.6   MCV 89  --  88 88    238 263 256     Recent Labs   Lab Test 19  0930 19  0420   POTASSIUM 3.7 3.9 3.6   CHLORIDE 107 107 104   CO2 24 24 26   BUN 6* 6* 6*   ANIONGAP 6 8 7     Recent Labs   Lab Test 19  0919  0420   ALBUMIN 3.0* 3.2* 3.6   BILITOTAL 1.5* 2.5* 3.1*   * 469* 519*   * 243* 274*   LIPASE 2,328*  --  22,094*     Assessment:    1. Gall stone pancreatitis. S/p EUS/ERCP with sphincterotomy and stone extraction . LFTs/bili overall improving. Lipase 22K on admit, down to 2300 today. Clinically pain better, tolerating full liquids.   --I don't see that surgery consult has been placed, ordered.   --Ok for full liquids for now.   --Miralax and Metamucil added to help with bowel function. Also instructed to hold narcotics if able.   --No further GI " intervention needed at this time. Timing of cholecystectomy per surgery.     Renee Lehman PA-C  [facility]

## 2019-03-01 NOTE — PLAN OF CARE
A&Ox4. Ambulating independently. Tolerating fulls. NS 125ml/hr. Inc loose stool. T max 99.2. Pain managed with IV dilaudid and tylenol. GI signed off, Surg consulted will follow up as outpt. Likely discharge tomorrow.

## 2019-03-01 NOTE — CONSULTS
General Surgery Consultation    Keo Pinto MRN# [id number]   Age: 33 year old YOB: 1985     Date of Admission:  2/28/2019    Reason for consult:            Gallstone pancreatitis       Requesting physician:            Renee Lehman PA-C                Assessment and Plan:   Assessment:   Keo Pinto is a 33 year old male with gallstone pancreatitis s/p ERCP with stone extraction yesterday. Symptoms improving today. Has eaten today and likely to discharge tomorrow so will perform as an outpatient.    Comorbidities:   has a past medical history of Esophageal reflux, Hypertension, and Migraine headache.      Plan:   I have offered the patient a laparoscopic cholecystectomy with intraoperative cholangiogram. This will be done as an outpatient - my office will call the patient next week to set up a date for next available.     We have discussed the indication, alternatives, risks and expected recovery.  Specifically we have discussed incisions, scarring, anesthesia, postoperative infections, bleeding, blood transfusion, open conversion, common bile duct injury, injury to intra-abdominal organs, retained common bile duct stone, post cholecystectomy diarrhea, recovery, postoperative dietary restrictions and physical limitations. We have discussed the recommended interventions and treatments for each of these potential complications.  All questions have been answered to the best of my ability.              Chief Complaint:   Abdominal pain     History is obtained from the patient. Parents present at bedside         History of Present Illness:   Keo Pinto is a 33 year old male who presented very early yesterday morning to the emergency room with worsening epigastric abdominal pain.    States he has been experiencing epigastric pain and right upper quadrant pain for the last 2 months.  Initially was being treated for presumed gastritis, with diet changes including avoiding spicy and fatty  foods.  He continued to have abdominal pain however and an patient ultrasound was performed 2/27 which showed gallstones.  He had schedule appointment with a surgeon for 3/14.  However his pain worse in the evening of 2/27 he presented to the ED 2/28 around 3 AM.   He had associated nausea diarrhea vomiting.  On arrival labs are consistent with pancreatitis.  He underwent ERCP and EUS yesterday.  He reports today his pain is improved he still having mild bloating.  He has been tolerating full liquid diet.            Past Medical History:    has a past medical history of Esophageal reflux, Hypertension, and Migraine headache.          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   Current Outpatient Medications on File Prior to Encounter:  acetaminophen (TYLENOL) 500 MG tablet Take 500-1,000 mg by mouth every 6 hours as needed for mild pain Max dose of 3000mg   Lidocaine-Menthol 4-4 % PTCH Externally apply topically daily as needed   lisinopril (PRINIVIL/ZESTRIL) 10 MG tablet Take 1 tablet (10 mg) by mouth daily   metoprolol succinate ER (TOPROL-XL) 200 MG  "24 hr tablet Take 1 tablet (200 mg) by mouth daily   [] pantoprazole (PROTONIX) 40 MG EC tablet Take 1 tablet (40 mg) by mouth daily   [] sucralfate (CARAFATE) 1 GM tablet Take 1 tablet (1 g) by mouth 4 times daily for 10 days       metoprolol tartrate  100 mg Oral BID     pantoprazole (PROTONIX) IV  40 mg Intravenous BID     polyethylene glycol  17 g Oral Daily     psyllium  1 packet Oral Daily            Review of Systems:   The 10 point review of systems is negative other than noted in the HPI.          Physical Exam:   /74 (BP Location: Right arm)   Pulse 86   Temp 96.9  F (36.1  C) (Oral)   Resp 18   Ht 1.854 m (6' 1\")   Wt (!) 171.5 kg (378 lb)   SpO2 94%   BMI 49.87 kg/m    General - Obese male in no apparent distress  HEENT:  Head normocephalic and atraumatic, pupils equal and round, conjunctivae clear, no scleral icterus, mucous membranes moist, external ears and nose normal  Neck: Normal range of motion  Lungs: Nonlabored on room air  Heart: Regular pulse  Abdomen: Abdomen obese, nondistended, tender in the epigastrium left upper quadrant right upper quadrant.  No scars  Extremities: Warm without edema  Neurologic: nonfocal  Psychiatric: Mood and affect appropriate  Skin: Without lesions, rashes, or juandice         Data:   Labs reviewed  Lipase on admission over 22,000, today around 2000  Total bilirubin 3.1 on admission, today 1.5  AST and ALT elevated but trending down  White count 15 today    Imaging Studies:  The ultrasound shows cholelithiasis, gallbladder wall thickening to 6 mm, pericholecystic fluid, CBD 6 mm       This note was created using voice recognition software. Undetected word substitutions or other errors may have occurred.     Maricel Lockett MD    Time spent with the patient, reviewing the EMR, laboratory and imaging studies, more than 50% of which was counseling and coordinating care:  25 minutes.           "

## 2019-03-01 NOTE — PROGRESS NOTES
[facility]    Medicine Progress Note - Hospitalist Service       Date of Admission:  2/28/2019  Assessment & Plan      Keo Pinto is a 33 year old male who [facility].  He was found to have elevated LFT's and lipase.  Patient was admitted and was closely monitored.  Gastroenterology team was consulted and patient had endoscopy as well as ERCP with extraction of stone from common bile duct.  Patient pancreatitis improved and diet started.     Gallstone pancreatitis:    --Improving, advance diet as tolerated.  Advance to full liquid diet today and could start regular diet in the evening if tolerated.  Staff to advance diet as tolerated  --Continue pain medications    Choledocholithiasis: Status post ERCP and stone extraction.  Appreciate GI input.    HTN:  -  Fairly controlled, continue home medications      Extreme obesity: Counseled to lose weight    Cholelithiasis: Surgery team consulted for further management recommendations        Diet: Advance Diet as Tolerated: Full Liquid Diet    DVT Prophylaxis: Pneumatic Compression Devices  Roger Catheter: not present  Code Status: Full Code      Disposition Plan   Expected discharge: Tomorrow, recommended to prior living arrangement once adequate pain management/ tolerating PO medications.  Entered: Irvin Jefferson MD 03/01/2019, 12:24 PM       The patient's care was discussed with the Patient.    Irvin Jefferson MD  Hospitalist Service  [facility]    ______________________________________________________________________    Interval History   Patient was seen and examined by me this morning.  He is feeling well with no significant pain.  No fever or chills.  No chest pain or shortness of breath.    Data reviewed today: I reviewed all medications, new labs and imaging results over the last 24 hours. I personally reviewed no images or EKG's today.    Physical Exam   Vital Signs: Temp: 96.9  F (36.1  C)  Temp src: Oral BP: 133/74 Pulse: 86 Heart Rate: 93 Resp: 18 SpO2: 94 % O2 Device: None (Room air) Oxygen Delivery: 2 LPM  Weight: 378 lbs 0 oz  He is alert and oriented x3.  No distress  Morbidly obese  Respiratory exam unremarkable with clear lungs bilaterally good air entry.    Cardiovascular exam is normal with regular rate and rhythm  Abdominal exam soft, mildly tender epigastrium with no rebound tenderness, rigidity   Extremity exam showed no peripheral edema.  Psych exam is normal  Neurologic exam shows nonfocal neurologic exam.    Data   All laboratory and imaging data in the past 24 hours reviewed     Recent Labs   Lab 03/01/19  0733 02/28/19  0930 02/28/19  0420   WBC 15.4*  --  11.7*   HGB 15.0  --  15.9   HCT 44.0  --  46.3   MCV 89  --  88    238 263     No results for input(s): CULT in the last 168 hours.  Recent Labs   Lab 03/01/19  0733 02/28/19  0930 02/28/19  0420    139 137   POTASSIUM 3.7 3.9 3.6   CHLORIDE 107 107 104   CO2 24 24 26   ANIONGAP 6 8 7   GLC 97 109* 114*   BUN 6* 6* 6*   CR 0.78 0.84 0.96   GFRESTIMATED >90 >90 >90   GFRESTBLACK >90 >90 >90   SOREN 7.8* 8.3* 9.0   PROTTOTAL 6.9 6.9 7.6   ALBUMIN 3.0* 3.2* 3.6   BILITOTAL 1.5* 2.5* 3.1*   ALKPHOS 131 152* 173*   * 243* 274*   * 469* 519*       Recent Labs   Lab 03/01/19  0733 02/28/19  0930 02/28/19  0420   GLC 97 109* 114*           No results for input(s): INR in the last 168 hours.        No results for input(s): TROPONIN, TROPI, TROPR in the last 168 hours.    Invalid input(s): TROP, TROPONINIES    Recent Results (from the past 48 hour(s))   US Abdomen Limited    Narrative    US ABDOMEN LIMITED  2/28/2019 4:59 AM      HISTORY: Right upper quadrant pain.     COMPARISON: None.    FINDINGS: The liver is diffusely increased in echotexture consistent  with fatty infiltration. No focal mass. The left lobe is not well  seen. There is no intra or extrahepatic biliary dilatation. The common  hepatic duct measures  0.6 cm. There are multiple stones in the  gallbladder. The gallbladder wall is thickened to 0.6 cm. Probable  small amount of pericholecystic fluid. No sonographic Walter's sign.  The pancreas is completely obscured by bowel gas. The right kidney  measures 12.4 cm and is normal in appearance. The proximal abdominal  aorta and IVC appear normal.       Impression    IMPRESSION:  1. Cholelithiasis, gallbladder wall thickening, and a small amount of  pericholecystic fluid suggest acute cholecystitis.  2. No biliary dilatation.  3. Fatty infiltration of the liver.    KAYKAY REYES MD   XR ERCP    Narrative    XR ERCP  2/28/2019 1:18 PM     HISTORY: ERCP.    COMPARISON: None.      Impression    IMPRESSION: Cine clip is noted during ERCP demonstrating contrast  within the biliary system. Small filling defect suggested in the  distal common bile duct.    OSMAN MARTÍNEZ MD

## 2019-03-01 NOTE — PLAN OF CARE
Pt sleeping on and off overnight stated some gas discomfort early morning and encouraged walking = effective, PRN dilaudid x1 and PRN tylenol x2 (max temp 100.3), independent with mobility, tolerating sips of clears, NS 125ml/hr. Expected discharge 2-3 days per notes.

## 2019-03-01 NOTE — PLAN OF CARE
A&Ox4  IV infusing  Up Ind  Tolerating small amount of a CL diet  LS clear  BS hypo in UQ, faint in LQ  Skin Intact  No c/o nausea  Pain controlled with IV dilaudid  Will cont to monitor per POC

## 2019-03-02 VITALS
BODY MASS INDEX: 41.75 KG/M2 | HEART RATE: 98 BPM | SYSTOLIC BLOOD PRESSURE: 144 MMHG | RESPIRATION RATE: 16 BRPM | TEMPERATURE: 97.8 F | WEIGHT: 315 LBS | DIASTOLIC BLOOD PRESSURE: 83 MMHG | HEIGHT: 73 IN | OXYGEN SATURATION: 91 %

## 2019-03-02 LAB
ALBUMIN SERPL-MCNC: 2.7 G/DL (ref 3.4–5)
ALP SERPL-CCNC: 96 U/L (ref 40–150)
ALT SERPL W P-5'-P-CCNC: 252 U/L (ref 0–70)
ANION GAP SERPL CALCULATED.3IONS-SCNC: 6 MMOL/L (ref 3–14)
AST SERPL W P-5'-P-CCNC: 68 U/L (ref 0–45)
BILIRUB SERPL-MCNC: 1.7 MG/DL (ref 0.2–1.3)
BUN SERPL-MCNC: 5 MG/DL (ref 7–30)
CALCIUM SERPL-MCNC: 7.9 MG/DL (ref 8.5–10.1)
CHLORIDE SERPL-SCNC: 104 MMOL/L (ref 94–109)
CO2 SERPL-SCNC: 27 MMOL/L (ref 20–32)
CREAT SERPL-MCNC: 0.79 MG/DL (ref 0.66–1.25)
ERYTHROCYTE [DISTWIDTH] IN BLOOD BY AUTOMATED COUNT: 12.9 % (ref 10–15)
GFR SERPL CREATININE-BSD FRML MDRD: >90 ML/MIN/{1.73_M2}
GLUCOSE SERPL-MCNC: 96 MG/DL (ref 70–99)
HCT VFR BLD AUTO: 41.8 % (ref 40–53)
HGB BLD-MCNC: 13.8 G/DL (ref 13.3–17.7)
MCH RBC QN AUTO: 29.9 PG (ref 26.5–33)
MCHC RBC AUTO-ENTMCNC: 33 G/DL (ref 31.5–36.5)
MCV RBC AUTO: 91 FL (ref 78–100)
PLATELET # BLD AUTO: 201 10E9/L (ref 150–450)
POTASSIUM SERPL-SCNC: 3.3 MMOL/L (ref 3.4–5.3)
PROT SERPL-MCNC: 6.7 G/DL (ref 6.8–8.8)
RBC # BLD AUTO: 4.61 10E12/L (ref 4.4–5.9)
SODIUM SERPL-SCNC: 137 MMOL/L (ref 133–144)
WBC # BLD AUTO: 13.1 10E9/L (ref 4–11)

## 2019-03-02 PROCEDURE — 25000132 ZZH RX MED GY IP 250 OP 250 PS 637: Performed by: INTERNAL MEDICINE

## 2019-03-02 PROCEDURE — 85027 COMPLETE CBC AUTOMATED: CPT | Performed by: INTERNAL MEDICINE

## 2019-03-02 PROCEDURE — 99238 HOSP IP/OBS DSCHRG MGMT 30/<: CPT | Performed by: INTERNAL MEDICINE

## 2019-03-02 PROCEDURE — 36415 COLL VENOUS BLD VENIPUNCTURE: CPT | Performed by: INTERNAL MEDICINE

## 2019-03-02 PROCEDURE — 25800030 ZZH RX IP 258 OP 636: Performed by: INTERNAL MEDICINE

## 2019-03-02 PROCEDURE — 25000132 ZZH RX MED GY IP 250 OP 250 PS 637: Performed by: PHYSICIAN ASSISTANT

## 2019-03-02 PROCEDURE — 80053 COMPREHEN METABOLIC PANEL: CPT | Performed by: INTERNAL MEDICINE

## 2019-03-02 RX ORDER — PANTOPRAZOLE SODIUM 40 MG/1
40 TABLET, DELAYED RELEASE ORAL DAILY
Qty: 30 TABLET | Refills: 0 | COMMUNITY
Start: 2019-03-02 | End: 2020-09-14

## 2019-03-02 RX ORDER — POTASSIUM CHLORIDE 1.5 G/1.58G
40 POWDER, FOR SOLUTION ORAL ONCE
Status: COMPLETED | OUTPATIENT
Start: 2019-03-02 | End: 2019-03-02

## 2019-03-02 RX ADMIN — METOPROLOL TARTRATE 100 MG: 100 TABLET, FILM COATED ORAL at 08:20

## 2019-03-02 RX ADMIN — PSYLLIUM HUSK 1 PACKET: 3.4 POWDER ORAL at 08:20

## 2019-03-02 RX ADMIN — SODIUM CHLORIDE: 9 INJECTION, SOLUTION INTRAVENOUS at 06:51

## 2019-03-02 RX ADMIN — ACETAMINOPHEN 650 MG: 325 TABLET, FILM COATED ORAL at 06:50

## 2019-03-02 RX ADMIN — POTASSIUM CHLORIDE 40 MEQ: 1.5 POWDER, FOR SOLUTION ORAL at 11:22

## 2019-03-02 RX ADMIN — PANTOPRAZOLE SODIUM 40 MG: 40 TABLET, DELAYED RELEASE ORAL at 06:48

## 2019-03-02 ASSESSMENT — ACTIVITIES OF DAILY LIVING (ADL)
ADLS_ACUITY_SCORE: 11

## 2019-03-02 NOTE — PLAN OF CARE
Pt  febrile, tylenol given .Denies pain.  LS clear, IS encouraged.  BS active, passing gas, reports 4 loose stools.  Reports  voiding ok.  Up independently.  IVF @ 125/hr.  Triggered sepsis, LA 0.7.

## 2019-03-02 NOTE — PLAN OF CARE
Discharged to home via parents transportation. AVS reviewed, no further questions. No new medications. Understands follow up instructions. IV removed CDI. Belongings returned. Potassium replaced orally, no recheck needed per MD.

## 2019-03-02 NOTE — DISCHARGE INSTRUCTIONS
Per surg: Laparoscopic cholecystectomy with intraoperative cholangiogram. This will be done as an outpatient - my office will call the patient next week to set up a date for next available.

## 2019-03-03 ENCOUNTER — HOSPITAL ENCOUNTER (OUTPATIENT)
Facility: CLINIC | Age: 34
Setting detail: OBSERVATION
Discharge: HOME | End: 2019-03-04
Attending: EMERGENCY MEDICINE | Admitting: HOSPITALIST
Payer: COMMERCIAL

## 2019-03-03 ENCOUNTER — NURSE TRIAGE (OUTPATIENT)
Dept: NURSING | Facility: CLINIC | Age: 34
End: 2019-03-03

## 2019-03-03 ENCOUNTER — ANESTHESIA EVENT (OUTPATIENT)
Dept: SURGERY | Facility: CLINIC | Age: 34
End: 2019-03-03
Payer: COMMERCIAL

## 2019-03-03 ENCOUNTER — ANESTHESIA (OUTPATIENT)
Dept: SURGERY | Facility: CLINIC | Age: 34
End: 2019-03-03
Payer: COMMERCIAL

## 2019-03-03 ENCOUNTER — APPOINTMENT (OUTPATIENT)
Dept: ULTRASOUND IMAGING | Facility: CLINIC | Age: 34
End: 2019-03-03
Attending: EMERGENCY MEDICINE
Payer: COMMERCIAL

## 2019-03-03 DIAGNOSIS — R10.11 RUQ ABDOMINAL PAIN: ICD-10-CM

## 2019-03-03 DIAGNOSIS — Z90.49 S/P LAPAROSCOPIC CHOLECYSTECTOMY: Primary | ICD-10-CM

## 2019-03-03 DIAGNOSIS — K81.0 ACUTE CHOLECYSTITIS: ICD-10-CM

## 2019-03-03 LAB
ALBUMIN SERPL-MCNC: 2.4 G/DL (ref 3.4–5)
ALP SERPL-CCNC: 83 U/L (ref 40–150)
ALT SERPL W P-5'-P-CCNC: 158 U/L (ref 0–70)
ANION GAP SERPL CALCULATED.3IONS-SCNC: 8 MMOL/L (ref 3–14)
AST SERPL W P-5'-P-CCNC: 28 U/L (ref 0–45)
BASOPHILS # BLD AUTO: 0 10E9/L (ref 0–0.2)
BASOPHILS NFR BLD AUTO: 0.2 %
BILIRUB SERPL-MCNC: 1.1 MG/DL (ref 0.2–1.3)
BUN SERPL-MCNC: 5 MG/DL (ref 7–30)
CALCIUM SERPL-MCNC: 8.1 MG/DL (ref 8.5–10.1)
CHLORIDE SERPL-SCNC: 103 MMOL/L (ref 94–109)
CO2 BLDCOV-SCNC: 24 MMOL/L (ref 21–28)
CO2 SERPL-SCNC: 26 MMOL/L (ref 20–32)
CREAT SERPL-MCNC: 0.66 MG/DL (ref 0.66–1.25)
DIFFERENTIAL METHOD BLD: ABNORMAL
EOSINOPHIL # BLD AUTO: 0.2 10E9/L (ref 0–0.7)
EOSINOPHIL NFR BLD AUTO: 1.5 %
ERYTHROCYTE [DISTWIDTH] IN BLOOD BY AUTOMATED COUNT: 12.4 % (ref 10–15)
GFR SERPL CREATININE-BSD FRML MDRD: >90 ML/MIN/{1.73_M2}
GLUCOSE BLDC GLUCOMTR-MCNC: 92 MG/DL (ref 70–99)
GLUCOSE SERPL-MCNC: 93 MG/DL (ref 70–99)
HCT VFR BLD AUTO: 38.6 % (ref 40–53)
HGB BLD-MCNC: 13 G/DL (ref 13.3–17.7)
IMM GRANULOCYTES # BLD: 0.1 10E9/L (ref 0–0.4)
IMM GRANULOCYTES NFR BLD: 0.7 %
LACTATE BLD-SCNC: 1.4 MMOL/L (ref 0.7–2.1)
LIPASE SERPL-CCNC: 101 U/L (ref 73–393)
LYMPHOCYTES # BLD AUTO: 1.2 10E9/L (ref 0.8–5.3)
LYMPHOCYTES NFR BLD AUTO: 10.8 %
MCH RBC QN AUTO: 30 PG (ref 26.5–33)
MCHC RBC AUTO-ENTMCNC: 33.7 G/DL (ref 31.5–36.5)
MCV RBC AUTO: 89 FL (ref 78–100)
MONOCYTES # BLD AUTO: 0.9 10E9/L (ref 0–1.3)
MONOCYTES NFR BLD AUTO: 8.1 %
NEUTROPHILS # BLD AUTO: 8.5 10E9/L (ref 1.6–8.3)
NEUTROPHILS NFR BLD AUTO: 78.7 %
NRBC # BLD AUTO: 0 10*3/UL
NRBC BLD AUTO-RTO: 0 /100
PCO2 BLDV: 33 MM HG (ref 40–50)
PH BLDV: 7.47 PH (ref 7.32–7.43)
PLATELET # BLD AUTO: 181 10E9/L (ref 150–450)
PO2 BLDV: 113 MM HG (ref 25–47)
POTASSIUM SERPL-SCNC: 3.1 MMOL/L (ref 3.4–5.3)
POTASSIUM SERPL-SCNC: 3.8 MMOL/L (ref 3.4–5.3)
PROT SERPL-MCNC: 6.7 G/DL (ref 6.8–8.8)
RBC # BLD AUTO: 4.33 10E12/L (ref 4.4–5.9)
SAO2 % BLDV FROM PO2: 99 %
SODIUM SERPL-SCNC: 137 MMOL/L (ref 133–144)
WBC # BLD AUTO: 10.8 10E9/L (ref 4–11)

## 2019-03-03 PROCEDURE — 25000128 H RX IP 250 OP 636: Performed by: EMERGENCY MEDICINE

## 2019-03-03 PROCEDURE — 88304 TISSUE EXAM BY PATHOLOGIST: CPT | Performed by: SURGERY

## 2019-03-03 PROCEDURE — 25000128 H RX IP 250 OP 636: Performed by: NURSE ANESTHETIST, CERTIFIED REGISTERED

## 2019-03-03 PROCEDURE — 25800030 ZZH RX IP 258 OP 636: Performed by: PHYSICIAN ASSISTANT

## 2019-03-03 PROCEDURE — 25000128 H RX IP 250 OP 636: Performed by: ANESTHESIOLOGY

## 2019-03-03 PROCEDURE — 71000013 ZZH RECOVERY PHASE 1 LEVEL 1 EA ADDTL HR: Performed by: SURGERY

## 2019-03-03 PROCEDURE — 83690 ASSAY OF LIPASE: CPT | Performed by: EMERGENCY MEDICINE

## 2019-03-03 PROCEDURE — 76705 ECHO EXAM OF ABDOMEN: CPT

## 2019-03-03 PROCEDURE — 25000128 H RX IP 250 OP 636: Performed by: PHYSICIAN ASSISTANT

## 2019-03-03 PROCEDURE — 96361 HYDRATE IV INFUSION ADD-ON: CPT | Mod: 59

## 2019-03-03 PROCEDURE — 96365 THER/PROPH/DIAG IV INF INIT: CPT

## 2019-03-03 PROCEDURE — 25800030 ZZH RX IP 258 OP 636: Performed by: EMERGENCY MEDICINE

## 2019-03-03 PROCEDURE — 25000125 ZZHC RX 250: Performed by: ANESTHESIOLOGY

## 2019-03-03 PROCEDURE — 36415 COLL VENOUS BLD VENIPUNCTURE: CPT | Performed by: INTERNAL MEDICINE

## 2019-03-03 PROCEDURE — 96376 TX/PRO/DX INJ SAME DRUG ADON: CPT

## 2019-03-03 PROCEDURE — G0378 HOSPITAL OBSERVATION PER HR: HCPCS

## 2019-03-03 PROCEDURE — 40000306 ZZH STATISTIC PRE PROC ASSESS II: Performed by: SURGERY

## 2019-03-03 PROCEDURE — 27210794 ZZH OR GENERAL SUPPLY STERILE: Performed by: SURGERY

## 2019-03-03 PROCEDURE — 00000146 ZZHCL STATISTIC GLUCOSE BY METER IP

## 2019-03-03 PROCEDURE — 37000008 ZZH ANESTHESIA TECHNICAL FEE, 1ST 30 MIN: Performed by: SURGERY

## 2019-03-03 PROCEDURE — 99285 EMERGENCY DEPT VISIT HI MDM: CPT | Mod: 25

## 2019-03-03 PROCEDURE — 84132 ASSAY OF SERUM POTASSIUM: CPT | Performed by: INTERNAL MEDICINE

## 2019-03-03 PROCEDURE — 88304 TISSUE EXAM BY PATHOLOGIST: CPT | Mod: 26 | Performed by: SURGERY

## 2019-03-03 PROCEDURE — 25000132 ZZH RX MED GY IP 250 OP 250 PS 637: Performed by: PHYSICIAN ASSISTANT

## 2019-03-03 PROCEDURE — 25800030 ZZH RX IP 258 OP 636: Performed by: ANESTHESIOLOGY

## 2019-03-03 PROCEDURE — 96366 THER/PROPH/DIAG IV INF ADDON: CPT | Mod: 59

## 2019-03-03 PROCEDURE — 80053 COMPREHEN METABOLIC PANEL: CPT | Performed by: EMERGENCY MEDICINE

## 2019-03-03 PROCEDURE — 47562 LAPAROSCOPIC CHOLECYSTECTOMY: CPT | Mod: AS | Performed by: PHYSICIAN ASSISTANT

## 2019-03-03 PROCEDURE — 96375 TX/PRO/DX INJ NEW DRUG ADDON: CPT

## 2019-03-03 PROCEDURE — 36000056 ZZH SURGERY LEVEL 3 1ST 30 MIN: Performed by: SURGERY

## 2019-03-03 PROCEDURE — 83605 ASSAY OF LACTIC ACID: CPT

## 2019-03-03 PROCEDURE — 82803 BLOOD GASES ANY COMBINATION: CPT

## 2019-03-03 PROCEDURE — 25000128 H RX IP 250 OP 636: Performed by: SURGERY

## 2019-03-03 PROCEDURE — 37000009 ZZH ANESTHESIA TECHNICAL FEE, EACH ADDTL 15 MIN: Performed by: SURGERY

## 2019-03-03 PROCEDURE — 99203 OFFICE O/P NEW LOW 30 MIN: CPT | Performed by: SURGERY

## 2019-03-03 PROCEDURE — 71000012 ZZH RECOVERY PHASE 1 LEVEL 1 FIRST HR: Performed by: SURGERY

## 2019-03-03 PROCEDURE — 85025 COMPLETE CBC W/AUTO DIFF WBC: CPT | Performed by: EMERGENCY MEDICINE

## 2019-03-03 PROCEDURE — 25800030 ZZH RX IP 258 OP 636: Performed by: SURGERY

## 2019-03-03 PROCEDURE — 36415 COLL VENOUS BLD VENIPUNCTURE: CPT | Performed by: EMERGENCY MEDICINE

## 2019-03-03 PROCEDURE — 47562 LAPAROSCOPIC CHOLECYSTECTOMY: CPT | Performed by: SURGERY

## 2019-03-03 PROCEDURE — 25000125 ZZHC RX 250: Performed by: NURSE ANESTHETIST, CERTIFIED REGISTERED

## 2019-03-03 PROCEDURE — 25000125 ZZHC RX 250: Performed by: SURGERY

## 2019-03-03 PROCEDURE — 25800025 ZZH RX 258: Performed by: SURGERY

## 2019-03-03 PROCEDURE — 99220 ZZC INITIAL OBSERVATION CARE,LEVL III: CPT | Performed by: PHYSICIAN ASSISTANT

## 2019-03-03 PROCEDURE — 36000058 ZZH SURGERY LEVEL 3 EA 15 ADDTL MIN: Performed by: SURGERY

## 2019-03-03 RX ORDER — LIDOCAINE HYDROCHLORIDE 10 MG/ML
INJECTION, SOLUTION INFILTRATION; PERINEURAL PRN
Status: DISCONTINUED | OUTPATIENT
Start: 2019-03-03 | End: 2019-03-03

## 2019-03-03 RX ORDER — ACETAMINOPHEN 650 MG/1
650 SUPPOSITORY RECTAL EVERY 4 HOURS PRN
Status: DISCONTINUED | OUTPATIENT
Start: 2019-03-03 | End: 2019-03-03

## 2019-03-03 RX ORDER — PROCHLORPERAZINE MALEATE 5 MG
10 TABLET ORAL EVERY 6 HOURS PRN
Status: DISCONTINUED | OUTPATIENT
Start: 2019-03-03 | End: 2019-03-04 | Stop reason: HOSPADM

## 2019-03-03 RX ORDER — FENTANYL CITRATE 50 UG/ML
25-50 INJECTION, SOLUTION INTRAMUSCULAR; INTRAVENOUS
Status: DISCONTINUED | OUTPATIENT
Start: 2019-03-03 | End: 2019-03-03 | Stop reason: HOSPADM

## 2019-03-03 RX ORDER — MEPERIDINE HYDROCHLORIDE 25 MG/ML
12.5 INJECTION INTRAMUSCULAR; INTRAVENOUS; SUBCUTANEOUS
Status: DISCONTINUED | OUTPATIENT
Start: 2019-03-03 | End: 2019-03-03 | Stop reason: HOSPADM

## 2019-03-03 RX ORDER — LIDOCAINE 40 MG/G
CREAM TOPICAL
Status: DISCONTINUED | OUTPATIENT
Start: 2019-03-03 | End: 2019-03-03

## 2019-03-03 RX ORDER — ONDANSETRON 2 MG/ML
4 INJECTION INTRAMUSCULAR; INTRAVENOUS ONCE
Status: COMPLETED | OUTPATIENT
Start: 2019-03-03 | End: 2019-03-03

## 2019-03-03 RX ORDER — DIMENHYDRINATE 50 MG/ML
25 INJECTION, SOLUTION INTRAMUSCULAR; INTRAVENOUS
Status: DISCONTINUED | OUTPATIENT
Start: 2019-03-03 | End: 2019-03-03 | Stop reason: HOSPADM

## 2019-03-03 RX ORDER — PANTOPRAZOLE SODIUM 40 MG/1
40 TABLET, DELAYED RELEASE ORAL DAILY
Status: DISCONTINUED | OUTPATIENT
Start: 2019-03-03 | End: 2019-03-04 | Stop reason: HOSPADM

## 2019-03-03 RX ORDER — CEFOXITIN 2 G/1
2 INJECTION, POWDER, FOR SOLUTION INTRAVENOUS ONCE
Status: COMPLETED | OUTPATIENT
Start: 2019-03-03 | End: 2019-03-03

## 2019-03-03 RX ORDER — SODIUM CHLORIDE, SODIUM LACTATE, POTASSIUM CHLORIDE, CALCIUM CHLORIDE 600; 310; 30; 20 MG/100ML; MG/100ML; MG/100ML; MG/100ML
INJECTION, SOLUTION INTRAVENOUS CONTINUOUS
Status: DISCONTINUED | OUTPATIENT
Start: 2019-03-03 | End: 2019-03-03 | Stop reason: HOSPADM

## 2019-03-03 RX ORDER — POLYETHYLENE GLYCOL 3350 17 G/17G
17 POWDER, FOR SOLUTION ORAL DAILY PRN
Status: DISCONTINUED | OUTPATIENT
Start: 2019-03-03 | End: 2019-03-04 | Stop reason: HOSPADM

## 2019-03-03 RX ORDER — PROCHLORPERAZINE MALEATE 5 MG
10 TABLET ORAL EVERY 6 HOURS PRN
Status: DISCONTINUED | OUTPATIENT
Start: 2019-03-03 | End: 2019-03-03

## 2019-03-03 RX ORDER — METOPROLOL SUCCINATE 100 MG/1
200 TABLET, EXTENDED RELEASE ORAL DAILY
Status: DISCONTINUED | OUTPATIENT
Start: 2019-03-03 | End: 2019-03-04 | Stop reason: HOSPADM

## 2019-03-03 RX ORDER — ONDANSETRON 2 MG/ML
4 INJECTION INTRAMUSCULAR; INTRAVENOUS EVERY 6 HOURS PRN
Status: DISCONTINUED | OUTPATIENT
Start: 2019-03-03 | End: 2019-03-04 | Stop reason: HOSPADM

## 2019-03-03 RX ORDER — CEFAZOLIN SODIUM IN 0.9 % NACL 3 G/100 ML
3 INTRAVENOUS SOLUTION, PIGGYBACK (ML) INTRAVENOUS
Status: COMPLETED | OUTPATIENT
Start: 2019-03-03 | End: 2019-03-03

## 2019-03-03 RX ORDER — ONDANSETRON 4 MG/1
4 TABLET, ORALLY DISINTEGRATING ORAL EVERY 30 MIN PRN
Status: DISCONTINUED | OUTPATIENT
Start: 2019-03-03 | End: 2019-03-03 | Stop reason: HOSPADM

## 2019-03-03 RX ORDER — AMOXICILLIN 250 MG
1 CAPSULE ORAL 2 TIMES DAILY PRN
Status: DISCONTINUED | OUTPATIENT
Start: 2019-03-03 | End: 2019-03-03

## 2019-03-03 RX ORDER — LIDOCAINE 40 MG/G
CREAM TOPICAL
Status: DISCONTINUED | OUTPATIENT
Start: 2019-03-03 | End: 2019-03-04 | Stop reason: HOSPADM

## 2019-03-03 RX ORDER — LISINOPRIL 10 MG/1
10 TABLET ORAL DAILY
Status: DISCONTINUED | OUTPATIENT
Start: 2019-03-03 | End: 2019-03-04 | Stop reason: HOSPADM

## 2019-03-03 RX ORDER — LIDOCAINE 40 MG/G
CREAM TOPICAL
Status: DISCONTINUED | OUTPATIENT
Start: 2019-03-03 | End: 2019-03-03 | Stop reason: HOSPADM

## 2019-03-03 RX ORDER — OXYCODONE HYDROCHLORIDE 5 MG/1
5-10 TABLET ORAL
Status: DISCONTINUED | OUTPATIENT
Start: 2019-03-03 | End: 2019-03-04 | Stop reason: HOSPADM

## 2019-03-03 RX ORDER — POTASSIUM CHLORIDE 1500 MG/1
20-40 TABLET, EXTENDED RELEASE ORAL
Status: DISCONTINUED | OUTPATIENT
Start: 2019-03-03 | End: 2019-03-04 | Stop reason: HOSPADM

## 2019-03-03 RX ORDER — POTASSIUM CL/LIDO/0.9 % NACL 10MEQ/0.1L
10 INTRAVENOUS SOLUTION, PIGGYBACK (ML) INTRAVENOUS
Status: DISCONTINUED | OUTPATIENT
Start: 2019-03-03 | End: 2019-03-04 | Stop reason: HOSPADM

## 2019-03-03 RX ORDER — ONDANSETRON 2 MG/ML
INJECTION INTRAMUSCULAR; INTRAVENOUS PRN
Status: DISCONTINUED | OUTPATIENT
Start: 2019-03-03 | End: 2019-03-03

## 2019-03-03 RX ORDER — NALOXONE HYDROCHLORIDE 0.4 MG/ML
.1-.4 INJECTION, SOLUTION INTRAMUSCULAR; INTRAVENOUS; SUBCUTANEOUS
Status: DISCONTINUED | OUTPATIENT
Start: 2019-03-03 | End: 2019-03-04 | Stop reason: HOSPADM

## 2019-03-03 RX ORDER — DEXAMETHASONE SODIUM PHOSPHATE 4 MG/ML
INJECTION, SOLUTION INTRA-ARTICULAR; INTRALESIONAL; INTRAMUSCULAR; INTRAVENOUS; SOFT TISSUE PRN
Status: DISCONTINUED | OUTPATIENT
Start: 2019-03-03 | End: 2019-03-03

## 2019-03-03 RX ORDER — NALOXONE HYDROCHLORIDE 0.4 MG/ML
.1-.4 INJECTION, SOLUTION INTRAMUSCULAR; INTRAVENOUS; SUBCUTANEOUS
Status: DISCONTINUED | OUTPATIENT
Start: 2019-03-03 | End: 2019-03-03 | Stop reason: HOSPADM

## 2019-03-03 RX ORDER — POTASSIUM CHLORIDE 7.45 MG/ML
10 INJECTION INTRAVENOUS
Status: DISCONTINUED | OUTPATIENT
Start: 2019-03-03 | End: 2019-03-04 | Stop reason: HOSPADM

## 2019-03-03 RX ORDER — FENTANYL CITRATE 50 UG/ML
INJECTION, SOLUTION INTRAMUSCULAR; INTRAVENOUS PRN
Status: DISCONTINUED | OUTPATIENT
Start: 2019-03-03 | End: 2019-03-03

## 2019-03-03 RX ORDER — SODIUM CHLORIDE, SODIUM LACTATE, POTASSIUM CHLORIDE, CALCIUM CHLORIDE 600; 310; 30; 20 MG/100ML; MG/100ML; MG/100ML; MG/100ML
INJECTION, SOLUTION INTRAVENOUS CONTINUOUS
Status: DISCONTINUED | OUTPATIENT
Start: 2019-03-03 | End: 2019-03-03

## 2019-03-03 RX ORDER — POTASSIUM CHLORIDE 1.5 G/1.58G
20-40 POWDER, FOR SOLUTION ORAL
Status: DISCONTINUED | OUTPATIENT
Start: 2019-03-03 | End: 2019-03-04 | Stop reason: HOSPADM

## 2019-03-03 RX ORDER — ONDANSETRON 2 MG/ML
4 INJECTION INTRAMUSCULAR; INTRAVENOUS EVERY 6 HOURS PRN
Status: DISCONTINUED | OUTPATIENT
Start: 2019-03-03 | End: 2019-03-03

## 2019-03-03 RX ORDER — ONDANSETRON 4 MG/1
4 TABLET, ORALLY DISINTEGRATING ORAL EVERY 6 HOURS PRN
Status: DISCONTINUED | OUTPATIENT
Start: 2019-03-03 | End: 2019-03-03

## 2019-03-03 RX ORDER — KETOROLAC TROMETHAMINE 15 MG/ML
15 INJECTION, SOLUTION INTRAMUSCULAR; INTRAVENOUS ONCE
Status: DISCONTINUED | OUTPATIENT
Start: 2019-03-03 | End: 2019-03-03

## 2019-03-03 RX ORDER — ALBUTEROL SULFATE 0.83 MG/ML
2.5 SOLUTION RESPIRATORY (INHALATION) EVERY 4 HOURS PRN
Status: DISCONTINUED | OUTPATIENT
Start: 2019-03-03 | End: 2019-03-03 | Stop reason: HOSPADM

## 2019-03-03 RX ORDER — NEOSTIGMINE METHYLSULFATE 1 MG/ML
VIAL (ML) INJECTION PRN
Status: DISCONTINUED | OUTPATIENT
Start: 2019-03-03 | End: 2019-03-03

## 2019-03-03 RX ORDER — BUPIVACAINE HYDROCHLORIDE AND EPINEPHRINE 5; 5 MG/ML; UG/ML
INJECTION, SOLUTION PERINEURAL PRN
Status: DISCONTINUED | OUTPATIENT
Start: 2019-03-03 | End: 2019-03-03 | Stop reason: HOSPADM

## 2019-03-03 RX ORDER — NALOXONE HYDROCHLORIDE 0.4 MG/ML
.1-.4 INJECTION, SOLUTION INTRAMUSCULAR; INTRAVENOUS; SUBCUTANEOUS
Status: DISCONTINUED | OUTPATIENT
Start: 2019-03-03 | End: 2019-03-03

## 2019-03-03 RX ORDER — CEFAZOLIN SODIUM 1 G/3ML
1 INJECTION, POWDER, FOR SOLUTION INTRAMUSCULAR; INTRAVENOUS SEE ADMIN INSTRUCTIONS
Status: DISCONTINUED | OUTPATIENT
Start: 2019-03-03 | End: 2019-03-03 | Stop reason: HOSPADM

## 2019-03-03 RX ORDER — GLYCOPYRROLATE 0.2 MG/ML
INJECTION, SOLUTION INTRAMUSCULAR; INTRAVENOUS PRN
Status: DISCONTINUED | OUTPATIENT
Start: 2019-03-03 | End: 2019-03-03

## 2019-03-03 RX ORDER — PROPOFOL 10 MG/ML
INJECTION, EMULSION INTRAVENOUS PRN
Status: DISCONTINUED | OUTPATIENT
Start: 2019-03-03 | End: 2019-03-03

## 2019-03-03 RX ORDER — ONDANSETRON 4 MG/1
4 TABLET, ORALLY DISINTEGRATING ORAL EVERY 6 HOURS PRN
Status: DISCONTINUED | OUTPATIENT
Start: 2019-03-03 | End: 2019-03-04 | Stop reason: HOSPADM

## 2019-03-03 RX ORDER — ACETAMINOPHEN 325 MG/1
650 TABLET ORAL EVERY 4 HOURS PRN
Status: DISCONTINUED | OUTPATIENT
Start: 2019-03-03 | End: 2019-03-03

## 2019-03-03 RX ORDER — HYDROMORPHONE HYDROCHLORIDE 1 MG/ML
0.5 INJECTION, SOLUTION INTRAMUSCULAR; INTRAVENOUS; SUBCUTANEOUS EVERY 30 MIN PRN
Status: DISCONTINUED | OUTPATIENT
Start: 2019-03-03 | End: 2019-03-03

## 2019-03-03 RX ORDER — AMOXICILLIN 250 MG
2 CAPSULE ORAL 2 TIMES DAILY PRN
Status: DISCONTINUED | OUTPATIENT
Start: 2019-03-03 | End: 2019-03-03

## 2019-03-03 RX ORDER — FENTANYL CITRATE 50 UG/ML
50 INJECTION, SOLUTION INTRAMUSCULAR; INTRAVENOUS
Status: DISCONTINUED | OUTPATIENT
Start: 2019-03-03 | End: 2019-03-03

## 2019-03-03 RX ORDER — HYDROMORPHONE HYDROCHLORIDE 1 MG/ML
.3-.5 INJECTION, SOLUTION INTRAMUSCULAR; INTRAVENOUS; SUBCUTANEOUS
Status: DISCONTINUED | OUTPATIENT
Start: 2019-03-03 | End: 2019-03-04 | Stop reason: HOSPADM

## 2019-03-03 RX ORDER — HYDROMORPHONE HYDROCHLORIDE 1 MG/ML
.3-.5 INJECTION, SOLUTION INTRAMUSCULAR; INTRAVENOUS; SUBCUTANEOUS
Status: DISCONTINUED | OUTPATIENT
Start: 2019-03-03 | End: 2019-03-03

## 2019-03-03 RX ORDER — ONDANSETRON 2 MG/ML
4 INJECTION INTRAMUSCULAR; INTRAVENOUS EVERY 30 MIN PRN
Status: DISCONTINUED | OUTPATIENT
Start: 2019-03-03 | End: 2019-03-03 | Stop reason: HOSPADM

## 2019-03-03 RX ORDER — DEXTROSE MONOHYDRATE, SODIUM CHLORIDE, AND POTASSIUM CHLORIDE 50; 1.49; 4.5 G/1000ML; G/1000ML; G/1000ML
INJECTION, SOLUTION INTRAVENOUS CONTINUOUS
Status: DISCONTINUED | OUTPATIENT
Start: 2019-03-03 | End: 2019-03-04 | Stop reason: HOSPADM

## 2019-03-03 RX ORDER — OXYCODONE HYDROCHLORIDE 5 MG/1
5-10 TABLET ORAL
Status: DISCONTINUED | OUTPATIENT
Start: 2019-03-03 | End: 2019-03-03

## 2019-03-03 RX ORDER — METOPROLOL TARTRATE 1 MG/ML
1-2 INJECTION, SOLUTION INTRAVENOUS EVERY 5 MIN PRN
Status: DISCONTINUED | OUTPATIENT
Start: 2019-03-03 | End: 2019-03-03 | Stop reason: HOSPADM

## 2019-03-03 RX ORDER — POTASSIUM CHLORIDE 29.8 MG/ML
20 INJECTION INTRAVENOUS
Status: DISCONTINUED | OUTPATIENT
Start: 2019-03-03 | End: 2019-03-04 | Stop reason: HOSPADM

## 2019-03-03 RX ORDER — PROCHLORPERAZINE 25 MG
25 SUPPOSITORY, RECTAL RECTAL EVERY 12 HOURS PRN
Status: DISCONTINUED | OUTPATIENT
Start: 2019-03-03 | End: 2019-03-03

## 2019-03-03 RX ADMIN — SODIUM CHLORIDE, POTASSIUM CHLORIDE, SODIUM LACTATE AND CALCIUM CHLORIDE: 600; 310; 30; 20 INJECTION, SOLUTION INTRAVENOUS at 11:16

## 2019-03-03 RX ADMIN — PROPOFOL 50 MG: 10 INJECTION, EMULSION INTRAVENOUS at 15:33

## 2019-03-03 RX ADMIN — SODIUM CHLORIDE, POTASSIUM CHLORIDE, SODIUM LACTATE AND CALCIUM CHLORIDE 500 ML: 600; 310; 30; 20 INJECTION, SOLUTION INTRAVENOUS at 06:25

## 2019-03-03 RX ADMIN — CEFOXITIN SODIUM 2 G: 2 POWDER, FOR SOLUTION INTRAVENOUS at 07:49

## 2019-03-03 RX ADMIN — ROCURONIUM BROMIDE 10 MG: 10 INJECTION INTRAVENOUS at 15:51

## 2019-03-03 RX ADMIN — MIDAZOLAM 2 MG: 1 INJECTION INTRAMUSCULAR; INTRAVENOUS at 15:00

## 2019-03-03 RX ADMIN — ROCURONIUM BROMIDE 10 MG: 10 INJECTION INTRAVENOUS at 15:28

## 2019-03-03 RX ADMIN — FENTANYL CITRATE 250 MCG: 50 INJECTION, SOLUTION INTRAMUSCULAR; INTRAVENOUS at 15:10

## 2019-03-03 RX ADMIN — LISINOPRIL 10 MG: 10 TABLET ORAL at 10:06

## 2019-03-03 RX ADMIN — FENTANYL CITRATE 50 MCG: 50 INJECTION, SOLUTION INTRAMUSCULAR; INTRAVENOUS at 16:36

## 2019-03-03 RX ADMIN — ONDANSETRON 4 MG: 2 INJECTION INTRAMUSCULAR; INTRAVENOUS at 06:25

## 2019-03-03 RX ADMIN — DEXAMETHASONE SODIUM PHOSPHATE 4 MG: 4 INJECTION, SOLUTION INTRA-ARTICULAR; INTRALESIONAL; INTRAMUSCULAR; INTRAVENOUS; SOFT TISSUE at 15:10

## 2019-03-03 RX ADMIN — Medication 3 G: at 15:00

## 2019-03-03 RX ADMIN — FENTANYL CITRATE 50 MCG: 50 INJECTION, SOLUTION INTRAMUSCULAR; INTRAVENOUS at 17:57

## 2019-03-03 RX ADMIN — GLYCOPYRROLATE 0.2 MG: 0.2 INJECTION, SOLUTION INTRAMUSCULAR; INTRAVENOUS at 15:10

## 2019-03-03 RX ADMIN — FENTANYL CITRATE 50 MCG: 50 INJECTION, SOLUTION INTRAMUSCULAR; INTRAVENOUS at 17:44

## 2019-03-03 RX ADMIN — LIDOCAINE HYDROCHLORIDE 30 MG: 10 INJECTION, SOLUTION INFILTRATION; PERINEURAL at 15:10

## 2019-03-03 RX ADMIN — Medication 0.5 MG: at 16:56

## 2019-03-03 RX ADMIN — Medication 10 MEQ: at 11:15

## 2019-03-03 RX ADMIN — Medication 0.5 MG: at 17:20

## 2019-03-03 RX ADMIN — PROPOFOL 300 MG: 10 INJECTION, EMULSION INTRAVENOUS at 15:10

## 2019-03-03 RX ADMIN — SODIUM CHLORIDE, POTASSIUM CHLORIDE, SODIUM LACTATE AND CALCIUM CHLORIDE: 600; 310; 30; 20 INJECTION, SOLUTION INTRAVENOUS at 15:25

## 2019-03-03 RX ADMIN — Medication 0.5 MG: at 06:26

## 2019-03-03 RX ADMIN — METOPROLOL TARTRATE 1 MG: 5 INJECTION, SOLUTION INTRAVENOUS at 16:39

## 2019-03-03 RX ADMIN — ROCURONIUM BROMIDE 50 MG: 10 INJECTION INTRAVENOUS at 15:10

## 2019-03-03 RX ADMIN — Medication 0.5 MG: at 10:08

## 2019-03-03 RX ADMIN — Medication 10 MEQ: at 13:35

## 2019-03-03 RX ADMIN — ONDANSETRON 4 MG: 2 INJECTION INTRAMUSCULAR; INTRAVENOUS at 16:09

## 2019-03-03 RX ADMIN — FENTANYL CITRATE 50 MCG: 50 INJECTION, SOLUTION INTRAMUSCULAR; INTRAVENOUS at 14:19

## 2019-03-03 RX ADMIN — Medication 10 MEQ: at 12:23

## 2019-03-03 RX ADMIN — Medication 10 MEQ: at 18:03

## 2019-03-03 RX ADMIN — METOPROLOL TARTRATE 1 MG: 5 INJECTION, SOLUTION INTRAVENOUS at 17:01

## 2019-03-03 RX ADMIN — POTASSIUM CHLORIDE, DEXTROSE MONOHYDRATE AND SODIUM CHLORIDE: 150; 5; 450 INJECTION, SOLUTION INTRAVENOUS at 21:45

## 2019-03-03 RX ADMIN — Medication 0.5 MG: at 12:37

## 2019-03-03 RX ADMIN — Medication 0.5 MG: at 07:49

## 2019-03-03 RX ADMIN — Medication 5 MG: at 16:09

## 2019-03-03 RX ADMIN — GLYCOPYRROLATE 0.8 MG: 0.2 INJECTION, SOLUTION INTRAMUSCULAR; INTRAVENOUS at 16:09

## 2019-03-03 RX ADMIN — METOPROLOL SUCCINATE 200 MG: 100 TABLET, EXTENDED RELEASE ORAL at 10:06

## 2019-03-03 ASSESSMENT — ENCOUNTER SYMPTOMS
SLEEP DISTURBANCE: 1
ABDOMINAL PAIN: 1
APPETITE CHANGE: 1
DIARRHEA: 1
FEVER: 1

## 2019-03-03 ASSESSMENT — MIFFLIN-ST. JEOR
SCORE: 2701.67
SCORE: 2703.94
SCORE: 2701.67

## 2019-03-03 NOTE — H&P
[facility] Outpatient / Observation Unit  History and Physical Exam     Keo Pinto MRN# [id number]   YOB: 1985 Age: 33 year old      Date of Admission:  3/3/2019    Primary care provider: Tian Perez          Assessment:   Keo Pinto is a 33 year old male with a PMH significant for gallstone pancreatitis s/p EUS & ERCP (2/28/19), cholelithiasis, HTN, who presents with complaints of acute epigastric and left upper quadrant pain with associated nausea and has RUQ US findings consistent with acute cholecystitis.    Work up in the ED reveals: In the ER, HR98.6 F, P 103, /117, RR 16, SpO2 96% on room air.  CBC w/ diff demonstrated normal white count of 10.8, stable hgb at 13.0, and plt 181. CMP revealed hypokalemia of 3.1 and improving transaminases with  (from 252 yesterday) and AST 28. Lipase 101. Lactic acid 1.4. RUQ US showed cholelithiasis, gallbladder distention, gallbladder wall thickening, and a small amount of pericholecystic fluid suggestive of acute cholecystitis; also showed mild diffuse fatty infiltration of the liver; pancreas poorly visualized.    Patient was registered to observation for acute cholecystitis +/- possible recurrent pancreatitis.    1. Upper abdominal pain, acute cholecystitis: Patient underwent EUS with ERCP on 2/28/19 with stone removal. Was seen by surgical service and plan was for outpatient cholecystectomy since he as able to advance his diet after ERCP without worsening pain. Returned after discharge to ER with recurrent upper abdominal pain. Patient has soreness over the RUQ but is significantly more tender over epigastrium and LUQ. No leukocytosis or documented fever (though patient reported temp of 100 F at home last evening). RUQ US today consistent with acute cholecystitis. General surgery recommends laparoscopic cholecystectomy since he has returned to the ER with pain.  -NPO w/ IVF, supportive cares with IV analgesics and prn  antiemetics  -General surgery consult, plan for OR this afternoon  -If feeling great in PACU, could discharge home later tonight, but if ongoing pain will stay overnight on observation; given location of pain (epigastric and LUQ) there is concern there may be an element of recurrent pancreatitis. Consider CT imaging if ongoing upper abdominal pain tomorrow AM  -Follow CMP and CBC w/ diff  -Hold on further abx unless documented fever, leukocytosis, or indicated by surgery service    2. Hypertension: Continue PTA metoprolol 200 mg daily and lisinopril 10 mg daily with hold parameters.    3. GERD: Continue pantoprazole 40 mg daily         Plan:     1. [place] to Observation  2. IV hydration with Lactated Ringer's, @, 125 ml/hr  3. Cont supportive care with anti-emetics and pain control   4. General Surgery consult for timing of cholecystectomy (if appropriate)  5. Initiate antibiotics, if indicated  6. Follow CBC, BMP, LFTs  7. NPO  8. DVT prophylaxis: pt at low risk, encourage ambulation                Chief Complaint:   Nausea, vomiting and upper abdominal pain         History of Present Illness:   Keo Pinto is a 33 year old male who presents with c/o nausea, vomiting and RUQ pain.     Mr. Pinto recently was admitted for choledocholithiasis. He had an EUS and ERCP with CBD stone removal on 2/28/19. He was seen by the surgical service and they recommended arranging an outpatient lap cholecystectomy. He advanced his diet with minimal pain (rated a dull 3/10), so he discharged home yesterday (3/2/10). When he got home, he reports he ate lightly, only eating a yogurt, and then his upper abdominal pain acutely worsened. He states that the pain is mainly in the epigastrium and LUQ with some dull pain in the RUQ as well. He reported nausea but no vomiting. He states his temp was around 100 F at home, so he took a tylenol and his temp came back down. His pain continued to worsen throughout the evening, so he returned  to the ER. He reported that he has been having loose stools after taking Miralax started in the hospital. The diarrhea has slowed but is continuing. He denies recent travel or ill contacts. No chills, sob, cough, chest pain, or vomiting. He reported having some mild burning with urination during his prior hospitalization, but it has since resolved. He denies gross hematuria or trouble with voiding.    Work up in the ED reveals: In the ER, HR98.6 F, P 103, /117, RR 16, SpO2 96% on room air.  CBC w/ diff demonstrated normal white count of 10.8, stable hgb at 13.0, and plt 181. CMP revealed hypokalemia of 3.1 and improving transaminases with  (from 252 yesterday) and AST 28. Lipase 101. Lactic acid 1.4. RUQ US showed cholelithiasis, gallbladder distention, gallbladder wall thickening, and a small amount of pericholecystic fluid suggestive of acute cholecystitis; also showed mild diffuse fatty infiltration of the liver; pancreas poorly visualized.              Past Medical History:     Past Medical History:   Diagnosis Date     Esophageal reflux      Hypertension      Migraine headache           Past Surgical History:     Past Surgical History:   Procedure Laterality Date     ADENOIDECTOMY       ENDOSCOPIC RETROGRADE CHOLANGIOPANCREATOGRAM N/A 2/28/2019    Procedure: ENDOSCOPIC RETROGRADE CHOLANGIOPANCREATOGRAM with Sphincterotomy and Stone Extraction.;  Surgeon: Amina Hernandez MD;  Location: [facility] OR     ESOPHAGOSCOPY, GASTROSCOPY, DUODENOSCOPY (EGD), COMBINED N/A 2/28/2019    Procedure: COMBINED ENDOSCOPIC ULTRASOUND, Endoscopic Retrograde Cholangiopancreatoscopy;  Surgeon: Amina Hernandez MD;  Location: [facility] OR     PE TUBES      As a young child          Social History:     Social History     Socioeconomic History     Marital status: Single     Spouse name: Not on file     Number of children: Not on file     Years of education: Not on file     Highest education level: Not on file    Occupational History     Employer: STUDENT     Comment: [employer]   Social Needs     Financial resource strain: Not on file     Food insecurity:     Worry: Not on file     Inability: Not on file     Transportation needs:     Medical: Not on file     Non-medical: Not on file   Tobacco Use     Smoking status: Never Smoker     Smokeless tobacco: Never Used   Substance and Sexual Activity     Alcohol use: Yes     Comment: Rare     Drug use: No     Sexual activity: Yes     Partners: Female     Birth control/protection: Condom, IUD   Lifestyle     Physical activity:     Days per week: Not on file     Minutes per session: Not on file     Stress: Not on file   Relationships     Social connections:     Talks on phone: Not on file     Gets together: Not on file     Attends Caoda[employer] service: Not on file     Active member of club or organization: Not on file     Attends meetings of clubs or organizations: Not on file     Relationship status: Not on file     Intimate partner violence:     Fear of current or ex partner: Not on file     Emotionally abused: Not on file     Physically abused: Not on file     Forced sexual activity: Not on file   Other Topics Concern     Parent/sibling w/ CABG, MI or angioplasty before 65F 55M? No   Social History Narrative     Not on file          Family History:     Family History   Problem Relation Age of Onset     Neurologic Disorder Mother         Parkinsons, Migraine HA's     Hypertension Father      Alcohol/Drug Father      Heart Disease Maternal Grandfather         MI age 52     Heart Disease Paternal Grandfather         MI age 70's          Allergies:    No Known Allergies           Medications:     Prior to Admission medications    Medication Sig Last Dose Taking? Auth Provider   acetaminophen (TYLENOL) 500 MG tablet Take 500-1,000 mg by mouth every 6 hours as needed for mild pain Max dose of 3000mg 3/3/2019 at 0200 Yes Unknown, Entered By History   Lidocaine-Menthol 4-4 % PTC  Externally apply topically daily as needed  at prn Yes Unknown, Entered By History   lisinopril (PRINIVIL/ZESTRIL) 10 MG tablet Take 1 tablet (10 mg) by mouth daily Past Week at Unknown time Yes Tian Perez MD   metoprolol succinate ER (TOPROL-XL) 200 MG 24 hr tablet Take 1 tablet (200 mg) by mouth daily Past Week at (received 100 mg metoprolol tartrate on 3/2) Yes Tian Perez MD   pantoprazole (PROTONIX) 40 MG EC tablet Take 1 tablet (40 mg) by mouth daily 3/2/2019 at Unknown time Yes Irvin Jefferson MD              Review of Systems:   A Comprehensive greater than 10 system review of systems was carried out.  Pertinent positives and negatives are noted above.  Otherwise negative for contributory information.          Physical Exam:   Blood pressure 167/89, pulse 100, temperature 99.2  F (37.3  C), temperature source Oral, resp. rate 16, height 1.829 m (6'), weight (!) 172.4 kg (380 lb), SpO2 96 %.    GENERAL: healthy, alert, mild distress, non-toxic appearing  EYES: Eyes grossly normal to inspection, extraocular movements - intact, and PERRL  HENT: Nose- normal; Mouth- no ulcers, no lesions.   ORAL MUCOSA: within normal limits, no visible lesions  NECK: no tenderness, no adenopathy, no asymmetry, no masses, no stiffness; thyroid- normal to palpation  RESP: lungs clear to auscultation - no rales, no rhonchi, no wheezes  CV: regular rates and rhythm, normal S1 S2, no S3 or S4 and no murmur, no click or rub ABDOMEN: soft, nontender, without hepatosplenomegaly or masses  BACK: No CVA tenderness. No paralumbar tenderness  MS: extremities- no gross deformities noted, no edema  SKIN: no suspicious lesions, no rashes  NEURO: strength and tone- normal, sensory exam- grossly normal, mentation- intact, speech- normal, reflexes- symmetric  PSYCH: Alert and oriented times 3. Affect is normal.            Data:     Results for orders placed or performed during the hospital encounter of 03/03/19   Abdomen US, limited  (RUQ only)    Narrative    US ABDOMEN LIMITED   3/3/2019 7:34 AM     HISTORY: Abdominal pain after ERCP.    COMPARISON: 2/28/2019.    FINDINGS: Mild diffuse fatty infiltration of the liver. No focal  hepatic lesions are identified. Multiple gallstones are noted within  the gallbladder. The gallbladder wall appears thickened. Gallbladder  distention has increased slightly since the previous exam. There is a  small amount of pericholecystic fluid. The sonographer reports a  negative sonographic Walter's sign. No intra or extrahepatic bile duct  dilatation. The common duct could not be visualized in its entirety.  Limited evaluation of the right kidney is unremarkable. Pancreas is  obscured by overlying bowel gas, and could not be evaluated. The exam  was limited related to patient body habitus.      Impression    IMPRESSION:   1. Cholelithiasis, gallbladder distention, gallbladder wall  thickening, and a small amount of pericholecystic fluid again suggest  acute cholecystitis. Please clinically correlate.  2. Mild diffuse fatty infiltration of the liver.  3. Nonvisualization of the pancreas.     CHUN GUY MD   CBC with platelets differential   Result Value Ref Range    WBC 10.8 4.0 - 11.0 10e9/L    RBC Count 4.33 (L) 4.4 - 5.9 10e12/L    Hemoglobin 13.0 (L) 13.3 - 17.7 g/dL    Hematocrit 38.6 (L) 40.0 - 53.0 %    MCV 89 78 - 100 fl    MCH 30.0 26.5 - 33.0 pg    MCHC 33.7 31.5 - 36.5 g/dL    RDW 12.4 10.0 - 15.0 %    Platelet Count 181 150 - 450 10e9/L    Diff Method Automated Method     % Neutrophils 78.7 %    % Lymphocytes 10.8 %    % Monocytes 8.1 %    % Eosinophils 1.5 %    % Basophils 0.2 %    % Immature Granulocytes 0.7 %    Nucleated RBCs 0 0 /100    Absolute Neutrophil 8.5 (H) 1.6 - 8.3 10e9/L    Absolute Lymphocytes 1.2 0.8 - 5.3 10e9/L    Absolute Monocytes 0.9 0.0 - 1.3 10e9/L    Absolute Eosinophils 0.2 0.0 - 0.7 10e9/L    Absolute Basophils 0.0 0.0 - 0.2 10e9/L    Abs Immature Granulocytes 0.1 0 -  0.4 10e9/L    Absolute Nucleated RBC 0.0    Comprehensive metabolic panel   Result Value Ref Range    Sodium 137 133 - 144 mmol/L    Potassium 3.1 (L) 3.4 - 5.3 mmol/L    Chloride 103 94 - 109 mmol/L    Carbon Dioxide 26 20 - 32 mmol/L    Anion Gap 8 3 - 14 mmol/L    Glucose 93 70 - 99 mg/dL    Urea Nitrogen 5 (L) 7 - 30 mg/dL    Creatinine 0.66 0.66 - 1.25 mg/dL    GFR Estimate >90 >60 mL/min/[1.73_m2]    GFR Estimate If Black >90 >60 mL/min/[1.73_m2]    Calcium 8.1 (L) 8.5 - 10.1 mg/dL    Bilirubin Total 1.1 0.2 - 1.3 mg/dL    Albumin 2.4 (L) 3.4 - 5.0 g/dL    Protein Total 6.7 (L) 6.8 - 8.8 g/dL    Alkaline Phosphatase 83 40 - 150 U/L     (H) 0 - 70 U/L    AST 28 0 - 45 U/L   Lipase   Result Value Ref Range    Lipase 101 73 - 393 U/L   Glucose by meter   Result Value Ref Range    Glucose 92 70 - 99 mg/dL   ISTAT gases lactate damari POCT   Result Value Ref Range    Ph Venous 7.47 (H) 7.32 - 7.43 pH    PCO2 Venous 33 (L) 40 - 50 mm Hg    PO2 Venous 113 (H) 25 - 47 mm Hg    Bicarbonate Venous 24 21 - 28 mmol/L    O2 Sat Venous 99 %    Lactic Acid 1.4 0.7 - 2.1 mmol/L       Cheyanne Carias PA-C

## 2019-03-03 NOTE — ED PROVIDER NOTES
History     Chief Complaint:  Abdominal Pain    HPI   Keo Pinto is a 33 year old male with a history of gallstone pancreatitis and hypertension,  who presents with abdominal pain. The patient underwent an ERCP on 2/28/19. He reports that he felt fine after the surgery, however yesterday he started to experience all day abdominal pain and discomfort that started at a 3/10 on the pain scale. He states that he took a Tylenol last night at 2000 when he went to bed and had a fever of 100.0. When the patient woke up at 0200, his fever had broken, but his abdominal pain was a 7/10 on the pain scale. He was unable to go back to sleep due to his pain. The patient notes that he experienced watery diarrhea 4-5x a day during his last two days in the hospital after his surgery. In the last 12 hours, he has not had as many episodes of diarrhea, but is still experiencing a couple of episodes. He has not been able to eat very much lately due to a decrease in appetite.     Allergies:  No known drug allergies     Medications:    Tylenol  Prinivil/Zestril  Toprol  Lidocaine-Menthol Patch  Protonix    Past Medical History:    Esophageal Reflux  Hypertension  Migraine headache  Gallstone Pancreatitis  Anxiety  Insomnia    Past Surgical History:    Adenoidectomy  Endoscopic Retrograde Cholangiopancreatogram  EGD, combined  PE tubes    Family History:    Mother: Parkinson's   Father: Hypertension, Alcohol/Drug abuse    Social History:  Smoking Status: Never Smoker  Alcohol Use: Yes  Patient presents alone.  Marital Status:  Single     Review of Systems   Constitutional: Positive for appetite change and fever.   Gastrointestinal: Positive for abdominal pain and diarrhea.   Psychiatric/Behavioral: Positive for sleep disturbance.   All other systems reviewed and are negative.        Physical Exam   First Vitals:  BP: (!) 184/117  Pulse: 103  Heart Rate: 103  Temp: 98.6  F (37  C)  Resp: 16  Height: 182.9 cm (6')  Weight: (!) 172.4 kg  (380 lb)  SpO2: 96 %      Physical Exam  General: Patient is alert and interactive when I enter the room.  Appears in uncomfortable.  Distress with the pain.   Head:  The scalp, face, and head appear normal  Eyes:  The pupils are equal, round, and reactive to light    Conjunctivae and sclerae are normal  ENT:    External acoustic canals are normal    The oropharynx is normal without erythema.     Uvula is in the midline  Neck:  Normal range of motion  CV:  Regular rate. S1/S2. No murmurs.   Resp:  Lungs are clear without wheezes or rales. No distress  GI:    Abdomen is soft, no rigidity.    No distension.     He is tender to palpation in the right upper quadrant.     There is no rebound or guarding.     Bowel sounds are normal.       No hernias or bruising are noted in detailed exam.     No CVA tenderness.    MS:  Normal tone. Joints grossly normal without effusions.     No asymmetric leg swelling, calf or thigh tenderness.      Normal motor assessment of all extremities.  Skin:  No rash or lesions noted. Normal capillary refill noted  Neuro:  Speech is normal and fluent. Face is symmetric.     Moving all extremities well.   Psych: Awake. Alert.  Normal affect.  Appropriate interactions.  Lymph: No anterior cervical lymphadenopathy noted    Emergency Department Course     Imaging:  Radiographic findings were communicated with the patient who voiced understanding of the findings.  Abdomen US, limited (RUQ only)  1. Cholelithiasis, gallbladder distention, gallbladder wall  thickening, and a small amount of pericholecystic fluid again suggest  acute cholecystitis. Please clinically correlate.  2. Mild diffuse fatty infiltration of the liver.  3. Nonvisualization of the pancreas.   As read by Radiology.    Laboratory:  CBC: HGB 13.0 (L) o/w WNL (WBC 10.8, )  CMP:  Potassium 3.1 (L), BUN 5 (L), Calcium 8.1 (L), Albumin 2.4 (L), Protein Total  6.7 (L),  (H) o/w WNL (Creatinine 0.66)  Lipase: 101  ISTAT gases  lactate damari POCT: pH venous 7.47 (H), pCO2 33 (L), pO2 113 (H) o/w WNL    Interventions:  0625 NS 1L IV Bolus  0625 Zofran 4 mg IV    Emergency Department Course:  Past medical records, nursing notes, and vitals reviewed.  0615: I performed an exam of the patient and obtained history, as documented above.    The patient was sent for a ultrasound while in the emergency department, findings above.    IV inserted and blood drawn.    0752: I discussed the case with Cheyanne Carias for Dr. Andrews  regarding the patient.    0806: I discussed the case with Dr. Crowder regarding the patient.    Findings and plan explained to the Patient who consents to admission.     Discussed the patient with Dr. Andrews, who will admit the patient to a hospital bed for further monitoring, evaluation, and treatment.     Impression & Plan      Medical Decision Making:  Keo Pinto is a 33 year old male who presents with abdominal pain.  The workup in the Emergency Room is concerning for acute cholecystitis.  Antibiotics have been started and the patient will be admitted to the medicine service for further evaluation and treatment with a likely surgical consultation.  His enzymes are much improved as is his WBC which is reassuring, but does not really change the fact that he has now worsening pain, unable to sleep, and continued concerning gallbladder ultrasound.      There is no evidence at this point of serious complications of cholecystitis such as gangrenous cholecystitis, septic shock, etc.  No signs of other complications such as CBD stone, ascending cholangitis, gallstone pancreatitis.  Given constellation of symptoms, lab data and ultrasound I doubt GERD, gastritis, PUD, perforated ulcer, diverticulitis, colitis. Has had some diarrhea but this is lessening and received miralax in hospital; doubt c diff colititis at this point.         Diagnosis:    ICD-10-CM    1. RUQ abdominal pain R10.11    2. Acute cholecystitis K81.0         Disposition:  Admitted to hospital    Discharge Medications:     Medication List      There are no discharge medications for this visit.         Val Schrader  3/3/2019   [facility] EMERGENCY DEPARTMENT  I, Val Schrader, am serving as a scribe at 6:15 AM on 3/3/2019 to document services personally performed by Carlos Parker MD based on my observations and the provider's statements to me.        Carlos Parker MD  03/03/19 0813

## 2019-03-03 NOTE — OP NOTE
General Surgery Operative Note    Pre-operative diagnosis:  Acute cholecystitis and gallstone pancreatitis, BMI greater than 50   Post-operative diagnosis:  Same   Procedure: Laparoscopic Cholecystectomy   Surgeon: Leland Crowder MD   Assistant(s): Heather Mcknight PA-C - the physician assistant was medically necessary to assist in prepping, positioning, camera operation, retraction/exposure and closure of the port site.    Anesthesia: General    Estimated blood loss: 5 cc's   Drains placed: None   Complications:  None   Findings:   Inflamed gallbladder packed with stones.  No other obvious abnormalities were noted, though evaluation of the left upper quadrant was limited by body habitus.     INDICATIONS FOR OPERATION: This is a patient with recent gallstone pancreatitis who presents with recurrent left upper quadrant pain.  Ultrasound revealed gallbladder wall thickening and pericholecystic fluid.  Laparoscopic cholecystectomy was recommended.  The procedure along with its risks and complications was discussed in detail and the patient agreed to proceed.  We specifically discussed that there was a possibility that the patient's pain was from pancreatitis rather than his gallbladder, and that if that were the case, cholecystectomy and might not resolve his symptoms.  Nonetheless, the plan was to perform eventual cholecystectomy in any case.  The patient expresses understanding.    DETAILS OF THE OPERATION: After informed consent the patient was taken to the operating room where he underwent satisfactory induction of general anesthesia.  The patient was then sterilely prepped and draped.  A supraumbilical skin incision was made using a skin knife.  The dissection was carried bluntly down to the fascia.  The fascia was opened using electrocautery and the Nash trocar was then inserted.  Pneumoperitoneum was achieved using CO2 insufflation, and under direct visualization  three  5 mm upper abdominal ports were  placed.  The gallbladder was visualized and was grasped. It was pulled up over the liver and the cystic duct was exposed.  The cystic duct was skeletonized, triple clipped and divided.  The cystic artery was likewise triple clipped and divided.  The gallbladder was then dissected away from the liver using electrocautery.  The gallbladder was then placed in an Endo Catch bag and removed through the supraumbilical incision.  The gallbladder fossa was irrigated out.  There was excellent hemostasis and the clips were in good position.  The trocar sites were now infiltrated with half percent Marcaine with epinephrine.  The trochars were removed under direct visualization.  The supraumbilical fascia was then closed using 0 Vicryl suture.  Skin incisions were closed using 4-0 subcuticular Vicryl followed by Steri-Strips.    Difficulty of the case was significantly increased by the patient's BMI of greater than 50.    The patient was transferred to the recovery room in satisfactory condition.  Sponge and needle counts were correct at the close of the case.      Specimens:   ID Type Source Tests Collected by Time Destination   A : gallbladder and contents Tissue Gallbladder and Contents SURGICAL PATHOLOGY EXAM Leland Crowder MD 3/3/2019  3:55 PM            Leland Crowder MD

## 2019-03-03 NOTE — PHARMACY-ADMISSION MEDICATION HISTORY
Admission medication history interview status for this patient is complete. See [facility] admission navigator for allergy information, prior to admission medications and immunization status.     Medication history interview source(s):Patient  Medication history resources (including written lists, pill bottles, clinic record):None    Changes made to PTA medication list:  Added: none  Deleted: none  Changed: none    Actions taken by pharmacist (provider contacted, etc):None     Additional medication history information:Patient discharged on 3/2/19 and was getting metoprolol tartrate 100 mg BID while in the hospital instead of metoprolol succinate 200 mg which is the PTA med. The pt received metoprolol tartrate 100 mg in the morning on 3/2 and was instructed to not take PTA Toprol XL until today, 3/3/19 but he did not take it prior to arriving in the ED.     Medication reconciliation/reorder completed by provider prior to medication history? No    Do you take OTC medications (eg tylenol, ibuprofen, fish oil, eye/ear drops, etc)? Y(Y/N)    For patients on insulin therapy: N (Y/N)    Time spent in this activity: 5 min    Prior to Admission medications    Medication Sig Last Dose Taking? Auth Provider   acetaminophen (TYLENOL) 500 MG tablet Take 500-1,000 mg by mouth every 6 hours as needed for mild pain Max dose of 3000mg 3/3/2019 at 0200 Yes Unknown, Entered By History   Lidocaine-Menthol 4-4 % PTCH Externally apply topically daily as needed  at prn Yes Unknown, Entered By History   lisinopril (PRINIVIL/ZESTRIL) 10 MG tablet Take 1 tablet (10 mg) by mouth daily Past Week at Unknown time Yes Tian Perez MD   metoprolol succinate ER (TOPROL-XL) 200 MG 24 hr tablet Take 1 tablet (200 mg) by mouth daily Past Week at (received 100 mg metoprolol tartrate on 3/2) Yes Tian Perez MD   pantoprazole (PROTONIX) 40 MG EC tablet Take 1 tablet (40 mg) by mouth daily 3/2/2019 at Unknown time Yes Irvin Jefferson MD

## 2019-03-03 NOTE — ANESTHESIA POSTPROCEDURE EVALUATION
Patient: Keo Pinto    Procedure(s):  LAPAROSCOPIC CHOLECYSTECTOMY    Diagnosis:unknown  Diagnosis Additional Information: Acute cholecystitis and gallstone pancreatitis, BMI greater than 50    Anesthesia Type:  General, ETT    Note:  Anesthesia Post Evaluation    Patient location during evaluation: PACU  Patient participation: Able to fully participate in evaluation  Level of consciousness: awake  Pain management: adequate  Airway patency: patent  Cardiovascular status: acceptable  Respiratory status: acceptable  Hydration status: acceptable  PONV: controlled     Anesthetic complications: None          Last vitals:  Vitals:    03/03/19 1630 03/03/19 1645 03/03/19 1700   BP: (!) 146/91 133/88 136/84   Pulse: 103 96 100   Resp: 20 16 19   Temp:      SpO2: 100% 98% 92%         Electronically Signed By: Paul Cunningham DO  March 3, 2019  5:14 PM

## 2019-03-03 NOTE — ANESTHESIA CARE TRANSFER NOTE
Patient: Keo Pinto    Procedure(s):  LAPAROSCOPIC CHOLECYSTECTOMY    Diagnosis: unknown  Diagnosis Additional Information: No value filed.    Anesthesia Type:   General, ETT     Note:  Airway :Face Mask  Patient transferred to:PACU  Comments: Jaylon Report: Identifed the Patient, Identified the Reponsible Provider, Reviewed the pertinent medical history, Discussed the surgical course, Reviewed Intra-OP anesthesia mangement and issues during anesthesia, Set expectations for post-procedure period and Allowed opportunity for questions and acknowledgement of understanding      Vitals: (Last set prior to Anesthesia Care Transfer)    CRNA VITALS  3/3/2019 1549 - 3/3/2019 1626      3/3/2019             Pulse:  119    SpO2:  98 %    Resp Rate (observed):  15                Electronically Signed By: MARY Hill CRNA  March 3, 2019  4:26 PM

## 2019-03-03 NOTE — ED NOTES
[facility]  ED Nurse Handoff Report    Keo Pinto is a 33 year old male   ED Chief complaint: Abdominal Pain  . ED Diagnosis:   Final diagnoses:   RUQ abdominal pain   Acute cholecystitis     Allergies: No Known Allergies    Code Status: Full Code  Activity level - Baseline/Home:  Independent. Activity Level - Current:   Independent. Lift room needed: No. Bariatric: No   Needed: No   Isolation: No. Infection: Not Applicable.     Vital Signs:   Vitals:    03/03/19 0544 03/03/19 0730 03/03/19 0745 03/03/19 0800   BP: (!) 184/117 (!) 186/98  (!) 180/96   Pulse: 103 96     Resp: 16      Temp: 98.6  F (37  C)      TempSrc: Oral      SpO2: 96% 98% 96% 95%   Weight: (!) 172.4 kg (380 lb)      Height: 1.829 m (6')          Cardiac Rhythm:  ,      Pain level: 0-10 Pain Scale: 8  Patient confused: No. Patient Falls Risk: No.   Elimination Status: Has voided   Patient Report - Initial Complaint:Was admitted for pancreatitis and gallstones and discharge yesterday with tylenol for pain control. Last dose of tylenol at 2am. Pain exacerbates throughout the night since 2am. Fever, chills, nausea.. Focused Assessment: complains of pain that worsens with palpation on the left upper abdomen.    Tests Performed:   Labs Ordered and Resulted from Time of ED Arrival Up to the Time of Departure from the ED   CBC WITH PLATELETS DIFFERENTIAL - Abnormal; Notable for the following components:       Result Value    RBC Count 4.33 (*)     Hemoglobin 13.0 (*)     Hematocrit 38.6 (*)     Absolute Neutrophil 8.5 (*)     All other components within normal limits   COMPREHENSIVE METABOLIC PANEL - Abnormal; Notable for the following components:    Potassium 3.1 (*)     Urea Nitrogen 5 (*)     Calcium 8.1 (*)     Albumin 2.4 (*)     Protein Total 6.7 (*)      (*)     All other components within normal limits   ISTAT  GASES LACTATE JAYDA POCT - Abnormal; Notable for the following components:    Ph Venous 7.47 (*)      PCO2 Venous 33 (*)     PO2 Venous 113 (*)     All other components within normal limits   LIPASE   ISTAT CG4 GASES LACTATE JAYDA NURSING POCT   NURSING DRAW AND HOLD   . Abnormal Results: asabove.   Treatments provided: IV fluids, pain meds, abx,  Family Comments: father at bedside  OBS brochure/video discussed/provided to patient:  Yes  ED Medications:   Medications   HYDROmorphone (PF) (DILAUDID) injection 0.5 mg (0.5 mg Intravenous Given 3/3/19 0749)   lactated ringers BOLUS 500 mL (500 mLs Intravenous New Bag 3/3/19 0625)   ondansetron (ZOFRAN) injection 4 mg (4 mg Intravenous Given 3/3/19 0625)   cefOXitin (MEFOXIN) 2 g vial to attach to  mL bag (2 g Intravenous New Bag 3/3/19 0749)     Drips infusing:  No  For the majority of the shift, the patient's behavior Green. Interventions performed were none.     Severe Sepsis OR Septic Shock Diagnosis Present: No      ED Nurse Name/Phone Number: Nikia Werner,   8:21 AM    RECEIVING UNIT ED HANDOFF REVIEW    Above ED Nurse Handoff Report was reviewed: Yes  Reviewed by: Lily Leroy on March 3, 2019 at 8:58 AM

## 2019-03-03 NOTE — ED TRIAGE NOTES
Was admitted for pancreatitis and gallstones and discharge yesterday with tylenol for pain control. Last dose of tylenol at 2am. Pain exacerbates throughout the night since 2am. Fever, chills, nausea. ABCs intact.

## 2019-03-03 NOTE — CONSULTS
Chief complaint:  Abdominal pain, left upper quadrant    HPI:  This patient is a 33 year old male who presents with after recent ERCP for common duct stone.  The patient was recently admitted with gallstone pancreatitis and had been seen by Dr. Lockett.  The plan was to perform laparoscopic cholecystectomy a couple of weeks out to allow the pancreatitis to resolve.  The patient was discharged yesterday, and had worsening of left upper quadrant pain which brought him back into the emergency room today.  Ultrasound shows gallbladder wall thickening concerning for cholecystitis.    Past Medical History:   has a past medical history of Esophageal reflux, Hypertension, and Migraine headache.    Past Surgical History:  Past Surgical History:   Procedure Laterality Date     ADENOIDECTOMY       ENDOSCOPIC RETROGRADE CHOLANGIOPANCREATOGRAM N/A 2/28/2019    Procedure: ENDOSCOPIC RETROGRADE CHOLANGIOPANCREATOGRAM with Sphincterotomy and Stone Extraction.;  Surgeon: Amina Hernandez MD;  Location: [facility] OR     ESOPHAGOSCOPY, GASTROSCOPY, DUODENOSCOPY (EGD), COMBINED N/A 2/28/2019    Procedure: COMBINED ENDOSCOPIC ULTRASOUND, Endoscopic Retrograde Cholangiopancreatoscopy;  Surgeon: Amina Hernandez MD;  Location: [facility] OR     PE TUBES      As a young child        Social History:  Social History     Socioeconomic History     Marital status: Single     Spouse name: Not on file     Number of children: Not on file     Years of education: Not on file     Highest education level: Not on file   Occupational History     Employer: STUDENT     Comment: [place]   Social Needs     Financial resource strain: Not on file     Food insecurity:     Worry: Not on file     Inability: Not on file     Transportation needs:     Medical: Not on file     Non-medical: Not on file   Tobacco Use     Smoking status: Never Smoker     Smokeless tobacco: Never Used   Substance and Sexual Activity     Alcohol use: Yes     Comment: Rare     Drug  use: No     Sexual activity: Yes     Partners: Female     Birth control/protection: Condom, IUD   Lifestyle     Physical activity:     Days per week: Not on file     Minutes per session: Not on file     Stress: Not on file   Relationships     Social connections:     Talks on phone: Not on file     Gets together: Not on file     Attends Adventism service: Not on file     Active member of club or organization: Not on file     Attends meetings of clubs or organizations: Not on file     Relationship status: Not on file     Intimate partner violence:     Fear of current or ex partner: Not on file     Emotionally abused: Not on file     Physically abused: Not on file     Forced sexual activity: Not on file   Other Topics Concern     Parent/sibling w/ CABG, MI or angioplasty before 65F 55M? No   Social History Narrative     Not on file        Family History:  Family History   Problem Relation Age of Onset     Neurologic Disorder Mother         Parkinsons, Migraine HA's     Hypertension Father      Alcohol/Drug Father      Heart Disease Maternal Grandfather         MI age 52     Heart Disease Paternal Grandfather         MI age 70's       Review of Systems:  The 10 point Review of Systems is negative other than noted in the HPI and above.    Physical Exam:  General - This is a well developed, well nourished male in no apparent distress.  HEENT - Normocephalic, atraumatic.  No scleral icterus.  Neck - supple without masses  Lungs - clear to ascultation.    Heart - Regular rate & rhythm without murmur  Abdomen:   soft, non-distended with tenderness noted in the left upper quadrant. no masses palpated. normal bowel sounds.  Extremities - warm without edema  Neurologic - nonfocal    Relevant labs:  Total bilirubin 1.1, alkaline phosphatase 83, , AST 28, lipase 101.    Imaging:  Gallbladder wall thickening.  Gallstones.    Assessment and Plan:  This is a patient with recurrent pain which may be either his gallbladder or  his pancreatitis.  Given his recent return, however, I think we should go ahead and perform laparoscopic cholecystectomy today.  We discussed the procedure, along with its risks and complications, in detail.  The patient has agreed to proceed.  He understands that cholecystectomy will not alter the course of his pancreatitis, if that is the cause of his pain.      Leland Crowder MD  [facility]

## 2019-03-03 NOTE — DISCHARGE INSTRUCTIONS
HOME CARE FOLLOWING LAPAROSCOPIC CHOLECYSTECTOMY  VASYL Rosenberg, CHANI Carr      IINCISIONAL CARE:  Replace the bandage over your incisions until all drainage stops, or if more comfortable to have in place.  If present, leave the steri-strips (white paper tapes) in place for 14 days after surgery.  If Dermabond (a type of skin glue) is present, leave in place until it wears/flakes off.   BATHING:  Avoid baths for 1 week after surgery.  Showers are okay.  You may wash your hair at any time.  Gently pat your incisions dry after bathing.  ACTIVITY:  Light Activity -- you may immediately be up and about as tolerated.  Driving -- you may drive when comfortable and off narcotic pain medications.  Light Work -- resume when comfortable off pain medications.  (If you can drive, you probably can work.)  Strenuous Work/Activity -- limit lifting to 20 pounds for 2 weeks.  Progressively increase with time.  Active Sports (running, biking, etc.) -- cautiously resume after 2 weeks.  DISCOMFORT:  Use pain medications as prescribed by your surgeon.  Take the pain medication with some food, when possible, to minimize side effects.  Intermittent use of ice packs at the incision sites may help during the first 48 hours.  Expect gradual improvement.  You may experience shoulder pain, which is due to the air placed within your abdomen during the procedure.  This is temporary and usually passes within 2 days.  DIET:  Drink plenty of fluids.  While taking pain medications, increase dietary fiber or add a fiber supplementation like Metamucil or Citrucel to help prevent constipation - a possible side effect of pain medications.  If taking Metamucil or Citrucel, take with plenty of fluids as instructed.  It is not uncommon to experience some bowel changes (loose stools or constipation) after surgery.  Your body has to adapt to you no longer having a gall bladder.  To help minimize this side  effect, avoid fatty foods for the first week after surgery.  You may then slowly increase the amount of fatty foods in your diet.    NAUSEA:  If nauseated from the anesthetic/pain meds; rest in bed, get up cautiously with assistance, and drink clear liquids (juice, tea, broth).  RETURN APPOINTMENT:  Schedule a follow-up visit 2-3 weeks post-op.  Office Phone:  [phone]    CONTACT US IF THE FOLLOWING DEVELOPS:  1.  A fever that is above 101   2.  If there is a large amount of drainage, bleeding, or swelling.  3.  Severe pain that is not relieved by your prescription.  4.  Drainage that is thick, cloudy, yellow, green or white.  5.  Any other questions not answered by  Frequently Asked Questions  sheet.     GENERAL ANESTHESIA OR SEDATION ADULT DISCHARGE INSTRUCTIONS   SPECIAL PRECAUTIONS FOR 24 HOURS AFTER SURGERY    IT IS NOT UNUSUAL TO FEEL LIGHT-HEADED OR FAINT, UP TO 24 HOURS AFTER SURGERY OR WHILE TAKING PAIN MEDICATION.  IF YOU HAVE THESE SYMPTOMS; SIT FOR A FEW MINUTES BEFORE STANDING AND HAVE SOMEONE ASSIST YOU WHEN YOU GET UP TO WALK OR USE THE BATHROOM.    YOU SHOULD REST AND RELAX FOR THE NEXT 24 HOURS AND YOU MUST MAKE ARRANGEMENTS TO HAVE SOMEONE STAY WITH YOU FOR AT LEAST 24 HOURS AFTER YOUR DISCHARGE.  AVOID HAZARDOUS AND STRENUOUS ACTIVITIES.  DO NOT MAKE IMPORTANT DECISIONS FOR 24 HOURS.    DO NOT DRIVE ANY VEHICLE OR OPERATE MECHANICAL EQUIPMENT FOR 24 HOURS FOLLOWING THE END OF YOUR SURGERY.  EVEN THOUGH YOU MAY FEEL NORMAL, YOUR REACTIONS MAY BE AFFECTED BY THE MEDICATION YOU HAVE RECEIVED.    DO NOT DRINK ALCOHOLIC BEVERAGES FOR 24 HOURS FOLLOWING YOUR SURGERY.    DRINK CLEAR LIQUIDS (APPLE JUICE, GINGER ALE, 7-UP, BROTH, ETC.).  PROGRESS TO YOUR REGULAR DIET AS YOU FEEL ABLE.    YOU MAY HAVE A DRY MOUTH, A SORE THROAT, MUSCLES ACHES OR TROUBLE SLEEPING.  THESE SHOULD GO AWAY AFTER 24 HOURS.    CALL YOUR DOCTOR FOR ANY OF THE FOLLOWING:  SIGNS OF INFECTION (FEVER, GROWING TENDERNESS AT THE  SURGERY SITE, A LARGE AMOUNT OF DRAINAGE OR BLEEDING, SEVERE PAIN, FOUL-SMELLING DRAINAGE, REDNESS OR SWELLING.    IT HAS BEEN OVER 8 TO 10 HOURS SINCE SURGERY AND YOU ARE STILL NOT ABLE TO URINATE (PASS WATER).

## 2019-03-03 NOTE — ANESTHESIA PREPROCEDURE EVALUATION
Anesthesia Pre-Procedure Evaluation    Patient: Keo Pinto   MRN: [id number] : 1985          Preoperative Diagnosis: unknown    Procedure(s):  LAPAROSCOPIC CHOLECYSTECTOMY    Past Medical History:   Diagnosis Date     Esophageal reflux      Hypertension      Migraine headache      Obese      Past Surgical History:   Procedure Laterality Date     ADENOIDECTOMY       ENDOSCOPIC RETROGRADE CHOLANGIOPANCREATOGRAM N/A 2019    Procedure: ENDOSCOPIC RETROGRADE CHOLANGIOPANCREATOGRAM with Sphincterotomy and Stone Extraction.;  Surgeon: Amina Hernandez MD;  Location: [facility] OR     ESOPHAGOSCOPY, GASTROSCOPY, DUODENOSCOPY (EGD), COMBINED N/A 2019    Procedure: COMBINED ENDOSCOPIC ULTRASOUND, Endoscopic Retrograde Cholangiopancreatoscopy;  Surgeon: Amina Hernandez MD;  Location: [facility] OR     PE TUBES      As a young child     Anesthesia Evaluation     .             ROS/MED HX    ENT/Pulmonary:  - neg pulmonary ROS     Neurologic:  - neg neurologic ROS     Cardiovascular:     (+) hypertension----. : . . . :. .       METS/Exercise Tolerance:     Hematologic:  - neg hematologic  ROS       Musculoskeletal:  - neg musculoskeletal ROS       GI/Hepatic:     (+) GERD       Renal/Genitourinary:  - ROS Renal section negative       Endo: Comment: .Body mass index is 51.54 kg/m .      (+) Obesity, .      Psychiatric:  - neg psychiatric ROS       Infectious Disease:  - neg infectious disease ROS       Malignancy:         Other:    - neg other ROS                      Physical Exam  Normal systems: cardiovascular and pulmonary    Airway   Mallampati: III    Dental     Cardiovascular   Rhythm and rate: regular and normal      Pulmonary    breath sounds clear to auscultation            Lab Results   Component Value Date    WBC 10.8 2019    HGB 13.0 (L) 2019    HCT 38.6 (L) 2019     2019     2019    POTASSIUM 3.1 (L) 2019    CHLORIDE 103 2019    CO2 26  03/03/2019    BUN 5 (L) 03/03/2019    CR 0.66 03/03/2019    GLC 93 03/03/2019    SOREN 8.1 (L) 03/03/2019    MAG 2.2 05/22/2018    ALBUMIN 2.4 (L) 03/03/2019    PROTTOTAL 6.7 (L) 03/03/2019     (H) 03/03/2019    AST 28 03/03/2019    ALKPHOS 83 03/03/2019    BILITOTAL 1.1 03/03/2019    LIPASE 101 03/03/2019    TSH 3.03 05/22/2018       Preop Vitals  BP Readings from Last 3 Encounters:   03/03/19 (!) 158/100   03/02/19 144/83   01/28/19 132/84    Pulse Readings from Last 3 Encounters:   03/03/19 100   03/01/19 98   01/28/19 63      Resp Readings from Last 3 Encounters:   03/03/19 16   03/02/19 16   01/22/19 16    SpO2 Readings from Last 3 Encounters:   03/03/19 95%   03/02/19 91%   01/28/19 98%      Temp Readings from Last 1 Encounters:   03/03/19 97.6  F (36.4  C) (Temporal)    Ht Readings from Last 1 Encounters:   03/03/19 1.829 m (6')      Wt Readings from Last 1 Encounters:   03/03/19 (!) 172.4 kg (380 lb)    Estimated body mass index is 51.54 kg/m  as calculated from the following:    Height as of this encounter: 1.829 m (6').    Weight as of this encounter: 172.4 kg (380 lb).       Anesthesia Plan      History & Physical Review  History and physical reviewed and following examination; no interval change.    ASA Status:  3 .        Plan for General and ETT with Intravenous induction. Maintenance will be Inhalation and Balanced.    PONV prophylaxis:  Ondansetron (or other 5HT-3) and Dexamethasone or Solumedrol       Postoperative Care  Postoperative pain management:  IV analgesics, Oral pain medications and Multi-modal analgesia.      Consents  Anesthetic plan, risks, benefits and alternatives discussed with:  Patient or representative..                 Paul Cunningham DO                    .

## 2019-03-04 ENCOUNTER — TELEPHONE (OUTPATIENT)
Dept: PEDIATRICS | Facility: CLINIC | Age: 34
End: 2019-03-04

## 2019-03-04 VITALS
TEMPERATURE: 97.2 F | WEIGHT: 315 LBS | SYSTOLIC BLOOD PRESSURE: 143 MMHG | DIASTOLIC BLOOD PRESSURE: 88 MMHG | RESPIRATION RATE: 18 BRPM | BODY MASS INDEX: 42.66 KG/M2 | HEART RATE: 74 BPM | HEIGHT: 72 IN | OXYGEN SATURATION: 95 %

## 2019-03-04 LAB
ALBUMIN SERPL-MCNC: 2.2 G/DL (ref 3.4–5)
ALP SERPL-CCNC: 71 U/L (ref 40–150)
ALT SERPL W P-5'-P-CCNC: 108 U/L (ref 0–70)
ANION GAP SERPL CALCULATED.3IONS-SCNC: 6 MMOL/L (ref 3–14)
AST SERPL W P-5'-P-CCNC: 34 U/L (ref 0–45)
BASOPHILS # BLD AUTO: 0 10E9/L (ref 0–0.2)
BASOPHILS NFR BLD AUTO: 0.1 %
BILIRUB SERPL-MCNC: 0.5 MG/DL (ref 0.2–1.3)
BUN SERPL-MCNC: 6 MG/DL (ref 7–30)
CALCIUM SERPL-MCNC: 8.2 MG/DL (ref 8.5–10.1)
CHLORIDE SERPL-SCNC: 105 MMOL/L (ref 94–109)
CO2 SERPL-SCNC: 27 MMOL/L (ref 20–32)
CREAT SERPL-MCNC: 0.65 MG/DL (ref 0.66–1.25)
DIFFERENTIAL METHOD BLD: ABNORMAL
EOSINOPHIL # BLD AUTO: 0 10E9/L (ref 0–0.7)
EOSINOPHIL NFR BLD AUTO: 0.1 %
ERYTHROCYTE [DISTWIDTH] IN BLOOD BY AUTOMATED COUNT: 13.1 % (ref 10–15)
GFR SERPL CREATININE-BSD FRML MDRD: >90 ML/MIN/{1.73_M2}
GLUCOSE BLDC GLUCOMTR-MCNC: 106 MG/DL (ref 70–99)
GLUCOSE SERPL-MCNC: 111 MG/DL (ref 70–99)
HCT VFR BLD AUTO: 37.5 % (ref 40–53)
HGB BLD-MCNC: 12.6 G/DL (ref 13.3–17.7)
IMM GRANULOCYTES # BLD: 0.1 10E9/L (ref 0–0.4)
IMM GRANULOCYTES NFR BLD: 0.9 %
LYMPHOCYTES # BLD AUTO: 0.9 10E9/L (ref 0.8–5.3)
LYMPHOCYTES NFR BLD AUTO: 8.9 %
MCH RBC QN AUTO: 30.3 PG (ref 26.5–33)
MCHC RBC AUTO-ENTMCNC: 33.6 G/DL (ref 31.5–36.5)
MCV RBC AUTO: 90 FL (ref 78–100)
MONOCYTES # BLD AUTO: 0.7 10E9/L (ref 0–1.3)
MONOCYTES NFR BLD AUTO: 7.4 %
NEUTROPHILS # BLD AUTO: 8.1 10E9/L (ref 1.6–8.3)
NEUTROPHILS NFR BLD AUTO: 82.6 %
NRBC # BLD AUTO: 0 10*3/UL
NRBC BLD AUTO-RTO: 0 /100
PLATELET # BLD AUTO: 224 10E9/L (ref 150–450)
POTASSIUM SERPL-SCNC: 3.8 MMOL/L (ref 3.4–5.3)
PROT SERPL-MCNC: 6.7 G/DL (ref 6.8–8.8)
RBC # BLD AUTO: 4.16 10E12/L (ref 4.4–5.9)
SODIUM SERPL-SCNC: 138 MMOL/L (ref 133–144)
WBC # BLD AUTO: 9.8 10E9/L (ref 4–11)

## 2019-03-04 PROCEDURE — 96361 HYDRATE IV INFUSION ADD-ON: CPT

## 2019-03-04 PROCEDURE — 99217 ZZC OBSERVATION CARE DISCHARGE: CPT | Performed by: INTERNAL MEDICINE

## 2019-03-04 PROCEDURE — 25000132 ZZH RX MED GY IP 250 OP 250 PS 637: Performed by: PHYSICIAN ASSISTANT

## 2019-03-04 PROCEDURE — 85025 COMPLETE CBC W/AUTO DIFF WBC: CPT | Performed by: SURGERY

## 2019-03-04 PROCEDURE — 80053 COMPREHEN METABOLIC PANEL: CPT | Performed by: SURGERY

## 2019-03-04 PROCEDURE — 25000132 ZZH RX MED GY IP 250 OP 250 PS 637: Performed by: SURGERY

## 2019-03-04 PROCEDURE — 99207 ZZC CDG-CODE CATEGORY CHANGED: CPT | Performed by: INTERNAL MEDICINE

## 2019-03-04 PROCEDURE — 00000146 ZZHCL STATISTIC GLUCOSE BY METER IP

## 2019-03-04 PROCEDURE — G0378 HOSPITAL OBSERVATION PER HR: HCPCS

## 2019-03-04 PROCEDURE — 25800030 ZZH RX IP 258 OP 636: Performed by: SURGERY

## 2019-03-04 PROCEDURE — 36415 COLL VENOUS BLD VENIPUNCTURE: CPT | Performed by: SURGERY

## 2019-03-04 RX ORDER — OXYCODONE HYDROCHLORIDE 5 MG/1
5-10 TABLET ORAL EVERY 4 HOURS PRN
Qty: 15 TABLET | Refills: 0 | Status: SHIPPED | OUTPATIENT
Start: 2019-03-04 | End: 2019-03-07

## 2019-03-04 RX ORDER — SENNOSIDES 8.6 MG
1 TABLET ORAL 2 TIMES DAILY
Status: DISCONTINUED | OUTPATIENT
Start: 2019-03-04 | End: 2019-03-04 | Stop reason: HOSPADM

## 2019-03-04 RX ORDER — SENNOSIDES 8.6 MG
1 TABLET ORAL 2 TIMES DAILY
Qty: 10 TABLET | Refills: 0 | Status: SHIPPED | OUTPATIENT
Start: 2019-03-04 | End: 2019-03-08

## 2019-03-04 RX ADMIN — PANTOPRAZOLE SODIUM 40 MG: 40 TABLET, DELAYED RELEASE ORAL at 08:02

## 2019-03-04 RX ADMIN — POTASSIUM CHLORIDE, DEXTROSE MONOHYDRATE AND SODIUM CHLORIDE: 150; 5; 450 INJECTION, SOLUTION INTRAVENOUS at 05:02

## 2019-03-04 RX ADMIN — OXYCODONE HYDROCHLORIDE 5 MG: 5 TABLET ORAL at 04:07

## 2019-03-04 RX ADMIN — SENNOSIDES 1 TABLET: 8.6 TABLET, FILM COATED ORAL at 09:27

## 2019-03-04 RX ADMIN — OXYCODONE HYDROCHLORIDE 5 MG: 5 TABLET ORAL at 13:53

## 2019-03-04 RX ADMIN — METOPROLOL SUCCINATE 200 MG: 100 TABLET, EXTENDED RELEASE ORAL at 09:28

## 2019-03-04 RX ADMIN — LISINOPRIL 10 MG: 10 TABLET ORAL at 09:27

## 2019-03-04 NOTE — PLAN OF CARE
PRIMARY DIAGNOSIS: Lap Mirta Recovery  OUTPATIENT/OBSERVATION GOALS TO BE MET BEFORE DISCHARGE:  1. Stable vital signs Yes  2. Tolerating diet:Yes  3. Pain controlled with oral pain medications:  Yes  4. Positive bowel sounds:  Yes  5. Voiding without difficulty:  Yes  6. Able to ambulate:  Yes  7. Provider specific discharge goals met:  Yes    Discharge Planner Nurse   Safe discharge environment identified: Yes  Barriers to discharge: No       Entered by: Dave Villalta 03/04/2019 3:03 PM   /81 (BP Location: Left arm)   Pulse 74   Temp 97  F (36.1  C) (Oral)   Resp 16   Ht 1.829 m (6')   Wt (!) 172.4 kg (380 lb)   SpO2 92%   BMI 51.54 kg/m    AOx4, hx of obesity, HTN, migraines, GERD. Post op day 1. Tolerating full liquid dietwell, No N/V. Taking time on diet. Off Capno. On RA. BS: active and BM small. Passing gas. Saline locked. Pt ready to discharge today with parents. Waiting for Dad to come .  Please review provider order for any additional goals.   Nurse to notify provider when observation goals have been met and patient is ready for discharge.

## 2019-03-04 NOTE — PLAN OF CARE
Pt remains admitted for cholecystitis  A/Ox4  Pain in abdomen reported, IV dilaudid given prior to procedure  No nausea reported  Lap cholectomy performed during shift  Returned to unit at 7pm   O2 1L   Capno in place  Up SBA  Clear liquid diet  Discharge possible tomorrow  Will continue to monitor

## 2019-03-04 NOTE — PLAN OF CARE
PRIMARY DIAGNOSIS: Lap Mirta Recovery  OUTPATIENT/OBSERVATION GOALS TO BE MET BEFORE DISCHARGE:  1. Stable vital signs Yes  2. Tolerating diet:Yes  3. Pain controlled with oral pain medications:  Yes  4. Positive bowel sounds:  Yes  5. Voiding without difficulty:  Yes  6. Able to ambulate:  Yes  7. Provider specific discharge goals met:  Yes    Discharge Planner Nurse   Safe discharge environment identified: Yes  Barriers to discharge: No       Entered by: Dave Villalta 03/04/2019 1:36 PM   /81 (BP Location: Left arm)   Pulse 74   Temp 97  F (36.1  C) (Oral)   Resp 16   Ht 1.829 m (6')   Wt (!) 172.4 kg (380 lb)   SpO2 92%   BMI 51.54 kg/m    AOx4, hx of obesity, HTN, migraines, GERD. Post op day 1. Tolerating full liquid dietwell, No N/V. Taking time on diet. Off Capno. On RA. BS: hypoactive. No gas. Saline locked. Possible discharge today.  Please review provider order for any additional goals.   Nurse to notify provider when observation goals have been met and patient is ready for discharge.

## 2019-03-04 NOTE — PROGRESS NOTES
[facility]   General Surgery Progress Note           Assessment and Plan:   Assessment:   -POD#1 s/p Laparoscopic cholecystectomy  -H/o recent gallstone pancreatitis admission  -Postoperative hypoxia, oxygen supplementation  -Resolution of pre-op LUQ pain      Plan:   -Pain management: acetaminophen, prn oxycodone  -Diet: advance as tolerated to low fat diet, continue low fat 1-2 weeks.  -OK from surgical standpoint to DC today if tolerating diet.  DC Rx: oxycodone, senokot.  Additional OTC bowel program prn.    -RTC 2-3 weeks for postop appt.  Discharge instructions were reviewed with the patient in detail.  All his questions/concerns were addressed.  He is aware that a printed copy of instructions will be given to him upon discharge.         Interval History:   Comfortable in bed, mild pain at incision sites.  Pre-op pain resolved.  On oxygen; denies h/o JUAN JOSE, but at risk for this due to obesity.  Tolerating clear liquids without nausea.  +flatus, no BM; reviewed constipation risk and addition of senokot.          Physical Exam:   Blood pressure 139/76, pulse 74, temperature 97.5  F (36.4  C), temperature source Oral, resp. rate 20, height 1.829 m (6'), weight (!) 172.4 kg (380 lb), SpO2 93 %.    I/O last 3 completed shifts:  In: 3144 [P.O.:640; I.V.:2504]  Out: 10 [Blood:10]    Abdomen:   Soft, morbidly obese, non-distended, mild tenderness noted around incisions and hypoactive bowel sounds   Incs - steri-strips intact, no drainage/erythema             Data:     Recent Labs   Lab 03/04/19 0625 03/03/19  0653 03/02/19  0651   WBC 9.8 10.8 13.1*   HGB 12.6* 13.0* 13.8   HCT 37.5* 38.6* 41.8   MCV 90 89 91    181 201     Recent Labs   Lab 03/04/19 0625 03/03/19 2110 03/03/19  0653 03/02/19  0651     --  137 137   POTASSIUM 3.8 3.8 3.1* 3.3*   CHLORIDE 105  --  103 104   CO2 27  --  26 27   ANIONGAP 6  --  8 6   *  --  93 96   BUN 6*  --  5* 5*   CR 0.65*  --  0.66 0.79    GFRESTIMATED >90  --  >90 >90   GFRESTBLACK >90  --  >90 >90   SOREN 8.2*  --  8.1* 7.9*   PROTTOTAL 6.7*  --  6.7* 6.7*   ALBUMIN 2.2*  --  2.4* 2.7*   BILITOTAL 0.5  --  1.1 1.7*   ALKPHOS 71  --  83 96   AST 34  --  28 68*   *  --  158* 252*       Heather Mcknight PA-C     Seen and agree,    Leland Crowder MD  [facility]

## 2019-03-04 NOTE — TELEPHONE ENCOUNTER
Please contact patient for In-patient follow up.  [phone] (home)     Visit date: 02/28 -D/C'd 03/02/2019  Diagnosis listed:Gallstone Pancreatitis  Number of visits in past 12 months:ED0/ IP 1

## 2019-03-04 NOTE — DISCHARGE SUMMARY
[facility]    Discharge Summary  Hospitalist    Date of Admission:  3/3/2019  Date of Discharge:  3/4/2019  Discharging Provider: Malcolm Esteban MD  Date of Service (when I saw the patient): 03/04/19    Discharge Diagnoses      1.   Acute cholecystitis status post laparoscopic cholecystectomy    2.  Recent gallstone pancreatitis requiring  EUS with ERCP on 2/28/19 with stone removal     3. Hypertension     4. GERD     History of Present Illness   Keo Pinto is an 33 year old male who presented with history of gallstone pancreatitis status post EUS and ERCP on 2/28, cholelithiasis, came to emergency room yesterday with a complaint of abdominal pain. Workup in the emergency room includes right upper quadrant ultrasound showed cholelithiasis with evidence of cholecystitis. Patient was evaluated by surgery and underwent laparoscopic cholecystectomy was a day of admission.  He tolerated the procedure well.  He had no postop fever. His vitals remained stable.      Hospital Course    Keo Pinto is a 33 year old male with a PMH significant for gallstone pancreatitis s/p EUS & ERCP (2/28/19), cholelithiasis, HTN, who was admitted with complaints of acute epigastric and left upper quadrant pain with associated nausea and has RUQ US findings consistent with acute cholecystitis.  On arrival to emergency room, his vital signs showed heart rate 98.6, pulse 103, blood pressure 154/117, oxygen saturation 96% on room air. Lab workup revealed hypokalemia of 3.1 and improving transaminases with   and AST 28. Lipase 101. Lactic acid 1.4. RUQ US showed cholelithiasis, gallbladder distention, gallbladder wall thickening, and a small amount of pericholecystic fluid suggestive of acute cholecystitis; also showed mild diffuse fatty infiltration of the liver; pancreas poorly visualized. Patient was admitted for acute cholecystitis. He underwent lap daija on 3/3. He had significant improvement of his  symptoms.  He had no nausea or vomiting.  He passed gas and had a small amount of stool. Surgery evaluated him today and  recommended to continue low fat diet for 1-2 weeks, follow-up with surgery in 2-3 weeks for postop follow-up.  He was prescribed oxycodone and Senokot by general surgery.       2. Hypertension: Continued on metoprolol 200 mg daily and lisinopril 10 mg daily with hold parameters.     3. GERD: Continued on pantoprazole 40 mg daily     Significant Results and Procedures   Results for orders placed or performed during the hospital encounter of 03/03/19   Abdomen US, limited (RUQ only)    Narrative    US ABDOMEN LIMITED   3/3/2019 7:34 AM     HISTORY: Abdominal pain after ERCP.    COMPARISON: 2/28/2019.    FINDINGS: Mild diffuse fatty infiltration of the liver. No focal  hepatic lesions are identified. Multiple gallstones are noted within  the gallbladder. The gallbladder wall appears thickened. Gallbladder  distention has increased slightly since the previous exam. There is a  small amount of pericholecystic fluid. The sonographer reports a  negative sonographic Walter's sign. No intra or extrahepatic bile duct  dilatation. The common duct could not be visualized in its entirety.  Limited evaluation of the right kidney is unremarkable. Pancreas is  obscured by overlying bowel gas, and could not be evaluated. The exam  was limited related to patient body habitus.      Impression    IMPRESSION:   1. Cholelithiasis, gallbladder distention, gallbladder wall  thickening, and a small amount of pericholecystic fluid again suggest  acute cholecystitis. Please clinically correlate.  2. Mild diffuse fatty infiltration of the liver.  3. Nonvisualization of the pancreas.     CHUN GUY MD         Pending Results   These results will be followed up by surgery  Unresulted Labs Ordered in the Past 30 Days of this Admission     Date and Time Order Name Status Description    3/3/2019 1555 Surgical pathology  exam In process           Code Status   Full Code       Primary Care Physician   Tian Perez        Discharge Disposition   Discharged to home  Condition at discharge: Stable    Consultations This Hospital Stay   SURGERY GENERAL IP CONSULT    Time Spent on this Encounter   I, Malcolm Esteban MD, personally saw the patient today and spent greater than 30 minutes discharging this patient.    Discharge Orders      Reason for your hospital stay    Acute cholecystitis status post lap cholecystectomy     Activity    Your activity upon discharge: activity as tolerated     Follow-up and recommended labs and tests     Follow up with primary care provider, Tian Perez, within 7 days  Follow-up with surgery in 2-3 weeks for postop follow-up     Diet    Follow this diet upon discharge: Orders Placed This Encounter      Advance Diet as Tolerated:low fat diet, continue low fat 1-2 weeks.     Discharge Medications   Current Discharge Medication List      START taking these medications    Details   oxyCODONE (ROXICODONE) 5 MG tablet Take 1-2 tablets (5-10 mg) by mouth every 4 hours as needed for moderate to severe pain Take with food to minimize side effects.  Qty: 15 tablet, Refills: 0    Associated Diagnoses: Acute cholecystitis; S/P laparoscopic cholecystectomy      sennosides (SENOKOT) 8.6 MG tablet Take 1 tablet by mouth 2 times daily  Qty: 10 tablet, Refills: 0    Associated Diagnoses: Acute cholecystitis; S/P laparoscopic cholecystectomy         CONTINUE these medications which have NOT CHANGED    Details   acetaminophen (TYLENOL) 500 MG tablet Take 500-1,000 mg by mouth every 6 hours as needed for mild pain Max dose of 3000mg      Lidocaine-Menthol 4-4 % PTCH Externally apply topically daily as needed      lisinopril (PRINIVIL/ZESTRIL) 10 MG tablet Take 1 tablet (10 mg) by mouth daily  Qty: 90 tablet, Refills: 3    Associated Diagnoses: Essential hypertension      metoprolol succinate ER (TOPROL-XL) 200 MG 24  hr tablet Take 1 tablet (200 mg) by mouth daily  Qty: 90 tablet, Refills: 3    Associated Diagnoses: Essential hypertension      pantoprazole (PROTONIX) 40 MG EC tablet Take 1 tablet (40 mg) by mouth daily  Qty: 30 tablet, Refills: 0    Associated Diagnoses: Gastroesophageal reflux disease with esophagitis               Allergies   No Known Allergies  Data   Most Recent 3 CBC's:  Recent Labs   Lab Test 03/04/19  0625 03/03/19  0653 03/02/19  0651   WBC 9.8 10.8 13.1*   HGB 12.6* 13.0* 13.8   MCV 90 89 91    181 201      Most Recent 3 BMP's:  Recent Labs   Lab Test 03/04/19  0625 03/03/19  2110 03/03/19  0653 03/02/19  0651     --  137 137   POTASSIUM 3.8 3.8 3.1* 3.3*   CHLORIDE 105  --  103 104   CO2 27  --  26 27   BUN 6*  --  5* 5*   CR 0.65*  --  0.66 0.79   ANIONGAP 6  --  8 6   SOREN 8.2*  --  8.1* 7.9*   *  --  93 96     Most Recent 2 LFT's:  Recent Labs   Lab Test 03/04/19  0625 03/03/19  0653   AST 34 28   * 158*   ALKPHOS 71 83   BILITOTAL 0.5 1.1     Most Recent INR's and Anticoagulation Dosing History:  Anticoagulation Dose History     There is no flowsheet data to display.        Most Recent 3 Troponin's:  Recent Labs   Lab Test 07/29/12  1335   TROPI <0.012     Most Recent Cholesterol Panel:  Recent Labs   Lab Test 03/22/18  1140   CHOL 120   LDL 40   HDL 39*   TRIG 203*     Most Recent 6 Bacteria Isolates From Any Culture (See EPIC Reports for Culture Details):  Recent Labs   Lab Test 10/21/17  1010   CULT No beta hemolytic Streptococcus Group A isolated     Most Recent TSH, T4 and A1c Labs:  Recent Labs   Lab Test 05/22/18  0920   TSH 3.03

## 2019-03-04 NOTE — PLAN OF CARE
Patient alert and oriented x4. Up SBA. Voiding good amounts. Patient denied pain for most of night early morning, pain 5/10, PO oxycodone given x1, patient reported decrease of pain. Placed on 2L oxygen with oxymask overnight for oxygen sats in the mid 80s, oxygen sats in the mid 90s while on oxygen. Potassium recheck 3.8. Possible discharge today.

## 2019-03-04 NOTE — PLAN OF CARE
Pt admitted for lap daija.   Discharging to home with transportation by parents.  Medications filled at [facility] and sent with pt.  Discharge instructions covered and pt verbalized understanding of orders and medications.  Pt understanding of when to follow up with providers.

## 2019-03-04 NOTE — TELEPHONE ENCOUNTER
ED / Discharge Outreach Protocol    Patient Contact    Attempt # 1    Was call answered?  No.  Unable to leave message.  Phone number does not go through-continuous fast busy tone.  Attempted multiple times.    Carissa Crowder RN

## 2019-03-05 LAB — COPATH REPORT: NORMAL

## 2019-03-08 ENCOUNTER — OFFICE VISIT (OUTPATIENT)
Dept: PEDIATRICS | Facility: CLINIC | Age: 34
End: 2019-03-08
Payer: COMMERCIAL

## 2019-03-08 VITALS
TEMPERATURE: 98.5 F | SYSTOLIC BLOOD PRESSURE: 134 MMHG | HEIGHT: 72 IN | WEIGHT: 315 LBS | OXYGEN SATURATION: 98 % | DIASTOLIC BLOOD PRESSURE: 86 MMHG | BODY MASS INDEX: 42.66 KG/M2 | HEART RATE: 110 BPM

## 2019-03-08 DIAGNOSIS — B37.0 THRUSH: Primary | ICD-10-CM

## 2019-03-08 DIAGNOSIS — Z90.49 S/P CHOLECYSTECTOMY: ICD-10-CM

## 2019-03-08 PROCEDURE — 99214 OFFICE O/P EST MOD 30 MIN: CPT | Performed by: INTERNAL MEDICINE

## 2019-03-08 RX ORDER — NYSTATIN 100000/ML
500000 SUSPENSION, ORAL (FINAL DOSE FORM) ORAL 4 TIMES DAILY
Qty: 200 ML | Refills: 0 | Status: SHIPPED | OUTPATIENT
Start: 2019-03-08 | End: 2019-03-29

## 2019-03-08 ASSESSMENT — MIFFLIN-ST. JEOR: SCORE: 2614.35

## 2019-03-08 NOTE — PROGRESS NOTES
SUBJECTIVE:   Keo Pinto is a 33 year old male who presents to clinic today for the following health issues:      Hospital Follow-up Visit:    Hospital/Nursing Home/IP Rehab Facility: [facility]  Date of Admission: 3/3/19  Date of Discharge: 3/4/19  Reason(s) for Admission: Acute cholecystitis status post laparoscopic cholecystectomy, Recent gallstone pancreatitis requiring  EUS with ERCP on 2/28/19 with stone removal, Hypertension, and GERD             Problems taking medications regularly:  None       Medication changes since discharge: None       Problems adhering to non-medication therapy:  None    Summary of hospitalization:  [facility] discharge summary reviewed  Diagnostic Tests/Treatments reviewed.  Follow up needed: none  Other Healthcare Providers Involved in Patient s Care:         Specialist appointment - surgery  Update since discharge: improved.     Post Discharge Medication Reconciliation: discharge medications reconciled and changed, per note/orders (see AVS).  Plan of care communicated with patient     Checking BP at home.  Last night 145/90  Typically 140's / 90's since discharge    Noting white plaque on his tongue. Does not scrape off.  No prior hx of thrush.   Did have at least a few doses of atbx in the hospital.     Having some ongoing abdominal pains. Are improving.  Diet is slowly advancing.  No issues w/ vomiting. No blood in his stools.    Is having loose stools, several per day.  Rx Senokot at discharge.  Has not been taking oxycodone.  Discussed stopping senokot, checking stool studies if his sx do not improve.    Problem list and histories reviewed & adjusted, as indicated.    Labs reviewed in EPIC    Reviewed and updated as needed this visit by Provider  Tobacco  Allergies  Meds  Problems  Med Hx  Surg Hx  Fam Hx         ROS:  Constitutional, HEENT, cardiovascular, pulmonary, gi and gu systems are negative, except as otherwise noted.    OBJECTIVE:  "    /86   Pulse 110   Temp 98.5  F (36.9  C) (Tympanic)   Ht 1.816 m (5' 11.5\")   Wt (!) 164.4 kg (362 lb 8 oz)   SpO2 98%   BMI 49.85 kg/m    Body mass index is 49.85 kg/m .  GEN: no distress  SKIN: no rashes. Scattered purpura from phlebotomy.  HEENT: PERRL. No icterus. No nasal discharge. OP moist. Thick white plaque on the tongue. No buccal lesions noted.   NECK: Supple.  LUNGS: Clear to auscultation bilaterally. No rhonchi, rales, wheezes or retractions.  CV: Regular rate and rhythm.  No murmurs, rubs or gallops. Pulses 2+ radial.  ABD: BS+. S, ND.   EXTR: no edema  PSYCH: Normal affect. Well groomed. Good eye contact.     ASSESSMENT/PLAN:       ICD-10-CM    1. Thrush B37.0 nystatin (MYCOSTATIN) 824540 UNIT/ML suspension   2. S/P cholecystectomy Z90.49      Patient Instructions   Stop Senokot    Start Nystatin   5 mL swish and swallow 4 times per day for 10 days    Call if the thrush is not improved within a week       Tian Perez MD  [facility]  "

## 2019-03-08 NOTE — PATIENT INSTRUCTIONS
Stop Senokot    Start Nystatin   5 mL swish and swallow 4 times per day for 10 days    Call if the thrush is not improved within a week

## 2019-03-12 ENCOUNTER — MYC MEDICAL ADVICE (OUTPATIENT)
Dept: PEDIATRICS | Facility: CLINIC | Age: 34
End: 2019-03-12

## 2019-03-12 NOTE — LETTER
[address]  [address]  [facility] [address]  [phone]      2019      RE: Keo Pinto  : 1985        To Whom It May Concern:       I am the primary care physician for Mr. Pinto.    This letter is to inform you that he was hospitalized twice within the past month.    The dates of his hospital stays were:    19 - 3/2/19    3/3/19 - 3/4/19      Tian Merritt MD

## 2019-03-13 NOTE — TELEPHONE ENCOUNTER
See pt's MC message. Pt recently had laparoscopic cholecystectomy. Please advise.    Boris, RN  Triage Nurse

## 2019-03-14 NOTE — TELEPHONE ENCOUNTER
Please e-mail the letter in my outbasket to:  [email or web address]

## 2019-03-21 ENCOUNTER — OFFICE VISIT (OUTPATIENT)
Dept: SURGERY | Facility: CLINIC | Age: 34
End: 2019-03-21
Payer: COMMERCIAL

## 2019-03-21 VITALS
HEART RATE: 86 BPM | RESPIRATION RATE: 16 BRPM | OXYGEN SATURATION: 97 % | BODY MASS INDEX: 42.66 KG/M2 | DIASTOLIC BLOOD PRESSURE: 86 MMHG | WEIGHT: 315 LBS | HEIGHT: 72 IN | SYSTOLIC BLOOD PRESSURE: 120 MMHG

## 2019-03-21 DIAGNOSIS — Z09 SURGICAL FOLLOWUP VISIT: Primary | ICD-10-CM

## 2019-03-21 PROCEDURE — 99024 POSTOP FOLLOW-UP VISIT: CPT | Performed by: PHYSICIAN ASSISTANT

## 2019-03-21 ASSESSMENT — MIFFLIN-ST. JEOR: SCORE: 2612.08

## 2019-03-21 NOTE — PROGRESS NOTES
"[facility] Note   Subjective:  Keo Pinto is here for his first postoperative visit.  He underwent Laparoscopic Cholecystectomy by Dr. Crowder on March/03.  Pathology revealed: chronic cholecystitis with stones.  He is now over 2 weeks postop.  He did see his PCP after surgery due to thrush, treatment prescribed.      Today he is feeling well, tolerating a low fat diet but notes occasionally food \"goes right thru me\" and overall he feels full pretty quickly after starting to eat a meal; has lost 40 lbs since surgery so far.  He denies incision concerns.  His recent medications include: none.  He has no other concerns about his recovery today.      Additional findings on US in ED:  Fatty liver.  Pt made aware of these findings.  No additional imaging needed at this time.  Discussed dietary changes and weight loss.     Objective:  Abd - soft, non-tender, non-distended  Incs - steri-strips removed, healing well, no erythema/bruising, +normal healing ridges/density, no seroma/hematoma noted, no hernia noted    Assessment:  S/p Laparoscopic Cholecystectomy; unremarkable recovery.    Plan:  Keo will continue low fat diet and healthy food choices in attempts at ongoing weight loss.  Monitor above symptoms of early satiety and occasional loose stools for resolution over the following months if related to post-cholecystectomy transitional period.  If ongoing loose stools, may need consideration of questran.      He may continue to slowly advance his activities at this time.  General recommendation is to remain at a 30 lb weight restriction until 3 weeks after surgery.  After that time, he may increase activity as tolerated.  He may continue to utilize OTC pain management options as well as use of ice/heat to sites for comfort.  He should expect progressive resolution of the healing ridge along the incisional sites, normalizing bowel function, and improvement of food intolerances over the following " 2-3 months.    Pt is recommended to contact the office if worsening pain, onset of fever/redness at any inc site, or new drainage from the area.  Pt also recommended to call office at any time if ongoing questions/concerns during recovery, but otherwise may follow-up on a prn basis.  Keo is in agreement with this plan.    Heather Mcknight PA-C      Please route or send letter to:  PCP

## 2019-03-21 NOTE — LETTER
"2019    RE: Keo Pinto, : 1985    [facility] Note   Subjective:  Keo Pinto is here for his first postoperative visit.  He underwent Laparoscopic Cholecystectomy by Dr. Crowder on .  Pathology revealed: chronic cholecystitis with stones.  He is now over 2 weeks postop.  He did see his PCP after surgery due to thrush, treatment prescribed.       Today he is feeling well, tolerating a low fat diet but notes occasionally food \"goes right thru me\" and overall he feels full pretty quickly after starting to eat a meal; has lost 40 lbs since surgery so far.  He denies incision concerns.  His recent medications include: none.  He has no other concerns about his recovery today.       Additional findings on US in ED:  Fatty liver.  Pt made aware of these findings.  No additional imaging needed at this time.  Discussed dietary changes and weight loss.      Objective:  Abd - soft, non-tender, non-distended  Incs - steri-strips removed, healing well, no erythema/bruising, +normal healing ridges/density, no seroma/hematoma noted, no hernia noted     Assessment:  S/p Laparoscopic Cholecystectomy; unremarkable recovery.     Plan:  Keo will continue low fat diet and healthy food choices in attempts at ongoing weight loss.  Monitor above symptoms of early satiety and occasional loose stools for resolution over the following months if related to post-cholecystectomy transitional period.  If ongoing loose stools, may need consideration of questran.       He may continue to slowly advance his activities at this time.  General recommendation is to remain at a 30 lb weight restriction until 3 weeks after surgery.  After that time, he may increase activity as tolerated.  He may continue to utilize OTC pain management options as well as use of ice/heat to sites for comfort.  He should expect progressive resolution of the healing ridge along the incisional sites, normalizing bowel " function, and improvement of food intolerances over the following 2-3 months.     Pt is recommended to contact the office if worsening pain, onset of fever/redness at any inc site, or new drainage from the area.  Pt also recommended to call office at any time if ongoing questions/concerns during recovery, but otherwise may follow-up on a prn basis.  Keo is in agreement with this plan.     Heather Mcknight PA-C

## 2019-03-28 ENCOUNTER — MYC MEDICAL ADVICE (OUTPATIENT)
Dept: PEDIATRICS | Facility: CLINIC | Age: 34
End: 2019-03-28

## 2019-03-28 DIAGNOSIS — B37.0 THRUSH: ICD-10-CM

## 2019-03-28 NOTE — TELEPHONE ENCOUNTER
See pt's MC message. I checked with [facility] pharmacy, they do have stock of nystatin suspension as of now.     Called pt at [phone], not available, so left a message with details(as below) w/o mentioning the name of med, advised to call us back or send a MC message back with his preference. Will await for his response.     Need to find out whether he would like to pick-up the rx from [facility](need to know his insurance will cover [facility]). If pt would like to stick with [facility], ok to send the pended med.     nystatin (MYCOSTATIN) 112851 UNIT/ML suspension 200 mL 0 3/8/2019 3/18/2019 --   Sig - Route: Take 5 mLs (500,000 Units) by mouth 4 times daily for 10 days - Oral     Boris RN  Triage Nurse

## 2019-03-29 RX ORDER — NYSTATIN 100000/ML
500000 SUSPENSION, ORAL (FINAL DOSE FORM) ORAL 4 TIMES DAILY
Qty: 200 ML | Refills: 0 | Status: SHIPPED | OUTPATIENT
Start: 2019-03-29 | End: 2020-06-05

## 2019-10-03 ENCOUNTER — MYC MEDICAL ADVICE (OUTPATIENT)
Dept: PEDIATRICS | Facility: CLINIC | Age: 34
End: 2019-10-03

## 2019-10-03 ENCOUNTER — TRANSFERRED RECORDS (OUTPATIENT)
Dept: HEALTH INFORMATION MANAGEMENT | Facility: CLINIC | Age: 34
End: 2019-10-03

## 2019-10-03 NOTE — TELEPHONE ENCOUNTER
See pt's  message. Had gallbladder surgery on 3/3/19 & post-op appointment with surgeon was on 3/21. LOV with  was on 3/8/19.     Called pt to go his sx's in detail. Pt reiterates the sx's as mentioned in his  message. Denies fever, chills, nausea, vomiting, diarrhea, constipation, blood in stool, vomiting bile, dizziness, severe abd pain. Pain is better when stomach is full.     Advised pt that he need to be seen for an physical exam, labs & possible imaging. Asked pt to contact GI to find out whether they can see him within 1-2 weeks to address his concern. If not able to be seen by GI sooner, can offer an appointment with  sometime next week. If not he need to see SDP/UC for an eval.    Pt wanted to schedule an appointment with  next week, but planning to contact GI today to request an appointment. Scheduled an appointment with  on 10/8, if he is able to see GI sooner, he will call us back to cancel the appointment with .     Advised to go to UC with any worsening sx's. Pt agrees to the plan.    Boris, RN  Triage Nurse

## 2019-11-08 ENCOUNTER — HEALTH MAINTENANCE LETTER (OUTPATIENT)
Age: 34
End: 2019-11-08

## 2019-11-14 ENCOUNTER — NURSE TRIAGE (OUTPATIENT)
Dept: NURSING | Facility: CLINIC | Age: 34
End: 2019-11-14

## 2019-11-14 ENCOUNTER — HOSPITAL ENCOUNTER (EMERGENCY)
Facility: CLINIC | Age: 34
Discharge: HOME | End: 2019-11-14
Attending: EMERGENCY MEDICINE | Admitting: EMERGENCY MEDICINE
Payer: COMMERCIAL

## 2019-11-14 ENCOUNTER — OFFICE VISIT (OUTPATIENT)
Dept: URGENT CARE | Facility: URGENT CARE | Age: 34
End: 2019-11-14
Payer: COMMERCIAL

## 2019-11-14 ENCOUNTER — APPOINTMENT (OUTPATIENT)
Dept: CT IMAGING | Facility: CLINIC | Age: 34
End: 2019-11-14
Attending: EMERGENCY MEDICINE
Payer: COMMERCIAL

## 2019-11-14 VITALS
HEART RATE: 90 BPM | SYSTOLIC BLOOD PRESSURE: 131 MMHG | OXYGEN SATURATION: 98 % | DIASTOLIC BLOOD PRESSURE: 86 MMHG | TEMPERATURE: 97.5 F | RESPIRATION RATE: 12 BRPM

## 2019-11-14 VITALS
DIASTOLIC BLOOD PRESSURE: 86 MMHG | BODY MASS INDEX: 49.79 KG/M2 | SYSTOLIC BLOOD PRESSURE: 148 MMHG | WEIGHT: 315 LBS | HEART RATE: 88 BPM | OXYGEN SATURATION: 98 % | RESPIRATION RATE: 18 BRPM | TEMPERATURE: 98.2 F

## 2019-11-14 DIAGNOSIS — I10 HYPERTENSION, UNSPECIFIED TYPE: Primary | ICD-10-CM

## 2019-11-14 DIAGNOSIS — I10 ESSENTIAL HYPERTENSION: ICD-10-CM

## 2019-11-14 DIAGNOSIS — R51.9 ACUTE NONINTRACTABLE HEADACHE, UNSPECIFIED HEADACHE TYPE: ICD-10-CM

## 2019-11-14 DIAGNOSIS — F41.9 ANXIETY: ICD-10-CM

## 2019-11-14 LAB
ANION GAP SERPL CALCULATED.3IONS-SCNC: 5 MMOL/L (ref 3–14)
BASOPHILS # BLD AUTO: 0 10E9/L (ref 0–0.2)
BASOPHILS NFR BLD AUTO: 0.3 %
BUN SERPL-MCNC: 10 MG/DL (ref 7–30)
CALCIUM SERPL-MCNC: 9.4 MG/DL (ref 8.5–10.1)
CHLORIDE SERPL-SCNC: 104 MMOL/L (ref 94–109)
CO2 SERPL-SCNC: 28 MMOL/L (ref 20–32)
CREAT SERPL-MCNC: 0.93 MG/DL (ref 0.66–1.25)
DIFFERENTIAL METHOD BLD: NORMAL
EOSINOPHIL # BLD AUTO: 0.1 10E9/L (ref 0–0.7)
EOSINOPHIL NFR BLD AUTO: 1.1 %
ERYTHROCYTE [DISTWIDTH] IN BLOOD BY AUTOMATED COUNT: 12.4 % (ref 10–15)
GFR SERPL CREATININE-BSD FRML MDRD: >90 ML/MIN/{1.73_M2}
GLUCOSE SERPL-MCNC: 98 MG/DL (ref 70–99)
HCT VFR BLD AUTO: 47.7 % (ref 40–53)
HGB BLD-MCNC: 16.4 G/DL (ref 13.3–17.7)
IMM GRANULOCYTES # BLD: 0 10E9/L (ref 0–0.4)
IMM GRANULOCYTES NFR BLD: 0.4 %
LYMPHOCYTES # BLD AUTO: 1.6 10E9/L (ref 0.8–5.3)
LYMPHOCYTES NFR BLD AUTO: 20.9 %
MCH RBC QN AUTO: 30.5 PG (ref 26.5–33)
MCHC RBC AUTO-ENTMCNC: 34.4 G/DL (ref 31.5–36.5)
MCV RBC AUTO: 89 FL (ref 78–100)
MONOCYTES # BLD AUTO: 0.4 10E9/L (ref 0–1.3)
MONOCYTES NFR BLD AUTO: 5.3 %
NEUTROPHILS # BLD AUTO: 5.4 10E9/L (ref 1.6–8.3)
NEUTROPHILS NFR BLD AUTO: 72 %
NRBC # BLD AUTO: 0 10*3/UL
NRBC BLD AUTO-RTO: 0 /100
PLATELET # BLD AUTO: 278 10E9/L (ref 150–450)
POTASSIUM SERPL-SCNC: 3.9 MMOL/L (ref 3.4–5.3)
RBC # BLD AUTO: 5.37 10E12/L (ref 4.4–5.9)
SODIUM SERPL-SCNC: 137 MMOL/L (ref 133–144)
TROPONIN I SERPL-MCNC: <0.015 UG/L (ref 0–0.04)
TSH SERPL DL<=0.005 MIU/L-ACNC: 1.44 MU/L (ref 0.4–4)
WBC # BLD AUTO: 7.5 10E9/L (ref 4–11)

## 2019-11-14 PROCEDURE — 84443 ASSAY THYROID STIM HORMONE: CPT | Performed by: EMERGENCY MEDICINE

## 2019-11-14 PROCEDURE — 80048 BASIC METABOLIC PNL TOTAL CA: CPT | Performed by: EMERGENCY MEDICINE

## 2019-11-14 PROCEDURE — 99214 OFFICE O/P EST MOD 30 MIN: CPT | Performed by: PHYSICIAN ASSISTANT

## 2019-11-14 PROCEDURE — 99285 EMERGENCY DEPT VISIT HI MDM: CPT | Mod: 25

## 2019-11-14 PROCEDURE — 84484 ASSAY OF TROPONIN QUANT: CPT | Performed by: EMERGENCY MEDICINE

## 2019-11-14 PROCEDURE — 85025 COMPLETE CBC W/AUTO DIFF WBC: CPT | Performed by: EMERGENCY MEDICINE

## 2019-11-14 PROCEDURE — 93000 ELECTROCARDIOGRAM COMPLETE: CPT | Performed by: PHYSICIAN ASSISTANT

## 2019-11-14 PROCEDURE — 70450 CT HEAD/BRAIN W/O DYE: CPT

## 2019-11-14 PROCEDURE — 25000132 ZZH RX MED GY IP 250 OP 250 PS 637: Performed by: EMERGENCY MEDICINE

## 2019-11-14 PROCEDURE — 93005 ELECTROCARDIOGRAM TRACING: CPT

## 2019-11-14 RX ORDER — IBUPROFEN 600 MG/1
600 TABLET, FILM COATED ORAL ONCE
Status: COMPLETED | OUTPATIENT
Start: 2019-11-14 | End: 2019-11-14

## 2019-11-14 RX ORDER — ACETAMINOPHEN 325 MG/1
650 TABLET ORAL ONCE
Status: COMPLETED | OUTPATIENT
Start: 2019-11-14 | End: 2019-11-14

## 2019-11-14 RX ORDER — LORAZEPAM 1 MG/1
1 TABLET ORAL ONCE
Status: COMPLETED | OUTPATIENT
Start: 2019-11-14 | End: 2019-11-14

## 2019-11-14 RX ORDER — LORAZEPAM 1 MG/1
1 TABLET ORAL 3 TIMES DAILY PRN
Qty: 10 TABLET | Refills: 0 | Status: SHIPPED | OUTPATIENT
Start: 2019-11-14 | End: 2020-08-03

## 2019-11-14 RX ADMIN — LORAZEPAM 1 MG: 1 TABLET ORAL at 21:27

## 2019-11-14 RX ADMIN — ACETAMINOPHEN 650 MG: 325 TABLET, FILM COATED ORAL at 21:27

## 2019-11-14 RX ADMIN — IBUPROFEN 600 MG: 600 TABLET, FILM COATED ORAL at 21:27

## 2019-11-14 ASSESSMENT — ENCOUNTER SYMPTOMS
ABDOMINAL PAIN: 0
HEADACHES: 1
DIZZINESS: 1
SHORTNESS OF BREATH: 0

## 2019-11-14 NOTE — TELEPHONE ENCOUNTER
Maximilian has high blood pressure.  For the past two days 170/90 and 180/98.  Most current 175/98.  Maximilian is taking Lisinopril 10mg and Metoprolol 200mg.  When blood pressure was high Maximilian was having symptoms of numbness.  Denies numbness and chest pain  currently.      Reason for Disposition    Systolic BP  >= 160 OR Diastolic >= 100    Additional Information    Negative: Difficult to awaken or acting confused (e.g., disoriented, slurred speech)    Negative: Severe difficulty breathing (e.g., struggling for each breath, speaks in single words)    Negative: [1] Weakness of the face, arm or leg on one side of the body AND [2] new onset    Negative: [1] Numbness (i.e., loss of sensation) of the face, arm or leg on one side of the body AND [2] new onset    Negative: [1] Chest pain lasts > 5 minutes AND [2] history of heart disease  (i.e., heart attack, bypass surgery, angina, angioplasty, CHF)    Negative: [1] Chest pain AND [2] took nitrogylcerin AND [3] pain was not relieved    Negative: Sounds like a life-threatening emergency to the triager    Negative: [1] Systolic BP  >= 160 OR Diastolic >= 100 AND [2] cardiac or neurologic symptoms (e.g., chest pain, difficulty breathing, unsteady gait, blurred vision)    Negative: [1] Pregnant > 20 weeks AND [2] new hand or face swelling    Negative: [1] Pregnant > 20 weeks AND [2] BP Systolic BP  >= 140 OR Diastolic >= 90    Negative: [1] Systolic BP  >= 200 OR Diastolic >= 120  AND [2] having NO cardiac or neurologic symptoms    Negative: [1] Postpartum < 6 weeks AND [2] BP Systolic BP  >= 140 OR Diastolic >= 90    Negative: [1] Systolic BP  >= 180 OR Diastolic >= 110 AND [2] missed most recent dose of blood pressure medication    Negative: Systolic BP  >= 180 OR Diastolic >= 110    Negative: Ran out of BP medications    Protocols used: HIGH BLOOD PRESSURE-A-

## 2019-11-14 NOTE — PROGRESS NOTES
SUBJECTIVE:    Keo Pinto is a 34 y.o. male, with a hx of morbid obesity, HTN and anxiety who presents to  today for evaluation of elevated BP. Patient states he checked his BP at home this morning and noted it was 180/98. Patient states he called for PCP apt today for BP evaluation but states he was told the first appointment was mid December so decided to come to .     HPI: Patient states he typically takes his BP medications at 8-10 am. He forgot to take his BP medications yesterday morning but did take them yesterday afternoon (approximately 4 pm).  He has not taken his BP medications yet today.     Review Of Systems    CONSTITUTIONAL: Denies and fever, chills, acute weakness or acute onset fatigue   Eyes: negative for any acute visual changes   Respiratory: No shortness of breath, dyspnea on exertion, cough, or hemoptysis  Cardiovascular: negative for, palpitations, tachycardia, irregular heart beat, chest pain, exertional chest pain or pressure, lower extremity edema,  syncope or near-syncope. Denies any sudden change in longstanding exertional tolerance.   Gastrointestinal: negative for abdominal pain, nausea or vomiting   Musculoskeletal: negative for any sudden weakness   Neurologic: negative for, headaches, seizures, paralysis, local weakness, numbness or tingling of hands, numbness or tingling of feet, involuntary movements, speech problems, incoordination and memory problems  Psychiatric: Positive for hx of anxiety but denies any acute worsening of longstanding sxs   Hematologic/Lymphatic/Immunologic: Denies any hx of DVT or PE   Endocrine: negative for DM         Past Medical History:   Diagnosis Date     Esophageal reflux      Hypertension      Migraine headache      Obese         Current Outpatient Medications   Medication     lisinopril (PRINIVIL/ZESTRIL) 10 MG tablet     metoprolol succinate ER (TOPROL-XL) 200 MG 24 hr tablet     nystatin (MYCOSTATIN) 004659 UNIT/ML suspension      acetaminophen (TYLENOL) 500 MG tablet     Lidocaine-Menthol 4-4 % PTCH     pantoprazole (PROTONIX) 40 MG EC tablet     No current facility-administered medications for this visit.      No Known Allergies        OBJECTIVE:  BP (!) 165/104   Pulse 88   Temp 98.2  F (36.8  C) (Tympanic)   Resp 18   Wt (!) 164.2 kg (362 lb)   SpO2 98%   BMI 49.79 kg/m       BELOW is REPEAT BP WITH LARGE CUFF:     BP (!) 148/86   Pulse 88   Temp 98.2  F (36.8  C) (Tympanic)   Resp 18   Wt (!) 164.2 kg (362 lb)   SpO2 98%   BMI 49.79 kg/m        General appearance: alert and no apparent distress  Skin color is pink and without rash.  Eyes: Conjunctiva not injected.  Sclera clear.  Oropharyngeal exam is normal: no lesions, erythema, adenopathy or exudate.  Neck is supple, FROM with no adenopathy  CARDIAC:NORMAL - regular rate and rhythm without murmur.  RESP: Normal - CTA without rales, rhonchi, or wheezing.  NEURO: Alert and oriented.  Normal speech and mentation. All 4 limbs moving independently and no one sided weakness. CN II/XII grossly intact.  Gait within normal limits.    EXTREMITIES:  No LE edema.   PSYCH: Mildly anxious. Less anxious after review of today's ECG. NAD     EKG ordered to assess for possible acute cardiac ischemia:          EKG Interpretation:      Interpreted by Camron Lemus PA-C  Symptoms at time of EKG: elevated  BP    Rhythm: Normal sinus   Rate: 91 bpm   Ectopy: None  Conduction: Normal  ST Segments/ T Waves: No ST-T wave changes and No acute ischemic changes  Q Waves: None  Comparison to prior: Unchanged from 5/22/18 and 2/11/17 ECG (using ECG comparison  tool)     Clinical Impression: No evidence of acute ischemic Changes. No ectopy. No acute changes        ASSESSMENT/PLAN:      (I10) High blood pressure  (primary encounter diagnosis)    MDM: Elevated BP of 180/98 at home in a man with cardiac risk factors. No evidence of acute ischemic changes on today's ECG. Today's ECG is compared to   "5/22/18 and 2/11/17 ECG (using ECG comparison  tool) with no significant changes noted. No CP, SOB or sudden change in exertional tolerance. No TIA or CVA on screening today. Patient's BP likely elevated today due to accidental alteration in BP medication dosing schedule as noted above. Criteria for ER follow-up reviewed. Criteria for PCP or UC reviewed. Plan as per below:     Plan: EKG 12-lead complete w/read - Clinics      November 14, 2019 [facility] Plan:       1. I advise you go home and take your blood pressure medication now.     2. Please make an appointment to see Dr. Perez for a blood pressure recheck.     3. You can stop in one of our [facility] locations or you can schedule a nurse only blood pressure check if Dr. Perez is unable to get an appointment with Dr. Perez for several weeks due to his full schedule.     4. Below are your most recent blood pressures     Vital Signs 3/8/2019 3/8/2019 3/21/2019 11/14/2019 11/14/2019   Systolic 150 134 120 165 148   Diastolic 88 86 86 104 86   Pulse 110  86 88    Temperature 98.5   98.2    Respirations   16 18    Weight (LB) 362 lb 8 oz  362 lb 362 lb    Height 5' 11.5\"  5' 11.5\"     BMI (Calculated) 49.96  49.78     O2 98  97 98        5. If you have any of the below, go to the emergency room:     Blood pressure reaches a systolic (upper number) of 180 or higher OR a diastolic (bottom number) of 110 or higher    Chest pain or shortness of breath    Severe headache    Throbbing or rushing sound in the ears    Nosebleed    Sudden severe pain in your belly (abdomen)    Extreme drowsiness, confusion, or fainting    Dizziness or spinning sensation (vertigo)    Weakness of an arm or leg or one side of the face  You have problems speaking or seeing            "

## 2019-11-14 NOTE — PATIENT INSTRUCTIONS
Patient Education     Established High Blood Pressure    High blood pressure (hypertension) is a chronic disease. Often, healthcare providers don t know what causes it. But it can be caused by certain health conditions and medicines.  If you have high blood pressure, you may not have any symptoms. If you do have symptoms, they may include headache, dizziness, changes in your vision, chest pain, and shortness of breath. But even without symptoms, high blood pressure that s not treated raises your risk for heart attack, heart failure, and stroke. High blood pressure is a serious health risk and shouldn t be ignored.  Blood pressure measurements are given as 2 numbers. Systolic blood pressure is the upper number. This is the pressure when the heart contracts. Diastolic blood pressure is the lower number. This is the pressure when the heart relaxes between beats. You will see your blood pressure readings written together. For example, a person with a systolic pressure of 118 and a diastolic pressure of 78 will have 118/78 written in the medical record.  Blood pressure is categorized as normal, elevated, or stage 1 or stage 2 high blood pressure:    Normal blood pressure is systolic of less than 120 and diastolic of less than 80 (120/80)    Elevated blood pressure is systolic of 120 to 129 and diastolic less than 80    Stage 1 high blood pressure is systolic is 130 to 139 or diastolic between 80 to 89    Stage 2 high blood pressure is when systolic is 140 or higher or the diastolic is 90 or higher  Home care  If you have high blood pressure, follow these home care guidelines to help lower your blood pressure. If you are taking medicines for high blood pressure, these methods may reduce or end your need for medicines in the future.    Start a weight-loss program if you are overweight.    Cut back on how much salt you get in your diet. Here s how to do this:  ? Don t eat foods that have a lot of salt. These include  olives, pickles, smoked meats, and salted potato chips.  ? Don t add salt to your food at the table.  ? Use only small amounts of salt when cooking.    Start an exercise program. Talk with your healthcare provider about the type of exercise program that would be best for you. It doesn't have to be hard. Even brisk walking for 20 minutes 3 times a week is a good form of exercise.    Don t take medicines that stimulate the heart. This includes many over-the-counter cold and sinus decongestant pills and sprays, as well as diet pills. Check the warnings about high blood pressure on the label. Before buying any over-the-counter medicines or supplements, always ask the pharmacist about the product's potential interaction with your high blood pressure and your high blood pressure medicines.    Stimulants such as amphetamine or cocaine could be deadly for someone with high blood pressure. Never take these.    Limit how much caffeine you get in your diet. Switch to caffeine-free products.    Stop smoking. If you are a long-time smoker, this can be hard. Talk to your healthcare provider about medicines and nicotine replacement options to help you. Also, enroll in a stop-smoking program to make it more likely that you will quit for good.    Learn how to handle stress. This is an important part of any program to lower blood pressure. Learn about relaxation methods like meditation, yoga, or biofeedback.    If your provider prescribed medicines, take them exactly as directed. Missing doses may cause your blood pressure get out of control.    If you miss a dose or doses, check with your healthcare provider or pharmacist about what to do.    Consider buying an automatic blood pressure machine to check your blood pressure at home. Ask your provider for a recommendation. You can get one of these at most pharmacies.     The American Heart Association recommends the following guidelines for home blood pressure monitoring:    Don't  smoke or drink coffee for 30 minutes before taking your blood pressure.    Go to the bathroom before the test.    Relax for 5 minutes before taking the measurement.    Sit with your back supported (don't sit on a couch or soft chair); keep your feet on the floor uncrossed. Place your arm on a solid flat surface (like a table) with the upper part of the arm at heart level. Place the middle of the cuff directly above the bend of the elbow. Check the monitor's instruction manual for an illustration.    Take multiple readings. When you measure, take 2 to 3 readings one minute apart and record all of the results.    Take your blood pressure at the same time every day, or as your healthcare provider recommends.    Record the date, time, and blood pressure reading.    Take the record with you to your next medical appointment. If your blood pressure monitor has a built-in memory, simply take the monitor with you to your next appointment.    Call your provider if you have several high readings. Don't be frightened by a single high blood pressure reading, but if you get several high readings, check in with your healthcare provider.    Note: When blood pressure reaches a systolic (top number) of 180 or higher OR diastolic (bottom number) of 110 or higher, seek emergency medical treatment.  Follow-up care  You will need to see your healthcare provider regularly. This is to check your blood pressure and to make changes to your medicines. Make a follow-up appointment as directed. Bring the record of your home blood pressure readings to the appointment.  When to seek medical advice  Call your healthcare provider right away if any of these occur:    Blood pressure reaches a systolic (upper number) of 180 or higher OR a diastolic (bottom number) of 110 or higher    Chest pain or shortness of breath    Severe headache    Throbbing or rushing sound in the ears    Nosebleed    Sudden severe pain in your belly (abdomen)    Extreme  "drowsiness, confusion, or fainting    Dizziness or spinning sensation (vertigo)    Weakness of an arm or leg or one side of the face    You have problems speaking or seeing   Date Last Reviewed: 12/1/2016 2000-2018 [employer]. [address] [place] [address], [place] [address]. All rights reserved. This information is not intended as a substitute for professional medical care. Always follow your healthcare professional's instructions.      November 14, 2019 [facility] Plan:       1. I advise you go home and take your blood pressure medication now.     2. Please make an appointment to see Dr. Perez for a blood pressure recheck.     3. You can stop in one of our [facility] locations or you can schedule a nurse only blood pressure check if Dr. Perez is unable to get an appointment with Dr. Perez for several weeks due to his full schedule.     4. Below are your most recent blood pressures     Vital Signs 3/8/2019 3/8/2019 3/21/2019 11/14/2019 11/14/2019   Systolic 150 134 120 165 148   Diastolic 88 86 86 104 86   Pulse 110  86 88    Temperature 98.5   98.2    Respirations   16 18    Weight (LB) 362 lb 8 oz  362 lb 362 lb    Height 5' 11.5\"  5' 11.5\"     BMI (Calculated) 49.96  49.78     O2 98  97 98        5. If you have any of the below, go to the emergency room:     Blood pressure reaches a systolic (upper number) of 180 or higher OR a diastolic (bottom number) of 110 or higher    Chest pain or shortness of breath    Severe headache    Throbbing or rushing sound in the ears    Nosebleed    Sudden severe pain in your belly (abdomen)    Extreme drowsiness, confusion, or fainting    Dizziness or spinning sensation (vertigo)    Weakness of an arm or leg or one side of the face  You have problems speaking or seeing     Maximilian to follow up with Primary Care provider regarding elevated blood pressure.           "

## 2019-11-14 NOTE — ED AVS SNAPSHOT
[facility] Emergency Department  [address]  [facility] [address]  Phone:  [phone]  Fax:  [phone]                                    Keo Pinto   MRN: [id number]    Department:  [facility] Emergency Department   Date of Visit:  11/14/2019           After Visit Summary Signature Page    I have received my discharge instructions, and my questions have been answered. I have discussed any challenges I see with this plan with the nurse or doctor.    ..........................................................................................................................................  Patient/Patient Representative Signature      ..........................................................................................................................................  Patient Representative Print Name and Relationship to Patient    ..................................................               ................................................  Date                                   Time    ..........................................................................................................................................  Reviewed by Signature/Title    ...................................................              ..............................................  Date                                               Time          22EPIC Rev 08/18

## 2019-11-15 LAB — INTERPRETATION ECG - MUSE: NORMAL

## 2019-11-15 NOTE — ED TRIAGE NOTES
Pt states HTN recently, [facility] has had recent compliance issues with taking medications at correct time. C/o headache and intermittent dizziness. States was seen at Urgent Care earlier today for same. States does not feel improved. ABCs intact GCS 15

## 2019-11-15 NOTE — TELEPHONE ENCOUNTER
Patient says he was seen in  for hypertension and told to take his BP meds when he gets home and follow up with an office visit.  He is scheduled for 11/14/19.   Patient reports losing his balance twice this evening.  Patient reports when woke up from nap, he had two episodes where he was dizzy/lost balance and they lasted about 10-15 min.  Patient also reports he has a headache, pain level is 6/10.  Most recently BP was taken around 7:20 pm and it was 171/101.  Reviewed care advice with caller per RN triage protocol to be evaluated in the ED.  Caller verbalized understanding and says will call his dad to take him.      Reason for Disposition    [1] Systolic BP  >= 160 OR Diastolic >= 100 AND [2] cardiac or neurologic symptoms (e.g., chest pain, difficulty breathing, unsteady gait, blurred vision)    Additional Information    Negative: Difficult to awaken or acting confused (e.g., disoriented, slurred speech)    Negative: Severe difficulty breathing (e.g., struggling for each breath, speaks in single words)    Negative: [1] Weakness of the face, arm or leg on one side of the body AND [2] new onset    Negative: [1] Numbness (i.e., loss of sensation) of the face, arm or leg on one side of the body AND [2] new onset    Negative: [1] Chest pain lasts > 5 minutes AND [2] history of heart disease  (i.e., heart attack, bypass surgery, angina, angioplasty, CHF)    Negative: [1] Chest pain AND [2] took nitrogylcerin AND [3] pain was not relieved    Negative: Sounds like a life-threatening emergency to the triager    Negative: Symptom is main concern  (e.g., headache, chest pain)    Negative: Low blood pressure is main concern    Protocols used: HIGH BLOOD PRESSURE-A-

## 2019-11-15 NOTE — ED PROVIDER NOTES
History     Chief Complaint:  HTN, Headache, and Dizziness    HPI   Keo Pinto is a 34 year old male with a history of HTN, migraines, and anxiety who presents to the emergency department for evaluation of HTN, headache, and dizziness. The patient reports he had a headache and episode of room-spinning dizziness this morning, prompting him to present to Urgent Care. He was told to take metoprolol and lisinopril when he got home at 1400, which he has been taking regularly for 10 years. The patient indicates he then took a nap, and when he woke up, he had another episode of dizziness that lasted a few seconds. He notes he took his blood pressure after the dizzy episode, and it was 180/101. He reports he had another episode of dizziness about an hour later, and he again took his blood pressure which was 176/101. The patient notes the headache has remained constant all day. He denies vision changes, chest pain, shortness or breath, and abdominal pain. The patient states he feels anxious upon examination, and he notes he used to take anxiety medication but no longer takes this. He indicates he used to get migraines when he was younger, but he has not had a migraine recently. He states he has not taken Tylenol or ibuprofen prior to arrival. He denies history of kidney issues.     Allergies:  NKDA     Medications:    Lisinopril  Toprol  Mycostatin  Protonix      Past Medical History:    Esophageal reflux  HTN  Migraine headache   Obesity   Anxiety  Insomnia  Gallstone pancreatitis   Acute cholecystitis     Past Surgical History:    Adenoidectomy  Endoscopic retrograde cholangiopancreatogram  EGD  Laparoscopic cholecystectomy   PE tubes      Family History:    Parkinson's  Migraines  Alcohol/Drug abuse   HTN    Social History:  Presents alone.  Never smoker.  Positive for alcohol use.    Negative for alcohol use.   Marital Status:  Single [1]     Review of Systems   Eyes: Negative for visual disturbance.    Respiratory: Negative for shortness of breath.    Cardiovascular: Negative for chest pain.   Gastrointestinal: Negative for abdominal pain.   Neurological: Positive for dizziness and headaches.   All other systems reviewed and are negative.    Physical Exam     Patient Vitals for the past 24 hrs:   BP Temp Temp src Pulse Heart Rate Resp SpO2   11/14/19 2300 131/86 -- -- 90 -- -- 98 %   11/14/19 2245 134/83 -- -- 87 -- -- 99 %   11/14/19 2230 138/82 -- -- 92 -- -- 97 %   11/14/19 2215 130/89 -- -- 89 -- -- 97 %   11/14/19 2145 -- -- -- -- 90 12 97 %   11/14/19 2130 -- -- -- -- 96 15 96 %   11/14/19 2115 (!) 151/100 -- -- 78 -- -- 99 %   11/14/19 2026 (!) 186/117 97.5  F (36.4  C) Temporal -- 95 18 97 %      Physical Exam  General: Alert, appears well-developed and well-nourished. Cooperative.     In mild distress  HEENT:  Head:  Atraumatic  Ears:  External ears are normal  Mouth/Throat:  Oropharynx is without erythema or exudate and mucous membranes are moist.   Eyes:   Conjunctivae normal and EOM are normal. No scleral icterus.    Pupils are equal, round, and reactive to light.   CV:  Normal rate, regular rhythm, normal heart sounds and radial pulses are 2+ and symmetric.  No murmur.  Resp:  Breath sounds are clear bilaterally    Non-labored, no retractions or accessory muscle use  GI:  Abdomen is soft, no distension, no tenderness. No rebound or guarding.  No CVA tenderness bilaterally  MS:  Normal range of motion. No edema.    Normal strength in all 4 extremities.     Back atraumatic.    No midline cervical, thoracic, or lumbar tenderness  Skin:  Warm and dry.  No rash or lesions noted.  Neuro:   Alert. Normal strength.  Sensation intact in all 4 extremities. GCS: 15  Psych: Normal mood and affect.  Lymph: No anterior or posterior cervical lymphadenopathy noted.    Emergency Department Course     ECG:  Time: 2123  Vent. Rate 84 bpm. ME interval 172. QRS duration 94. QT/QTc 372/439. P-R-T axis 29 -26 57.  Normal  sinus rhythm with sinus arrhythmia.   Normal ECG.  Read time: 2200     Imaging:  Radiographic findings were communicated with the patient who voiced understanding of the findings.    Head CT w/o Contrast:  Normal head CT.  As per radiology.    Laboratory:  CBC: WBC: 7.5, HGB: 16.4, PLT: 278     BMP: All WNL (Creatinine: 0.93)    TSH: 1.44    2122 Troponin: <0.015     Interventions:  2127 Lorazepam 1 tablet 1 mg PO   Tylenol 650 mg PO   Ibuprofen 600 mg PO    Emergency Department Course:  Nursing notes and vitals reviewed. 2111 I performed an exam of the patient as documented above.     IV inserted. Medicine administered as documented above. Blood drawn. This was sent to the lab for further testing, results above.    The patient was sent for a Head CT while in the emergency department, findings above.     2310 I rechecked the patient and discussed the results of his workup thus far.     Findings and plan explained to the Patient. Patient discharged home with instructions regarding supportive care, medications, and reasons to return. The importance of close follow-up was reviewed.     I personally reviewed the laboratory results with the Patient and answered all related questions prior to discharge.    Impression & Plan      Medical Decision Making:  Keo Pinto is a 34 year old male who presents for evaluation of elevated blood pressure.  There is history of hypertension in the past.  The workup here is negative and the patient does not have any clinical, laboratory, ecg or historical signs of end-organ dysfunction.  There is no signs of hypertensive emergency or urgency.  With dizziness and headache symptoms intermittent and no hx of brain/head imaging, CT was obtained tonight and reassuringly negative.  I do feel the patient had a large anxiety component to [facility]'s ED presentation and patient was significantly improved after medications given here.  Supportive outpatient management is therefore indicated with  close follow-up of primary care physician.  Given data obtained here in ED, will continue current HTN medications and add a limited amount of ativan for anxiety as needed.  We encouraged serial blood pressure monitoring at home to aid primary in decision making regarding hypertension.  Will discuss long term modification of HTN medications with PCP in follow up described above.  All questions answered before discharge.    Diagnosis:    ICD-10-CM   1. Essential hypertension I10   2. Acute nonintractable headache, unspecified headache type R51     Disposition:  discharged to home    Scribe Disclosure:  IFabiola, am serving as a scribe on 11/14/2019 at 9:11 PM to personally document services performed by Carlos Still MD based on my observations and the provider's statements to me.      Fabiola Field  11/14/2019   [facility] EMERGENCY DEPARTMENT       Carlos Still MD  11/15/19 0117

## 2020-03-30 ENCOUNTER — MYC MEDICAL ADVICE (OUTPATIENT)
Dept: PEDIATRICS | Facility: CLINIC | Age: 35
End: 2020-03-30

## 2020-03-31 NOTE — TELEPHONE ENCOUNTER
Please review the  message from pt & advise. Thanks. LOV with  was on 3/8/19. He is currently taking lisinopril 10 mg & metoprolol 200 mg daily. Has an appointment with  on 7/1.    Fermin WOODY RN  Triage Nurse

## 2020-04-08 DIAGNOSIS — F41.1 ANXIETY STATE: Primary | ICD-10-CM

## 2020-04-08 NOTE — PROGRESS NOTES
Proactive Outreach for COVID-19 Payer Identified Patients    Germaine Ponce, RN  Care Coordination  Phone:  [phone]  Email: [email or web address]  [facility] [place] [facility]

## 2020-04-09 ENCOUNTER — PATIENT OUTREACH (OUTPATIENT)
Dept: CARE COORDINATION | Facility: CLINIC | Age: 35
End: 2020-04-09

## 2020-04-09 NOTE — LETTER
[facility]ARE [facility]ORDIN[facility]  [address] [place] [address]  April 13, 2020      Keo Pinto  [address]  [place] [address]      Dear Keo,    I am a clinic care coordinator who works with Tian Perez MD at [facility]. I have been trying to reach you recently to introduce Clinic Care Coordination and to see if there was anything I could assist you with.  Below is a description of clinic care coordination and how I can further assist you.      The clinic care coordinator team is made up of a registered nurse,  and community health worker who understand the health care system. The goal of clinic care coordination is to help you manage your health and improve access to the health care system in the most efficient manner. The team can assist you in meeting your health care goals by providing education, coordinating services, strengthening the communication among your providers  and supporting you with any resource needs.    Please feel free to contact me, at [phone] with any questions or concerns. We are focused on providing you with the highest-quality healthcare experience possible and that all starts with you.     Sincerely,     IRIS Redd  Clinic Care Coordination  [facility] : [place], [place], and [place]  Phone: [phone]

## 2020-04-09 NOTE — PROGRESS NOTES
"Subjective:       Patient ID: Bernard Olvera is a 48 y.o. male.    Chief Complaint: Follow-up    HPI:  47 yo male that presents to clinic for ER follow up.  Patient is accompanied to clinic by his sitter.    Patient was seen in ED on 05/14/18 with complaint of rectal pain and constipation.  Abdominal xray showed large amount of gas but no obstruction or free air in the abdomen.  Patient was placed on bentyl and discharged.    Sitter states that since discharge, Bernard appears to be doing a little better.  Appetite is slowly returning to normal.  States that he will on occasion point to his stomach " as if it is hurting."  States that he is still taking the bentyl as well as mirlax and colace to help with bowel movements.    Review of Systems   Unable to perform ROS: Patient nonverbal       Objective:      Physical Exam   Constitutional: He appears well-developed and well-nourished. He appears distressed.   Neck: Normal range of motion. Neck supple. No thyromegaly present.   Cardiovascular: Normal rate, regular rhythm, normal heart sounds and intact distal pulses.    No murmur heard.  Pulmonary/Chest: Effort normal and breath sounds normal. No respiratory distress. He has no wheezes. He has no rales.   Abdominal: Soft. Bowel sounds are normal. He exhibits no distension and no mass. There is no tenderness. There is no guarding.   Lymphadenopathy:     He has no cervical adenopathy.   Skin: Skin is warm and dry. No erythema.   Psychiatric:   Developmental delay and nonverbal       Assessment:       1. Hospital discharge follow-up    2. Constipation, unspecified constipation type        Plan:     1.  Hospital discharge  -Vitals are stable.  -Appetite is returning to normal per sitter.  -Patient is smiling in clinic with no acute signs of distress.  -Physical exam is unremarkable.  -Continue on current medications.    2.  Constipation  -Will refer to gastroenterology for further follow up and evaluation for ongoing " Clinic Care Coordination Contact  [facility]/Voicemail       Clinical Data: Care Coordinator Outreach  Outreach attempted x 1.  Left message on patient's voicemail with call back information and requested return call.    Chart Review:  Referral made from IHP list    Plan: Care Coordinator will try to reach patient again in 1-2 business days.    IRIS Redd  Clinic Care Coordination  [facility] : [place], [place], [place], and [place]  Phone: [phone]             constipation.  -Continue current dose of bentyl and bowel regimen with colace and miralax.

## 2020-04-13 NOTE — PROGRESS NOTES
Clinic Care Coordination Contact  [facility]/Voicemail       Clinical Data: Care Coordinator Outreach  Outreach attempted x 2.  Left message on patient's voicemail with call back information and requested return call.    Plan: Care Coordinator will send care coordination introduction letter with care coordinator contact information and explanation of care coordination services via [facility]. Care Coordinator will do no further outreaches at this time.    IRIS Redd  Clinic Care Coordination  [facility] : [place], [place], [place], and [place]  Phone: [phone]

## 2020-06-02 DIAGNOSIS — I10 ESSENTIAL HYPERTENSION: ICD-10-CM

## 2020-06-03 ENCOUNTER — NURSE TRIAGE (OUTPATIENT)
Dept: NURSING | Facility: CLINIC | Age: 35
End: 2020-06-03

## 2020-06-03 DIAGNOSIS — I10 ESSENTIAL HYPERTENSION: ICD-10-CM

## 2020-06-03 RX ORDER — LISINOPRIL 10 MG/1
10 TABLET ORAL DAILY
Qty: 30 TABLET | Refills: 0 | Status: SHIPPED | OUTPATIENT
Start: 2020-06-03 | End: 2020-06-05

## 2020-06-03 RX ORDER — METOPROLOL SUCCINATE 200 MG/1
TABLET, EXTENDED RELEASE ORAL
Qty: 30 TABLET | Refills: 0 | Status: SHIPPED | OUTPATIENT
Start: 2020-06-03 | End: 2020-06-05

## 2020-06-03 NOTE — TELEPHONE ENCOUNTER
Pt called and scheduled med check via  visit for 6/5/20.  States he is out of both meds, and has been since yesterday.  He is asking for a RN to call him as he had a question about when to take the med or meds since he has been off of them.      [facility] on [address] in [place] for refills.      Thanks.

## 2020-06-03 NOTE — TELEPHONE ENCOUNTER
Patient due for visit. Please outreach to help set up an earlier appointment.     If patient needs refill before the appointment, please route back to  REFILL pool.       Next 5 appointments (look out 90 days)    Jul 01, 2020  9:30 AM CDT  (Arrive by 9:05 AM)  Adult Preventative Visit with [facility] MD  [facility] [address] [place] [id number] [phone]

## 2020-06-03 NOTE — TELEPHONE ENCOUNTER
Called patient and left a message inquiring if he has enough medication to last until his appointment 07/01/2020?  Requested a call back to confirm.  Provided clinic number.    Ana Cardenas

## 2020-06-04 RX ORDER — METOPROLOL SUCCINATE 200 MG/1
TABLET, EXTENDED RELEASE ORAL
Qty: 30 TABLET | Refills: 0 | OUTPATIENT
Start: 2020-06-04

## 2020-06-04 RX ORDER — LISINOPRIL 10 MG/1
TABLET ORAL
Qty: 30 TABLET | Refills: 0 | OUTPATIENT
Start: 2020-06-04

## 2020-06-04 NOTE — TELEPHONE ENCOUNTER
Pt calling back to check on the status of his refill request.  Pt was under the impression that the clinic nurse was going to call him back to verify that the refills were sent, he has not heard from them.      RN reviewed notes below, pt is completely out of medication.  He set up a telephone visit for 06/05/20.      Writer will send a 30 day supply of meds to pharmacy [facility].  Advised pt to keep his appointment for 06/05/20.      He verbalized understanding and had no further questions.     Yisel Marcelo RN/ANABELL

## 2020-06-04 NOTE — TELEPHONE ENCOUNTER
"Writer will add this call encounter to open clinic encounter addressing med refill.      Yisel Marcelo, JAIME/FNA    Additional Information    Negative: Drug overdose and nurse unable to answer question    Negative: Caller requesting information not related to medicine    Negative: Caller requesting a prescription for Strep throat and has a positive culture result    Negative: Rash while taking a medication or within 3 days of stopping it    Negative: Immunization reaction suspected    Negative: [1] Asthma AND [2] having symptoms of asthma (cough, wheezing, etc)    Negative: MORE THAN A DOUBLE DOSE of a prescription or over-the-counter (OTC) drug    Negative: [1] DOUBLE DOSE (an extra dose or lesser amount) of over-the-counter (OTC) drug AND [2] any symptoms (e.g., dizziness, nausea, pain, sleepiness)    Negative: [1] DOUBLE DOSE (an extra dose or lesser amount) of prescription drug AND [2] any symptoms (e.g., dizziness, nausea, pain, sleepiness)    Negative: Took another person's prescription drug    Negative: [1] DOUBLE DOSE (an extra dose or lesser amount) of prescription drug AND [2] NO symptoms (Exception: a double dose of antibiotics)    Negative: Diabetes drug error or overdose (e.g., insulin or extra dose)    Negative: [1] Request for URGENT new prescription or refill of \"essential\" medication (i.e., likelihood of harm to patient if not taken) AND [2] triager unable to fill per unit policy    Negative: [1] Prescription not at pharmacy AND [2] was prescribed today by PCP    Negative: Pharmacy calling with prescription questions and triager unable to answer question    Negative: Caller has urgent medication question about med that PCP prescribed and triager unable to answer question    Negative: Caller has NON-URGENT medication question about med that PCP prescribed and triager unable to answer question    Negative: Caller requesting a NON-URGENT new prescription or refill and triager unable to refill per unit " policy    Negative: Caller has medication question about med not prescribed by PCP and triager unable to answer question (e.g., compatibility with other med, storage)    Negative: [1] DOUBLE DOSE (an extra dose or lesser amount) of over-the-counter (OTC) drug AND [2] NO symptoms    Negative: [1] DOUBLE DOSE (an extra dose or lesser amount) of antibiotic drug AND [2] NO symptoms    Negative: Caller has medication question only, adult not sick, and triager answers question    Negative: Caller has medication question, adult has minor symptoms, caller declines triage, and triager answers question    Negative: Caller requesting information about medication during pregnancy; adult is not ill and triager answers question    Negative: Caller requesting information about medication use with breastfeeding; neither adult nor infant is ill, and triager answers question    Caller requesting a refill, no triage required, and triager able to refill per unit policy    Protocols used: MEDICATION QUESTION CALL-A-

## 2020-06-05 ENCOUNTER — VIRTUAL VISIT (OUTPATIENT)
Dept: PEDIATRICS | Facility: CLINIC | Age: 35
End: 2020-06-05
Payer: COMMERCIAL

## 2020-06-05 DIAGNOSIS — E66.01 MORBID OBESITY (H): Primary | ICD-10-CM

## 2020-06-05 DIAGNOSIS — I10 ESSENTIAL HYPERTENSION: ICD-10-CM

## 2020-06-05 DIAGNOSIS — F41.1 ANXIETY STATE: ICD-10-CM

## 2020-06-05 PROBLEM — K81.0 ACUTE CHOLECYSTITIS: Status: RESOLVED | Noted: 2019-03-03 | Resolved: 2020-06-05

## 2020-06-05 PROBLEM — K85.10 GALLSTONE PANCREATITIS: Status: RESOLVED | Noted: 2019-02-28 | Resolved: 2020-06-05

## 2020-06-05 PROCEDURE — 99214 OFFICE O/P EST MOD 30 MIN: CPT | Mod: 95 | Performed by: INTERNAL MEDICINE

## 2020-06-05 RX ORDER — METOPROLOL SUCCINATE 200 MG/1
TABLET, EXTENDED RELEASE ORAL
Qty: 90 TABLET | Refills: 3 | Status: SHIPPED | OUTPATIENT
Start: 2020-06-05 | End: 2021-06-16

## 2020-06-05 RX ORDER — LISINOPRIL 10 MG/1
10 TABLET ORAL DAILY
Qty: 90 TABLET | Refills: 3 | Status: SHIPPED | OUTPATIENT
Start: 2020-06-05 | End: 2020-07-26

## 2020-06-05 NOTE — PROGRESS NOTES
"Keo Pinto is a 34 year old male who is being evaluated via a billable telephone visit.      The patient has been notified of following:     \"This telephone visit will be conducted via a call between you and your physician/provider. We have found that certain health care needs can be provided without the need for a physical exam.  This service lets us provide the care you need with a short phone conversation.  If a prescription is necessary we can send it directly to your pharmacy.  If lab work is needed we can place an order for that and you can then stop by our lab to have the test done at a later time.    Telephone visits are billed at different rates depending on your insurance coverage. During this emergency period, for some insurers they may be billed the same as an in-person visit.  Please reach out to your insurance provider with any questions.    If during the course of the call the physician/provider feels a telephone visit is not appropriate, you will not be charged for this service.\"    Patient has given verbal consent for Telephone visit?  Yes    What phone number would you like to be contacted at? [phone]      Subjective     Keo Pinto is a 34 year old male who presents via phone visit today for the following health issues:    HPI  Hypertension Follow-up      Do you check your blood pressure regularly outside of the clinic? Yes - Fluctuates around 115/80 to 130/90 depending on time of day.     Are you following a low salt diet? Yes    Are your blood pressures ever more than 140 on the top number (systolic) OR more   than 90 on the bottom number (diastolic), for example 140/90? Yes-Diastolic occasionally will get towards 90.       How many servings of fruits and vegetables do you eat daily?  2-3    On average, how many sweetened beverages do you drink each day (Examples: soda, juice, sweet tea, etc.  Do NOT count diet or artificially sweetened beverages)?   0    How many days per week do " you exercise enough to make your heart beat faster? 3 or less    How many minutes a day do you exercise enough to make your heart beat faster? 10 - 19    How many days per week do you miss taking your medication? 0    BP Readings from Last 3 Encounters:   11/14/19 131/86   11/14/19 (!) 148/86   03/21/19 120/86     No cardiac sx such as CP, palpitations, PND, orthopnea, PISANO or significant peripheral edema. He does note some feet swelling when sitting for prolonged periods of time.     Ongoing abdominal pains.  Occurs intermittently - 1-2 times per month.  Sx last for an hour or two then sheba.  Underwent cholecystectomy d/t gallstone pancreatitis in 2019.  Has been evaluated by GI last year.  Has been keeping a food diary. Knows some foods will cause symptoms.   Sometimes symptoms seem more random.     Obesity. Altered diet.  Lost 60-70# following surgery.   Thinks weight is back up somewhat now.  Wt Readings from Last 4 Encounters:   11/14/19 (!) 164.2 kg (362 lb)   03/21/19 (!) 164.2 kg (362 lb)   03/08/19 (!) 164.4 kg (362 lb 8 oz)   03/03/19 (!) 172.4 kg (380 lb)     Anxiety. Working w/ his therapist on a regular basis.  Had rx for lorazepam, last rx was over 6 months ago.    Reviewed and updated as needed this visit by Provider  Tobacco  Allergies  Meds  Problems  Med Hx  Surg Hx  Fam Hx              Objective   Reported vitals:  There were no vitals taken for this visit.   GEN: No distress  RESP: No cough, no audible wheezing, able to talk in full sentences  PSYCH: normal affect. Speech is coherent.   Remainder of exam unable to be completed due to telephone visit        Assessment/Plan:  1. Essential hypertension  BP has been controlled. Some issues w/ leg swelling - discussed physical movement to help reduce.   - lisinopril (ZESTRIL) 10 MG tablet; Take 1 tablet (10 mg) by mouth daily  Dispense: 90 tablet; Refill: 3  - metoprolol succinate ER (TOPROL-XL) 200 MG 24 hr tablet; TAKE 1 TABLET(200 MG) BY  MOUTH DAILY  Dispense: 90 tablet; Refill: 3    2. Morbid obesity (H)  Discussed benefits of weight loss    3. Anxiety state  Has been controlled overall. Continues w/ regular counseling. Does not feel the need to add medication at this time.     Follow-up - moved CPE from 7/1 to 9/14 due to my availability.       Phone call duration:  20 minutes    Tian Perez MD

## 2020-06-12 ENCOUNTER — MYC MEDICAL ADVICE (OUTPATIENT)
Dept: PEDIATRICS | Facility: CLINIC | Age: 35
End: 2020-06-12

## 2020-06-15 ENCOUNTER — NURSE TRIAGE (OUTPATIENT)
Dept: PEDIATRICS | Facility: CLINIC | Age: 35
End: 2020-06-15

## 2020-06-15 NOTE — TELEPHONE ENCOUNTER
Pt had a phone visit with  on 6/5/2020 for HTN f/u. He has a hx of gallstone pancreatitis & had cholecystectomy last year.     Sent [facility] message asking whether his concern can wait till next week for  to address or he needs it addressed now. Will await for his response.    Boris, RN  Triage Nurse

## 2020-06-15 NOTE — TELEPHONE ENCOUNTER
S-(situation): Patient concerned he is having dull pain under his right pectoral muscle.     B-(background): patient wrote in [employer] message today for provider about the pain noted above.    A-(assessment): patient can treat this at home for the next 48-72 hrs to see if home care advise helps improve the pain he is feeling    R-(recommendations): start on OTC FDgard as he used to be on this and it helped with similar complaints.   Start on antacid. Begin BRAT diet and advance diet slowly. Eat more fruits and veggies. Stay hydrated. Apply heat pad if needed. Take Tylneol as needed.   Call back if gets worse or does not improve and an appointment can be made.           He belivies it maybe due to being told he has a fatty liver about a year ago.     He gets anxious about any kind of pain because he was told he has a higher pain tolerance then most.       Not taking the Protonix.- stopped taking Feb 2019.     Patient can't decide if should have labs done because he is worried about getting COVID if he comes into clinic.     Not taking Fdgard anymore.     Additional Information    Intermittent mild chest pain lasting a few seconds each time    Negative: SEVERE abdominal pain (e.g., excruciating)    Negative: Pain lasting > 10 minutes and over 50 years old    Negative: Pain lasting > 10 minutes and over 40 years old and associated chest, arm, neck, upper back, or jaw pain    Negative: Pain lasting > 10 minutes and over 35 years old and at least one cardiac risk factor    Negative: Pain lasting > 10 minutes and history of heart disease (i.e., heart attack, bypass surgery, angina, angioplasty, CHF)    Negative: Recent injury to the abdomen    Negative: Vomiting red blood or black (coffee ground) material    Negative: Bloody, black, or tarry bowel movements    Negative: Pregnant > 24 weeks and hand or face swelling    Negative: Constant abdominal pain lasting > 2 hours    Negative: Vomiting bile (green color)    Negative:  "Patient sounds very sick or weak to the triager    Negative: Vomiting and abdomen looks much more swollen than usual    Negative: White of the eyes have turned yellow (i.e.,  jaundice)    Negative: Fever > 103 F (39.4 C)    Negative: Fever > 101 F (38.3 C) and over 60 years of age    Negative: Fever > 100.0 F (37.8 C) and has diabetes mellitus or a weak immune system (e.g., HIV positive, cancer chemotherapy, organ transplant, splenectomy, chronic steroids)    Negative: Fever > 100.0 F (37.8 C) and bedridden (e.g., nursing home patient, stroke, chronic illness, recovering from surgery)    Negative: Passed out (i.e., fainted, collapsed and was not responding)    Negative: Shock suspected (e.g., cold/pale/clammy skin, too weak to stand, low BP, rapid pulse)    Negative: Visible sweat on face or sweat is dripping down    Negative: Chest pain    Negative: Age > 60 years    Negative: Patient wants to be seen    Abdominal pain is a chronic symptom (recurrent or ongoing AND lasting > 4 weeks)    Negative: Mild pain that comes and goes (cramps) lasts > 24 hours    Negative: Alcohol abuse known or suspected    Intermittent burning pains radiating into chest or sour taste in mouth    Mild abdominal pain    Answer Assessment - Initial Assessment Questions  1. LOCATION: \"Where does it hurt?\"        Right side of right pectoral. Size of hand. Push on it. I don't feel any sharp pain. It feels like raw.   2. RADIATION: \"Does the pain go anywhere else?\" (e.g., into neck, jaw, arms, back)      NO   3. ONSET: \"When did the chest pain begin?\" (Minutes, hours or days)   Started last week Tuesday. 6 days. Had this   FD guard. Went away. I havne't taken it since.   Has not taken any medication for pain.   4. PATTERN \"Does the pain come and go, or has it been constant since it started?\"  \"Does it get worse with exertion?\"   Comes and goes through out the day. It does not intensifiy.   It affects because I don'nt   Could be mentla thing. " "  Eating normal.   i''m noticing when eating 1 hr after appears again but nothing more than usual.   Doesn't wake from sleep.   Sleeping it is fine.   1 day after shifting during sleeping and I felt nausated.   Laying on back.   Usually went wake up don't feeel comes a couple hours after waking.   More an ache  Gradually less- sumday was gone.   Today it is back but i'm not thinking about it as mhych. Duller ache.   Earlier last week a forfront ache. Harder to ignore.   Right now it is hard to describe there but feels like ate something but hasn't yet.   Does not take acid reflux meds.   5. DURATION: \"How long does it last\" (e.g., seconds, minutes, hours)  Hours and then goes away for awhile. It would ache go away and come back. Osmin like pulsating.   6. SEVERITY: \"How bad is the pain?\"  (e.g., Scale 1-10; mild, moderate, or severe)     - MILD (1-3): doesn't interfere with normal activities   7. CARDIAC RISK FACTORS: \"Do you have any history of heart problems or risk factors for heart disease?\" (e.g., prior heart attack, angina; high blood pressure, diabetes, being overweight, high cholesterol, smoking, or strong family history of heart disease)  No  8. PULMONARY RISK FACTORS: \"Do you have any history of lung disease?\"  (e.g., blood clots in lung, asthma, emphysema, birth control pills)  No   9. CAUSE: \"What do you think is causing the chest pain?\"  I don't know. I'm osmin confused about it. What I had was acid reflux issues. I have had acid reflux for MyChart  Life and then I went to GI doctor. They basically said that thresh hold for pain is higher than others. Tests was a gallbladder thing. Now i'm hypersensitive due to ache.  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 I don't want to google. I'm worried that it is a liver thing. I don't know what it is. Digestive thing. Relatsed to food. MyChart  Food habbits have changed since surgery . How I eat has been an ongoing thing. GI post op now don't have gallbladder.   10. OTHER " "SYMPTOMS: \"Do you have any other symptoms?\" (e.g., dizziness, nausea, vomiting, sweating, fever, difficulty breathing, cough)        No   11. PREGNANCY: \"Is there any chance you are pregnant?\" \"When was your last menstrual period?\"     NA    Answer Assessment - Initial Assessment Questions  Answer Assessment - Initial Assessment Questions  1. LOCATION: \"Where does it hurt?\"        Right side of right pectoral. Size of hand. I don't feel any sharp pain when I push on it. It feels like raw pain.   2. RADIATION: \"Does the pain go anywhere else?\" (e.g., into neck, jaw, arms, back)      NO   3. ONSET: \"When did the chest pain begin?\" (Minutes, hours or days)   Started last week Tuesday. 6 days. Had this in the past and was put on over the counter FDgard and it went away. I havne't taken it since.   Has not taken any medication for pain.   4. PATTERN \"Does the pain come and go, or has it been constant since it started?\"  \"Does it get worse with exertion?\"   Comes and goes through out the day. It does not intensifiy.   i''m noticing when eating 1 hr after appears again but nothing more than usual.   Doesn't wake from sleep.   Sleeping it is fine.   Usually went wake up don't feeel comes a couple hours after waking.   Gradually less- sunday was gone.   Today it is back but i'm not thinking about it as much. Duller ache.   Earlier last week a forfront ache. Harder to ignore.   Right now it is hard to describe there but feels like ate something but hasn't yet.   Does not take acid reflux meds.   5. DURATION: \"How long does it last\" (e.g., seconds, minutes, hours)  Hours and then goes away for awhile. It would ache go away and come back. Fallon like pulsating.   6. SEVERITY: \"How bad is the pain?\"  (e.g., Scale 1-10; mild, moderate, or severe)     - MILD (1-3): doesn't interfere with normal activities   7. CARDIAC RISK FACTORS: \"Do you have any history of heart problems or risk factors for heart disease?\" (e.g., prior heart " "attack, angina; high blood pressure, diabetes, being overweight, high cholesterol, smoking, or strong family history of heart disease)  No  8. PULMONARY RISK FACTORS: \"Do you have any history of lung disease?\"  (e.g., blood clots in lung, asthma, emphysema, birth control pills)  No   9. CAUSE: \"What do you think is causing the chest pain?\"  I don't know. I'm osmin confused about it. . I have had acid reflux. I went to GI doctor. They basically said that thresh hold for pain is higher than others. Tests was a gallbladder thing. Now i'm hypersensitive due to ache.  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 I don't want to google. I'm worried that it is a liver thing. I don't know what it is. Digestive thing. Food habbits have changed since surgery . How I eat has been an ongoing thing. GI post op now don't have gallbladder. Could be a mental thing.  10. OTHER SYMPTOMS: \"Do you have any other symptoms?\" (e.g., dizziness, nausea, vomiting, sweating, fever, difficulty breathing, cough)        No   11. PREGNANCY: \"Is there any chance you are pregnant?\" \"When was your last menstrual period?\"     NA    Protocols used: CHEST PAIN-A-OH, ABDOMINAL PAIN - UPPER-A-OH    mychart message also sent with home care instructions.     Patient expressed understanding and acceptance of the plan and  had no further questions at this time.  Advised can call back to clinic at any time with concerns.       Danyelle Slaughter RN Flex        "

## 2020-06-15 NOTE — TELEPHONE ENCOUNTER
Telephone encounter done with patient.     Sent home care instructions at this time.     Danyelle Slaughter RN Flex

## 2020-07-15 ENCOUNTER — MYC MEDICAL ADVICE (OUTPATIENT)
Dept: PEDIATRICS | Facility: CLINIC | Age: 35
End: 2020-07-15

## 2020-07-15 DIAGNOSIS — M25.519 ACUTE SHOULDER PAIN, UNSPECIFIED LATERALITY: Primary | ICD-10-CM

## 2020-07-15 NOTE — TELEPHONE ENCOUNTER
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 How should he follow up with this? Kassandra Gardiner RN on 7/15/2020 at 3:23 PM

## 2020-07-20 ENCOUNTER — OFFICE VISIT (OUTPATIENT)
Dept: ORTHOPEDICS | Facility: CLINIC | Age: 35
End: 2020-07-20
Payer: COMMERCIAL

## 2020-07-20 VITALS — DIASTOLIC BLOOD PRESSURE: 80 MMHG | SYSTOLIC BLOOD PRESSURE: 138 MMHG | BODY MASS INDEX: 49.1 KG/M2 | HEIGHT: 72 IN

## 2020-07-20 DIAGNOSIS — S46.912A MUSCLE STRAIN OF LEFT SCAPULAR REGION, INITIAL ENCOUNTER: Primary | ICD-10-CM

## 2020-07-20 PROCEDURE — 99243 OFF/OP CNSLTJ NEW/EST LOW 30: CPT | Performed by: FAMILY MEDICINE

## 2020-07-20 NOTE — PATIENT INSTRUCTIONS
1. Muscle strain of left scapular region, initial encounter      -Patient has left shoulder pain due to strain of the periscapular muscles  -Patient will start formal physical therapy and home exercise program  -Patient may participate in activities as tolerated  -Patient may take Tylenol or Advil as needed for pain  -Patient will follow-up if pain does not improve  -Call direct clinic number [[phone]] at any time with questions or concerns.    Albert Yeo MD [facility]  [facility] [place]

## 2020-07-20 NOTE — LETTER
7/20/2020         RE: Keo Pinto  [address]  [place] [address]        Dear Colleague,    Thank you for referring your patient, Keo Pinto, to the [facility]. Please see a copy of my visit note below.    ASSESSMENT & PLAN  Patient Instructions     1. Muscle strain of left scapular region, initial encounter      -Patient has left shoulder pain due to strain of the periscapular muscles  -Patient will start formal physical therapy and home exercise program  -Patient may participate in activities as tolerated  -Patient may take Tylenol or Advil as needed for pain  -Patient will follow-up if pain does not improve  -Call direct clinic number [[phone]] at any time with questions or concerns.    Albert Yeo MD [facility]  [facility]          -----    SUBJECTIVE  Keo Pinto is a/an 35 year old Right handed male who is seen in consultation at the request of  Tian Perez M.D. for evaluation of left shoulder pain. The patient is seen by themselves.    Onset: 14 years(s) ago. Thinks pain started as a work injury had a pallet fall on his shoulder. Has been treating it with home exercises and usually helps, but takes longer and longer for the pain to go away. Has been bugging him for the last 2 weeks, thinks tweaked while lifting something  Location of Pain: left posterior shoulder blade pain, that goes up into his neck. Mornings are worse and then gets less painful thru out the day  Rating of Pain at worst: 8/10  Rating of Pain Currently: 4/10  Worsened by: mornings, reaching for things, putting weight on it, turning neck the right causes stiffness, sleeping on his side, sneezing  Better with: leaning up against a wall, putting pressure on the blade,   Treatments tried: ice, heat, Tylenol and home exercises  Associated symptoms: swelling, tingling and locking or catching  Orthopedic history: YES - Date: shoulder injury in  2004  Relevant surgical history: NO  Social history: : College student at [employer]    Past Medical History:   Diagnosis Date     Esophageal reflux      Gallstone pancreatitis 2/28/2019     Hypertension      Migraine headache      Obese      Social History     Socioeconomic History     Marital status: Single     Spouse name: Not on file     Number of children: Not on file     Years of education: Not on file     Highest education level: Not on file   Occupational History     Employer: STUDENT     Comment: [employer]   Social Needs     Financial resource strain: Not on file     Food insecurity     Worry: Not on file     Inability: Not on file     Transportation needs     Medical: Not on file     Non-medical: Not on file   Tobacco Use     Smoking status: Never Smoker     Smokeless tobacco: Never Used   Substance and Sexual Activity     Alcohol use: Yes     Comment: A beer or two a couple of times a month     Drug use: No     Sexual activity: Not Currently     Partners: Female     Birth control/protection: Condom, I.U.D.   Lifestyle     Physical activity     Days per week: Not on file     Minutes per session: Not on file     Stress: Not on file   Relationships     Social connections     Talks on phone: Not on file     Gets together: Not on file     Attends Advent service: Not on file     Active member of club or organization: Not on file     Attends meetings of clubs or organizations: Not on file     Relationship status: Not on file     Intimate partner violence     Fear of current or ex partner: Not on file     Emotionally abused: Not on file     Physically abused: Not on file     Forced sexual activity: Not on file   Other Topics Concern     Parent/sibling w/ CABG, MI or angioplasty before 65F 55M? No   Social History Narrative     Not on file         Patient's past medical, surgical, social, and family histories were reviewed today and no changes are noted.    REVIEW OF SYSTEMS:  10 point ROS  is negative other than symptoms noted above in HPI, Past Medical History or as stated below  Constitutional: NEGATIVE for fever, chills, change in weight  Skin: NEGATIVE for worrisome rashes, moles or lesions  GI/: NEGATIVE for bowel or bladder changes  Neuro: NEGATIVE for weakness, dizziness or paresthesias    OBJECTIVE:  /80   Ht 1.829 m (6')   BMI 49.10 kg/m     General: healthy, alert and in no distress  HEENT: no scleral icterus or conjunctival erythema  Skin: no suspicious lesions or rash. No jaundice.  CV: regular rhythm by palpation  Resp: normal respiratory effort without conversational dyspnea   Psych: normal mood and affect  Gait: normal steady gait with appropriate coordination and balance  Neuro: normal light touch sensory exam of the bilateral upper extremities.    MSK:  LEFT SHOULDER  Inspection:    no swelling, bruising, discoloration, or obvious deformity or asymmetry  Palpation:    Tender about the upper trapezius region and rhomboids. Remainder of bony and tendinous landmarks are nontender.  Active Range of Motion:     Abduction normal0, FF normal0, ER normal0, IR normal.      Scapular dyskinesis mild  Strength:    Scapular plane abduction grossly intact,  ER grossly intact, IR grossly intact, biceps grossly intact, triceps grossly intact  Special Tests:    Positive: none    Negative: Neer's, Bustos', supraspinatus (empty can), crossed arm adduction, [place]'s and Speed's    CERVICAL SPINE  Inspection:    normal cervical lordosis present, rounded shoulders, forward head posture  Palpation:    Tender about the levator scapula (left), upper trapezius (left), lower trapezius (left) and rhomboids (left). Otherwise remainder of the landmarks and nontender.  Range of Motion:     Flexion within normal limits    Extension within normal limits    Right side bend within normal limits    Left side bend within normal limits    Right rotation within normal limits    Left rotation within normal  limits  Strength:    Full strength throughout all neck muscles  Special Tests:    Positive: None    Negative: Spurling's (bilateral), Myles's (bilateral)    Independent visualization of the below image:  No results found for this or any previous visit (from the past 24 hour(s)).          Albert Yeo MD [facility]      Again, thank you for allowing me to participate in the care of your patient.        Sincerely,        Albert Yeo, MD

## 2020-07-20 NOTE — PROGRESS NOTES
ASSESSMENT & PLAN  Patient Instructions     1. Muscle strain of left scapular region, initial encounter      -Patient has left shoulder pain due to strain of the periscapular muscles  -Patient will start formal physical therapy and home exercise program  -Patient may participate in activities as tolerated  -Patient may take Tylenol or Advil as needed for pain  -Patient will follow-up if pain does not improve  -Call direct clinic number [[phone]] at any time with questions or concerns.    Albert Yeo MD [facility] and Sports Medicine  [facility]          -----    SUBJECTIVE  Keo Pinto is a/an 35 year old Right handed male who is seen in consultation at the request of  Tian Perez M.D. for evaluation of left shoulder pain. The patient is seen by themselves.    Onset: 14 years(s) ago. Thinks pain started as a work injury had a pallet fall on his shoulder. Has been treating it with home exercises and usually helps, but takes longer and longer for the pain to go away. Has been bugging him for the last 2 weeks, thinks tweaked while lifting something  Location of Pain: left posterior shoulder blade pain, that goes up into his neck. Mornings are worse and then gets less painful thru out the day  Rating of Pain at worst: 8/10  Rating of Pain Currently: 4/10  Worsened by: mornings, reaching for things, putting weight on it, turning neck the right causes stiffness, sleeping on his side, sneezing  Better with: leaning up against a wall, putting pressure on the blade,   Treatments tried: ice, heat, Tylenol and home exercises  Associated symptoms: swelling, tingling and locking or catching  Orthopedic history: YES - Date: shoulder injury in 2004  Relevant surgical history: NO  Social history: : College student at [employer]    Past Medical History:   Diagnosis Date     Esophageal reflux      Gallstone pancreatitis 2/28/2019     Hypertension      Migraine headache       Obese      Social History     Socioeconomic History     Marital status: Single     Spouse name: Not on file     Number of children: Not on file     Years of education: Not on file     Highest education level: Not on file   Occupational History     Employer: STUDENT     Comment: [employer]   Social Needs     Financial resource strain: Not on file     Food insecurity     Worry: Not on file     Inability: Not on file     Transportation needs     Medical: Not on file     Non-medical: Not on file   Tobacco Use     Smoking status: Never Smoker     Smokeless tobacco: Never Used   Substance and Sexual Activity     Alcohol use: Yes     Comment: A beer or two a couple of times a month     Drug use: No     Sexual activity: Not Currently     Partners: Female     Birth control/protection: Condom, I.U.D.   Lifestyle     Physical activity     Days per week: Not on file     Minutes per session: Not on file     Stress: Not on file   Relationships     Social connections     Talks on phone: Not on file     Gets together: Not on file     Attends Baptism service: Not on file     Active member of club or organization: Not on file     Attends meetings of clubs or organizations: Not on file     Relationship status: Not on file     Intimate partner violence     Fear of current or ex partner: Not on file     Emotionally abused: Not on file     Physically abused: Not on file     Forced sexual activity: Not on file   Other Topics Concern     Parent/sibling w/ CABG, MI or angioplasty before 65F 55M? No   Social History Narrative     Not on file         Patient's past medical, surgical, social, and family histories were reviewed today and no changes are noted.    REVIEW OF SYSTEMS:  10 point ROS is negative other than symptoms noted above in HPI, Past Medical History or as stated below  Constitutional: NEGATIVE for fever, chills, change in weight  Skin: NEGATIVE for worrisome rashes, moles or lesions  GI/: NEGATIVE for bowel or bladder  changes  Neuro: NEGATIVE for weakness, dizziness or paresthesias    OBJECTIVE:  /80   Ht 1.829 m (6')   BMI 49.10 kg/m     General: healthy, alert and in no distress  HEENT: no scleral icterus or conjunctival erythema  Skin: no suspicious lesions or rash. No jaundice.  CV: regular rhythm by palpation  Resp: normal respiratory effort without conversational dyspnea   Psych: normal mood and affect  Gait: normal steady gait with appropriate coordination and balance  Neuro: normal light touch sensory exam of the bilateral upper extremities.    MSK:  LEFT SHOULDER  Inspection:    no swelling, bruising, discoloration, or obvious deformity or asymmetry  Palpation:    Tender about the upper trapezius region and rhomboids. Remainder of bony and tendinous landmarks are nontender.  Active Range of Motion:     Abduction normal0, FF normal0, ER normal0, IR normal.      Scapular dyskinesis mild  Strength:    Scapular plane abduction grossly intact,  ER grossly intact, IR grossly intact, biceps grossly intact, triceps grossly intact  Special Tests:    Positive: none    Negative: Neer's, Bustos', supraspinatus (empty can), crossed arm adduction, [place]'s and Speed's    CERVICAL SPINE  Inspection:    normal cervical lordosis present, rounded shoulders, forward head posture  Palpation:    Tender about the levator scapula (left), upper trapezius (left), lower trapezius (left) and rhomboids (left). Otherwise remainder of the landmarks and nontender.  Range of Motion:     Flexion within normal limits    Extension within normal limits    Right side bend within normal limits    Left side bend within normal limits    Right rotation within normal limits    Left rotation within normal limits  Strength:    Full strength throughout all neck muscles  Special Tests:    Positive: None    Negative: Spurling's (bilateral), Myles's (bilateral)    Independent visualization of the below image:  No results found for this or any previous  visit (from the past 24 hour(s)).          Albert Yeo MD [facility]

## 2020-07-29 ENCOUNTER — TELEPHONE (OUTPATIENT)
Dept: PEDIATRICS | Facility: CLINIC | Age: 35
End: 2020-07-29

## 2020-07-29 ENCOUNTER — OFFICE VISIT (OUTPATIENT)
Dept: URGENT CARE | Facility: URGENT CARE | Age: 35
End: 2020-07-29
Payer: COMMERCIAL

## 2020-07-29 VITALS
BODY MASS INDEX: 54.93 KG/M2 | HEART RATE: 100 BPM | TEMPERATURE: 98.2 F | OXYGEN SATURATION: 95 % | SYSTOLIC BLOOD PRESSURE: 156 MMHG | WEIGHT: 315 LBS | DIASTOLIC BLOOD PRESSURE: 112 MMHG

## 2020-07-29 DIAGNOSIS — I10 UNCONTROLLED HYPERTENSION: Primary | ICD-10-CM

## 2020-07-29 PROCEDURE — 99214 OFFICE O/P EST MOD 30 MIN: CPT | Performed by: FAMILY MEDICINE

## 2020-07-29 RX ORDER — LISINOPRIL 20 MG/1
20 TABLET ORAL DAILY
Qty: 30 TABLET | Refills: 0 | Status: SHIPPED | OUTPATIENT
Start: 2020-07-29 | End: 2020-08-31

## 2020-07-29 NOTE — TELEPHONE ENCOUNTER
Spoke with the Pt.     He reports new onset much higher than baseline BP.   The Pt reports normally he runs in the 110's/70's at home (with his at home monitor)  He tends to run in the 130's/80's in the clinic.     Over the last 2 days he has had a drastic rise in his BP.   This morning his BP was 165/103 and this afternoon it was 170/114.    He took his BP while on the phone and it was 154/11.     He is feeling facial flushing and feeling very hot. He also feels like his heart is beating much harder than normal, he can feel it pounding in his chest. Denies CP or respiratory symptoms.     He has been taking his BP med's as advised. Has not missed any doses.     Advised UC evaluation today. He agrees with plan.    Heaven Edwards RN   [facility] -- Triage Nurse

## 2020-07-29 NOTE — TELEPHONE ENCOUNTER
Symptoms  Describe your symptoms: /114  Any pain: No  How long have you been having symptoms: 1  days  Have you been seen for this:  Yes, on BP medication.   Appointment offered?: No  Triage offered?: Yes: connected  Home remedies tried: n/a  Requested Pharmacy: n/a  Okay to leave a detailed message? No at Cell number on file:    Telephone Information:   Mobile [phone]       Eri Boucher on 7/29/2020 at 4:40 PM

## 2020-07-29 NOTE — PROGRESS NOTES
SUBJECTIVE:   Keo Pinto is a 35 year old male presenting with a chief complaint of elevated BP.    Woke up this morning and felt unwell.  Has a blood pressure machine and check and had elevated 's.  Takes both metoprolol and lisinopril together in the morning, generally has been good with medication adherence.  Patient states that BP was well controlled when he used to swim daily, hoping to get into a better regimen.    Has sleep change, trying to move from night owl to more conventional sleep.  Was doing better with sleep, not eating too much salt.  Has been working on left shoulder pain, seen ortho about this and going to physical therapy.  Given flexeril but has not picked up yet as hesitant about taking this, worried that may interact with current medications.    Patient has notice that over the past 6-8 months that BP has trended to be more elevated, will check his BP couple of times a week.    Has underlying anxiety but feels that has this under better control, will do breathing exercises and verbalized that has not felt he needed to be taking ativan.  Commented that when seen in ER, given ativan and this made his BP better, not keen on reaching for this medication every time    Past Medical History:   Diagnosis Date     Esophageal reflux      Gallstone pancreatitis 2/28/2019     Hypertension      Migraine headache      Obese      Current Outpatient Medications   Medication Sig Dispense Refill     lisinopril (ZESTRIL) 10 MG tablet Take 1 tablet (10 mg) by mouth daily 90 tablet 3     LORazepam (ATIVAN) 1 MG tablet Take 1 tablet (1 mg) by mouth 3 times daily as needed for anxiety 10 tablet 0     metoprolol succinate ER (TOPROL-XL) 200 MG 24 hr tablet TAKE 1 TABLET(200 MG) BY MOUTH DAILY 90 tablet 3     cyclobenzaprine (FLEXERIL) 10 MG tablet Take 1 tablet (10 mg) by mouth nightly as needed for muscle spasms (Patient not taking: Reported on 7/29/2020) 30 tablet 0     Lidocaine-Menthol 4-4 % PTCH  Externally apply topically daily as needed       pantoprazole (PROTONIX) 40 MG EC tablet Take 1 tablet (40 mg) by mouth daily 30 tablet 0     Social History     Tobacco Use     Smoking status: Never Smoker     Smokeless tobacco: Never Used   Substance Use Topics     Alcohol use: Yes     Comment: A beer or two a couple of times a month       ROS:  Review of systems negative except as stated above.    OBJECTIVE:  BP (!) 165/110   Pulse 126   Temp 98.2  F (36.8  C)   Wt (!) 183.7 kg (405 lb)   SpO2 95%   BMI 54.93 kg/m    GENERAL APPEARANCE: healthy, alert and no distress  PSYCH: mentation appears normal and affect normal/bright    ASSESSMENT/PLAN:  (I10) Uncontrolled hypertension  (primary encounter diagnosis)  Plan: lisinopril (ZESTRIL) 20 MG tablet            Had long discussion in regards to HTN management with patient, has underlying medical co-morbidities that makes it challenging but patient has been able to work on them and has improved in some areas.  Reviewed that Urgent Care does not have the means to given medication that would lower BP urgently - this really is best done in ER setting and patient verbalized understanding.  Discussed that anxiety, sleep disturbance, pain will all affect this.  Encourage to find routine with regular swimming exercise to help with weight loss and overall improve cardiovascular health.  Discussed that only take ativan if the anxiety is the source that is driving the BP, should not be the other way.  As patient reports that BP overall has trended to be higher over the past many months, will increase dosing - RX lisinopirl 20 mg daily.  Okay to take extra 10 mg lisinopril this evening.    Recommend to follow up with primary provider in 1-2 weeks in regards to HTN management.    Billy Mendez MD, MD  July 29, 2020 7:20 PM

## 2020-08-29 ENCOUNTER — NURSE TRIAGE (OUTPATIENT)
Dept: NURSING | Facility: CLINIC | Age: 35
End: 2020-08-29

## 2020-08-29 DIAGNOSIS — I10 UNCONTROLLED HYPERTENSION: ICD-10-CM

## 2020-08-29 NOTE — TELEPHONE ENCOUNTER
Lisinopril changed to 20 mg on 7-29-20, and his BP has been averaging 130s/80s since taking the new dose.  He does not have a refill, and has not made the follow up appointment for refills.    He does still have 10mg tablets of Lisinopril left from his prior prescription, and said he can take two of those until his next refill.    Warm transferred to schedulers to make a virtual follow up appointment with Dr. Perez early next week.    Mare Montiel RN  [facility]            Additional Information    Caller has medication question only, adult not sick, and triager answers question    Protocols used: MEDICATION QUESTION CALL-A-

## 2020-08-31 RX ORDER — LISINOPRIL 20 MG/1
TABLET ORAL
Qty: 30 TABLET | Refills: 0 | Status: SHIPPED | OUTPATIENT
Start: 2020-08-31 | End: 2020-09-01 | Stop reason: DRUGHIGH

## 2020-08-31 NOTE — TELEPHONE ENCOUNTER
Routing refill request to provider for review/approval because:  Elevated BP    Heaven Edwards RN   [facility] -- Triage Nurse

## 2020-09-01 ENCOUNTER — VIRTUAL VISIT (OUTPATIENT)
Dept: PEDIATRICS | Facility: CLINIC | Age: 35
End: 2020-09-01
Payer: COMMERCIAL

## 2020-09-01 VITALS — DIASTOLIC BLOOD PRESSURE: 96 MMHG | SYSTOLIC BLOOD PRESSURE: 155 MMHG | HEART RATE: 80 BPM

## 2020-09-01 DIAGNOSIS — I10 ESSENTIAL HYPERTENSION: Primary | ICD-10-CM

## 2020-09-01 PROCEDURE — 99213 OFFICE O/P EST LOW 20 MIN: CPT | Mod: 95 | Performed by: INTERNAL MEDICINE

## 2020-09-01 RX ORDER — LISINOPRIL 40 MG/1
40 TABLET ORAL DAILY
Qty: 90 TABLET | Refills: 1 | Status: SHIPPED | OUTPATIENT
Start: 2020-09-01 | End: 2021-03-18

## 2020-09-01 NOTE — PATIENT INSTRUCTIONS
Increase lisinopril to 40 mg once per day    Please send blood pressure updates over the next few weeks    If your BP is not 110-130's systolic most of the time, we can discuss adding a 3rd medication

## 2020-09-01 NOTE — PROGRESS NOTES
"Keo Pinto is a 35 year old male who is being evaluated via a billable video visit.      The patient has been notified of following:     \"This video visit will be conducted via a call between you and your physician/provider. We have found that certain health care needs can be provided without the need for an in-person physical exam.  This service lets us provide the care you need with a video conversation.  If a prescription is necessary we can send it directly to your pharmacy.  If lab work is needed we can place an order for that and you can then stop by our lab to have the test done at a later time.    Video visits are billed at different rates depending on your insurance coverage.  Please reach out to your insurance provider with any questions.    If during the course of the call the physician/provider feels a video visit is not appropriate, you will not be charged for this service.\"    Patient has given verbal consent for Video visit? Yes  How would you like to obtain your AVS? MyChart  Will anyone else be joining your video visit? No    Subjective     Keo Pinto is a 35 year old male who presents today via video visit for the following health issues:    HPI    Followup of hypertension    Taking Medication as prescribed: yes    Side Effects:  None    Medication Helping Symptoms:  yes     Had a significant increase in BP readings.  Evaluated in UC.  Had lisinopril dose increased to 20 mg per day.    BP improved within a few days.    Has a home BP cuff.  Checking 2-3 times per day, typical readings are 140-170/85-95    Current meds:  Lisinopril 20 mg daily  Metoprolol 200 mg daily    HR has been 60-80 BPM    Has a nutrition appointment scheduled later this month.  Wt Readings from Last 4 Encounters:   07/29/20 (!) 183.7 kg (405 lb)   11/14/19 (!) 164.2 kg (362 lb)   03/21/19 (!) 164.2 kg (362 lb)   03/08/19 (!) 164.4 kg (362 lb 8 oz)       Video Start Time: 11:28 AM          Objective       Vitals:  No " vitals were obtained today due to virtual visit.    Physical Exam     GEN: No distress  SKIN: No visible rashes  E: No eye drainage or icterus  ENT: No nasal discharge noted  NECK: Supple  LUNGS: No active cough, wheezing.   PSYCH: Normal affect. Well groomed.   NEURO: No focal deficits appreciated             ICD-10-CM    1. Essential hypertension  I10 lisinopril (ZESTRIL) 40 MG tablet     BP is still not fully controlled.    Increase lisinopril to 40 mg once per day    Please send blood pressure updates over the next few weeks    If your BP is not 110-130's systolic most of the time, we can discuss adding a 3rd medication    Video-Visit Details    Type of service:  Video Visit    Video End Time:11:41 AM    Originating Location (pt. Location): Home    Distant Location (provider location):  [facility]3]     Platform used for Video Visit: [employer]

## 2020-09-14 ENCOUNTER — OFFICE VISIT (OUTPATIENT)
Dept: PEDIATRICS | Facility: CLINIC | Age: 35
End: 2020-09-14
Payer: COMMERCIAL

## 2020-09-14 ENCOUNTER — HOSPITAL ENCOUNTER (OUTPATIENT)
Dept: NUTRITION | Facility: CLINIC | Age: 35
Discharge: HOME | End: 2020-09-14
Attending: INTERNAL MEDICINE | Admitting: INTERNAL MEDICINE
Payer: COMMERCIAL

## 2020-09-14 VITALS
OXYGEN SATURATION: 97 % | WEIGHT: 315 LBS | HEIGHT: 72 IN | TEMPERATURE: 98.6 F | HEART RATE: 91 BPM | SYSTOLIC BLOOD PRESSURE: 136 MMHG | DIASTOLIC BLOOD PRESSURE: 84 MMHG | RESPIRATION RATE: 16 BRPM | BODY MASS INDEX: 42.66 KG/M2

## 2020-09-14 DIAGNOSIS — I10 ESSENTIAL HYPERTENSION: ICD-10-CM

## 2020-09-14 DIAGNOSIS — F41.1 ANXIETY STATE: ICD-10-CM

## 2020-09-14 DIAGNOSIS — Z00.00 ROUTINE GENERAL MEDICAL EXAMINATION AT A HEALTH CARE FACILITY: Primary | ICD-10-CM

## 2020-09-14 DIAGNOSIS — Z23 NEED FOR VACCINATION: ICD-10-CM

## 2020-09-14 DIAGNOSIS — E66.01 MORBID OBESITY (H): ICD-10-CM

## 2020-09-14 PROCEDURE — 36415 COLL VENOUS BLD VENIPUNCTURE: CPT | Performed by: INTERNAL MEDICINE

## 2020-09-14 PROCEDURE — 80061 LIPID PANEL: CPT | Performed by: INTERNAL MEDICINE

## 2020-09-14 PROCEDURE — 90686 IIV4 VACC NO PRSV 0.5 ML IM: CPT | Performed by: INTERNAL MEDICINE

## 2020-09-14 PROCEDURE — 97802 MEDICAL NUTRITION INDIV IN: CPT | Mod: GT | Performed by: DIETITIAN, REGISTERED

## 2020-09-14 PROCEDURE — 96127 BRIEF EMOTIONAL/BEHAV ASSMT: CPT | Performed by: INTERNAL MEDICINE

## 2020-09-14 PROCEDURE — 87389 HIV-1 AG W/HIV-1&-2 AB AG IA: CPT | Performed by: INTERNAL MEDICINE

## 2020-09-14 PROCEDURE — 99395 PREV VISIT EST AGE 18-39: CPT | Mod: 25 | Performed by: INTERNAL MEDICINE

## 2020-09-14 PROCEDURE — 80048 BASIC METABOLIC PNL TOTAL CA: CPT | Performed by: INTERNAL MEDICINE

## 2020-09-14 PROCEDURE — 90471 IMMUNIZATION ADMIN: CPT | Performed by: INTERNAL MEDICINE

## 2020-09-14 RX ORDER — ALPRAZOLAM 0.5 MG
0.5 TABLET ORAL 3 TIMES DAILY PRN
Qty: 30 TABLET | Refills: 0 | Status: SHIPPED | OUTPATIENT
Start: 2020-09-14 | End: 2022-02-10

## 2020-09-14 ASSESSMENT — ENCOUNTER SYMPTOMS
HEADACHES: 0
NAUSEA: 0
ARTHRALGIAS: 0
PALPITATIONS: 0
FREQUENCY: 0
SHORTNESS OF BREATH: 0
CHILLS: 0
FEVER: 0
HEMATURIA: 0
ABDOMINAL PAIN: 0
PARESTHESIAS: 0
HEARTBURN: 0
COUGH: 0
DYSURIA: 0
HEMATOCHEZIA: 0
SORE THROAT: 0
WEAKNESS: 0
MYALGIAS: 0
DIARRHEA: 0
DIZZINESS: 0
CONSTIPATION: 0
NERVOUS/ANXIOUS: 0
JOINT SWELLING: 0
EYE PAIN: 0

## 2020-09-14 ASSESSMENT — MIFFLIN-ST. JEOR: SCORE: 2837.11

## 2020-09-14 NOTE — PROGRESS NOTES
"Keo Pinto is a 35 year old male who is being evaluated via a billable video visit.      The patient has been notified of following:     \"This video visit will be conducted via a call between you and your physician/provider. We have found that certain health care needs can be provided without the need for an in-person physical exam.  This service lets us provide the care you need with a video conversation.  If a prescription is necessary we can send it directly to your pharmacy.  If lab work is needed we can place an order for that and you can then stop by our lab to have the test done at a later time.    Video visits are billed at different rates depending on your insurance coverage.  Please reach out to your insurance provider with any questions.    If during the course of the call the physician/provider feels a video visit is not appropriate, you will not be charged for this service.\"    Patient has given verbal consent for Video visit? Yes    Video-Visit Details    Type of service:  Video Visit    Video Start Time: 5:25  Video End Time: 6:29    Originating Location (pt. Location): Home    Distant Location (provider location):  [facility]     Platform used for Video Visit: JumpCam      OUTPATIENT NUTRITION ASSESSMENT (VIDEO VISIT DUE TO COVID-19)  REASON FOR ASSESSMENT  Keo Pinto referred by Dr. Perez  for MNT related to Morbid obesity (H) [E66.01]  - Primary   Patient accompanied by self    ASSESSMENT   Nutrition History:  - Information obtained from patient  - Patient is on a low salt diet at home.  Patient very concerned about his elevated blood pressure.  Patient bought DASH diet and [facility] diet books to help manage weight and blood pressure.  Patient tries to keep sodium ingestion to <1749-3054 mg per day.  Patient states biggest struggle with food is consistency and too large of portions prior to gallstone surgery.  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  Patient has lost weight in the past when he followed the Eat to Live diet when he followed a vegetarian diet.      Diet Recall:  Breakfast: oatmeal or egg and toast + oranges/apple/banana/watermelon   Lunch: skips   Dinner: mustard/currie turkey , lettuce, tomato, onion sandwich or chicken + vegetable (broccoli/cauliflower/asparagus) chicken thighs and potato with haja mustard sauce or fish (5:00-7:30)  Snack: 1/2 cup nuts (peanuts, cashews, walnuts) (past 2-3 months); fruit (apple or orange 1-2 or 4 tangerines)  Beverages: water (no soda or caffeine); sparkling water  Dining out: Paida-chicken wrap; chicken noodle (lo mein); burger at [employer]     Exercise: Patient walking 1/2 mile-1 mile per week.     NUTRITION FOCUSED PHYSICAL ASSESSMENT (NFPA) FOR DIAGNOSING MALNUTRITION  Yes         Observed:   No nutrition-related physical findings observed    Obtained from Chart/Interdisciplinary Team:  None noted    LABS  Labs reviewed    MEDICATIONS  Medications reviewed    ANTHROPOMETRICS   Height: 5'11.73  Weight: 413 lbs   BMI (kg/m2): 56.4 kg/m2  Weight Status:  Obesity Grade III BMI >40  %IBW: 240%  Weight History:   Wt Readings from Last 10 Encounters:   09/14/20 (!) 187.3 kg (413 lb)   07/29/20 (!) 183.7 kg (405 lb)   11/14/19 (!) 164.2 kg (362 lb)   03/21/19 (!) 164.2 kg (362 lb)   03/08/19 (!) 164.4 kg (362 lb 8 oz)   03/03/19 (!) 172.4 kg (380 lb)   02/28/19 (!) 171.5 kg (378 lb)   01/28/19 (!) 176 kg (388 lb)   01/22/19 (!) 179.2 kg (395 lb)   01/11/19 (!) 180 kg (396 lb 12.8 oz)     ASSESSED NUTRITION NEEDS  Estimated Energy Needs: 9668-6436 kcals/day (11-14 Kcal/Kg)  Justification:  (obese)  Estimated Protein Needs: 107-129 grams protein/day (1-1.2 g pro/Kg)  Justification:  (preservation of lean body mass)  Estimated Fluid Needs: per MD     ASSESSED MALNUTRITION STATUS  % Weight Loss:  None noted  % Intake:  No decreased intake noted  Subcutaneous Fat Loss:  None observed  Loss of  Muscle Mass:  None observed  Fluid Retention:  None noted    Malnutrition Diagnosis:  Patient does not meet two of the above criteria necessary for diagnosing malnutrition    DIAGNOSIS   Nutrition Diagnosis: Obese, class III related to inconsistent eating pattern as evidenced by BMI of 56.4 kg/m2       INTERVENTIONS   Nutrition Prescription  Recommend 3 meals per day eaten 4-5 hours apart; modified calorie intake to 2000 calories per day for weight loss     IMPLEMENTATION   Assessed learning needs and learning preference  Teaching Method(s) used: Explanation  Diet Education:  Provided education on weight loss, low sodium diet   Nutrition Education (Content):   a)  Discussed label reading, carbohydrate counting, low sodium diet and behavior modification.  Discussed DASH diet (Dietary Approach to Stop Hypertension).  Encouraged patient to begin eating 3 meals per day with meals 4-5 hours apart. Supported patient with challenge of making lifestyle changes for long term weight loss success.   b)  Provided Lowering Your Blood Pressure with DASH handout   Nutrition Education (Application):   a)  Discussed current eating plans and recommended increase in fruit and vegetable consumption for DASH diet plan.     b)  Patient verbalizes understanding of diet by stating will eat more consistently.    Anticipate fair-good compliance     GOALS  Eat within 1 hour of waking  Eat 3 meals per day 4-5 hours apart    FOLLOW UP/MONITORING   Progress towards goals will be monitored and evaluated per protocol and Practice Guidelines  Patient to follow up in 3 weeks  RD name and number provided     Time Spent with Patient  64 minutes    Gavi Salas, RD, LD  [facility] Outpatient Dietitian  [phone] (office phone)

## 2020-09-14 NOTE — PROGRESS NOTES
SUBJECTIVE:   CC: Keo Pinto is an 35 year old male who presents for preventative health visit.     Healthy Habits:     Getting at least 3 servings of Calcium per day:  Yes    Bi-annual eye exam:  Yes    Dental care twice a year:  NO    Sleep apnea or symptoms of sleep apnea:  None    Diet:  Low salt    Frequency of exercise:  1 day/week    Duration of exercise:  15-30 minutes    Taking medications regularly:  Yes    Medication side effects:  None    PHQ-2 Total Score: 0    Additional concerns today:  No    HTN. Working on BP med adjustment.  Currently taking lisinopril at 30 mg per day.  Getting readings of 120-130's / upper 80's.    Anxiety. Occasional use of alprazolam.  Seeing a counselor, has been for the past year.  Plans to establish w/ a psychiatrist.    Obesity. Weight is increasing.  Wt Readings from Last 4 Encounters:   09/14/20 (!) 187.3 kg (413 lb)   07/29/20 (!) 183.7 kg (405 lb)   11/14/19 (!) 164.2 kg (362 lb)   03/21/19 (!) 164.2 kg (362 lb)     Has a visit w/ a dietician later today. Discussed methods to help with weight control.        Today's PHQ-2 Score:   PHQ-2 ( 1999 Pfizer) 9/14/2020   Q1: Little interest or pleasure in doing things 0   Q2: Feeling down, depressed or hopeless 0   PHQ-2 Score 0   Q1: Little interest or pleasure in doing things Not at all   Q2: Feeling down, depressed or hopeless Not at all   PHQ-2 Score 0       Abuse: Current or Past(Physical, Sexual or Emotional)- No  Do you feel safe in your environment? Yes      Social History     Tobacco Use     Smoking status: Never Smoker     Smokeless tobacco: Never Used   Substance Use Topics     Alcohol use: Yes     Comment: A beer or two a couple of times a month     If you drink alcohol do you typically have >3 drinks per day or >7 drinks per week? No    Alcohol Use 9/14/2020   Prescreen: >3 drinks/day or >7 drinks/week? No   Prescreen: >3 drinks/day or >7 drinks/week? -       Reviewed orders with patient. Reviewed health  "maintenance and updated orders accordingly - Yes      Reviewed and updated as needed this visit by Provider  Tobacco  Allergies  Meds  Problems  Med Hx  Surg Hx  Fam Hx          Review of Systems   Constitutional: Negative for chills and fever.   HENT: Negative for congestion, ear pain, hearing loss and sore throat.    Eyes: Negative for pain and visual disturbance.   Respiratory: Negative for cough and shortness of breath.    Cardiovascular: Negative for chest pain, palpitations and peripheral edema.   Gastrointestinal: Negative for abdominal pain, constipation, diarrhea, heartburn, hematochezia and nausea.   Genitourinary: Negative for discharge, dysuria, frequency, genital sores, hematuria, impotence and urgency.   Musculoskeletal: Negative for arthralgias, joint swelling and myalgias.   Skin: Negative for rash.   Neurological: Negative for dizziness, weakness, headaches and paresthesias.   Psychiatric/Behavioral: Negative for mood changes. The patient is not nervous/anxious.        OBJECTIVE:   /84   Pulse 91   Temp 98.6  F (37  C) (Tympanic)   Resp 16   Ht 1.822 m (5' 11.73\")   Wt (!) 187.3 kg (413 lb)   SpO2 97%   BMI 56.43 kg/m      Physical Exam  GENERAL: healthy, alert and no distress  EYES: Eyes grossly normal to inspection, PERRL and conjunctivae and sclerae normal  HENT: ear canals and TM's normal, nose and mouth without ulcers or lesions  NECK: no adenopathy, no asymmetry, masses, or scars and thyroid normal to palpation  RESP: lungs clear to auscultation - no rales, rhonchi or wheezes  CV: regular rate and rhythm, normal S1 S2, no murmur. Trace pretibial edema  ABDOMEN: soft, nontender, bowel sounds normal  MS: no gross musculoskeletal defects noted, no edema  SKIN: no suspicious lesions or rashes  NEURO: Normal strength and tone, mentation intact and speech normal  PSYCH: mentation appears normal, affect normal/bright      ASSESSMENT/PLAN:       ICD-10-CM    1. Routine general " "medical examination at a health care facility  Z00.00 Lipid panel reflex to direct LDL Fasting     Basic metabolic panel     HIV Antigen Antibody Combo   2. Anxiety state  F41.1 ALPRAZolam (XANAX) 0.5 MG tablet   3. Essential hypertension  I10    4. Morbid obesity (H)  E66.01    5. Need for vaccination  Z23 INFLUENZA VACCINE IM > 6 MONTHS VALENT IIV4 [78390]     Anxiety - continue w/ counseling and keep upcoming psychiatry visit  HTN - BP is better controlled. Encouraged increasing lisinopril to 40 mg daily.     COUNSELING:   Reviewed preventive health counseling, as reflected in patient instructions    Estimated body mass index is 56.43 kg/m  as calculated from the following:    Height as of this encounter: 1.822 m (5' 11.73\").    Weight as of this encounter: 187.3 kg (413 lb).     Weight management plan: Discussed healthy diet and exercise guidelines    He reports that he has never smoked. He has never used smokeless tobacco.      Tian Perez MD  [facility]  "

## 2020-09-15 LAB
ANION GAP SERPL CALCULATED.3IONS-SCNC: 8 MMOL/L (ref 3–14)
BUN SERPL-MCNC: 10 MG/DL (ref 7–30)
CALCIUM SERPL-MCNC: 9.4 MG/DL (ref 8.5–10.1)
CHLORIDE SERPL-SCNC: 104 MMOL/L (ref 94–109)
CHOLEST SERPL-MCNC: 136 MG/DL
CO2 SERPL-SCNC: 24 MMOL/L (ref 20–32)
CREAT SERPL-MCNC: 0.85 MG/DL (ref 0.66–1.25)
GFR SERPL CREATININE-BSD FRML MDRD: >90 ML/MIN/{1.73_M2}
GLUCOSE SERPL-MCNC: 82 MG/DL (ref 70–99)
HDLC SERPL-MCNC: 49 MG/DL
HIV 1+2 AB+HIV1 P24 AG SERPL QL IA: NONREACTIVE
LDLC SERPL CALC-MCNC: 50 MG/DL
NONHDLC SERPL-MCNC: 87 MG/DL
POTASSIUM SERPL-SCNC: 3.9 MMOL/L (ref 3.4–5.3)
SODIUM SERPL-SCNC: 136 MMOL/L (ref 133–144)
TRIGL SERPL-MCNC: 185 MG/DL

## 2020-10-13 ENCOUNTER — VIRTUAL VISIT (OUTPATIENT)
Dept: PEDIATRICS | Facility: CLINIC | Age: 35
End: 2020-10-13
Payer: COMMERCIAL

## 2020-10-13 DIAGNOSIS — B35.4 TINEA CORPORIS: Primary | ICD-10-CM

## 2020-10-13 PROCEDURE — 99213 OFFICE O/P EST LOW 20 MIN: CPT | Mod: 95 | Performed by: INTERNAL MEDICINE

## 2020-10-13 RX ORDER — CLOTRIMAZOLE 1 %
CREAM (GRAM) TOPICAL 2 TIMES DAILY
Qty: 60 G | Refills: 2 | Status: SHIPPED | OUTPATIENT
Start: 2020-10-13 | End: 2024-04-03

## 2020-10-13 NOTE — PATIENT INSTRUCTIONS
Clotrimazole 1% cream   Apply to the affected areas twice per day    Use for 5 days after the skin appears normal    Contact me if the areas do not clear up within the next 2 weeks

## 2020-10-13 NOTE — PROGRESS NOTES
"Keo Pinto is a 35 year old male who is being evaluated via a billable video visit.      The patient has been notified of following:     \"This video visit will be conducted via a call between you and your physician/provider. We have found that certain health care needs can be provided without the need for an in-person physical exam.  This service lets us provide the care you need with a video conversation.  If a prescription is necessary we can send it directly to your pharmacy.  If lab work is needed we can place an order for that and you can then stop by our lab to have the test done at a later time.    Video visits are billed at different rates depending on your insurance coverage.  Please reach out to your insurance provider with any questions.    If during the course of the call the physician/provider feels a video visit is not appropriate, you will not be charged for this service.\"    Patient has given verbal consent for Video visit? Yes  How would you like to obtain your AVS? MyChart  If you are dropped from the video visit, the video invite should be resent to: Text to cell phone: [phone]  Will anyone else be joining your video visit? No    Subjective     Keo Pinto is a 35 year old male who presents today via video visit for the following health issues:    History of Present Illness       He eats 0-1 servings of fruits and vegetables daily.He consumes 0 sweetened beverage(s) daily.He exercises with enough effort to increase his heart rate 10 to 19 minutes per day.  He exercises with enough effort to increase his heart rate 3 or less days per week.   He is taking medications regularly.       Rash  Onset/Duration: decade ago', had tinea feels this is similar and in the same area (chest and neck area)  Description  Location: right side(size of a lemon); one on his chest (is the size of a quarter)  Character: round  Itching: no  Intensity:  Moderate discoloration  Progression of Symptoms:  " same  Accompanying signs and symptoms:   Fever: no  Body aches or joint pain: no  Sore throat symptoms: no  Recent cold symptoms: no  History:           Previous episodes of similar rash: he was prescribed a cream before and it went away.   New exposures:  None  Recent travel: no  Exposure to similar rash: no  Precipitating or alleviating factors: none  Therapies tried and outcome: alovera gel,     Neck has central clearing.    Chest is solid pink       Video Start Time: 2:35 PM          Objective           Vitals:  No vitals were obtained today due to virtual visit.    Physical Exam     GEN: no distress  PSYCH: Normal affect. Well groomed. Good eye contact.  SKIN: could not discern derm changes with video resolution          ICD-10-CM    1. Tinea corporis  B35.4 clotrimazole (LOTRIMIN) 1 % external cream     Sx are most likely tinea corporis    Clotrimazole 1% cream   Apply to the affected areas twice per day    Use for 5 days after the skin appears normal    Contact me if the areas do not clear up within the next 2 weeks      Video-Visit Details    Type of service:  Video Visit    Video End Time:2:42 PM    Originating Location (pt. Location): Home    Distant Location (provider location):  [facility]     Platform used for Video Visit: Emp[employer]o

## 2021-03-17 DIAGNOSIS — I10 ESSENTIAL HYPERTENSION: ICD-10-CM

## 2021-03-18 RX ORDER — LISINOPRIL 40 MG/1
40 TABLET ORAL DAILY
Qty: 90 TABLET | Refills: 1 | Status: SHIPPED | OUTPATIENT
Start: 2021-03-18 | End: 2021-09-23

## 2021-03-18 NOTE — TELEPHONE ENCOUNTER
Routing refill request to provider for review/approval because:  New medication or adjusted dose. Please review dosing needs.     Marie Hollingsworth RN on 3/18/2021 at 9:56 AM

## 2021-06-15 DIAGNOSIS — I10 ESSENTIAL HYPERTENSION: ICD-10-CM

## 2021-06-16 RX ORDER — METOPROLOL SUCCINATE 200 MG/1
TABLET, EXTENDED RELEASE ORAL
Qty: 90 TABLET | Refills: 0 | Status: SHIPPED | OUTPATIENT
Start: 2021-06-16 | End: 2021-10-18

## 2021-06-16 NOTE — TELEPHONE ENCOUNTER
Prescription approved per [facility] Refill Protocol.    Enedelia Moss RN

## 2021-06-28 ENCOUNTER — MYC MEDICAL ADVICE (OUTPATIENT)
Dept: PEDIATRICS | Facility: CLINIC | Age: 36
End: 2021-06-28

## 2021-07-21 ENCOUNTER — IMMUNIZATION (OUTPATIENT)
Dept: NURSING | Facility: CLINIC | Age: 36
End: 2021-07-21
Payer: COMMERCIAL

## 2021-07-21 PROCEDURE — 91300 PR COVID VAC PFIZER DIL RECON 30 MCG/0.3 ML IM: CPT

## 2021-07-21 PROCEDURE — 0001A PR COVID VAC PFIZER DIL RECON 30 MCG/0.3 ML IM: CPT

## 2021-08-11 ENCOUNTER — IMMUNIZATION (OUTPATIENT)
Dept: NURSING | Facility: CLINIC | Age: 36
End: 2021-08-11
Attending: INTERNAL MEDICINE
Payer: COMMERCIAL

## 2021-08-11 PROCEDURE — 0002A PR COVID VAC PFIZER DIL RECON 30 MCG/0.3 ML IM: CPT

## 2021-08-11 PROCEDURE — 91300 PR COVID VAC PFIZER DIL RECON 30 MCG/0.3 ML IM: CPT

## 2021-09-22 DIAGNOSIS — I10 ESSENTIAL HYPERTENSION: ICD-10-CM

## 2021-09-23 RX ORDER — LISINOPRIL 40 MG/1
40 TABLET ORAL DAILY
Qty: 90 TABLET | Refills: 0 | Status: SHIPPED | OUTPATIENT
Start: 2021-09-23 | End: 2021-12-08

## 2021-09-23 NOTE — TELEPHONE ENCOUNTER
Routing refill request to provider for review/approval because:  Labs not current:  BMP  Failing bp    Enedelia Moss RN

## 2021-09-25 ENCOUNTER — HEALTH MAINTENANCE LETTER (OUTPATIENT)
Age: 36
End: 2021-09-25

## 2021-10-18 ENCOUNTER — MYC REFILL (OUTPATIENT)
Dept: PEDIATRICS | Facility: CLINIC | Age: 36
End: 2021-10-18

## 2021-10-18 DIAGNOSIS — I10 ESSENTIAL HYPERTENSION: ICD-10-CM

## 2021-10-18 RX ORDER — METOPROLOL SUCCINATE 200 MG/1
TABLET, EXTENDED RELEASE ORAL
Qty: 90 TABLET | Refills: 0 | Status: SHIPPED | OUTPATIENT
Start: 2021-10-18 | End: 2022-01-10

## 2021-10-18 NOTE — TELEPHONE ENCOUNTER
Routing refill request to provider for review/approval because:  Ashley given x1 and patient did not follow up, please advise  Patient needs to be seen because it has been more than 1 year since last office visit.  Elevated BP    Patient is out of medication since Sunday morning.     Marie Hollingsworth RN on 10/18/2021 at 4:23 PM

## 2021-11-02 NOTE — TELEPHONE ENCOUNTER
"Caller states he is having severe pain in left upper side area. Caller states he has taken tylenol every 6 hours with no relief. Caller rates pain 9/10. Triage guidelines recommend to go to ED. Caller verbalized and understands directives.    Reason for Disposition    [1] SEVERE pain (e.g., excruciating) AND [2] present > 1 hour    Additional Information    Negative: Shock suspected (e.g., cold/pale/clammy skin, too weak to stand, low BP, rapid pulse)    Negative: Difficult to awaken or acting confused  (e.g., disoriented, slurred speech)    Negative: Passed out (i.e., lost consciousness, collapsed and was not responding)    Negative: Sounds like a life-threatening emergency to the triager    Negative: Chest pain    Pain is mainly in upper abdomen  (if needed ask: \"is it mainly above the belly button?\")    Negative: Severe difficulty breathing (e.g., struggling for each breath, speaks in single words)    Negative: Shock suspected (e.g., cold/pale/clammy skin, too weak to stand, low BP, rapid pulse)    Negative: Difficult to awaken or acting confused  (e.g., disoriented, slurred speech)    Negative: Passed out (i.e., lost consciousness, collapsed and was not responding)    Negative: Visible sweat on face or sweat dripping down face    Negative: Sounds like a life-threatening emergency to the triager    Negative: Followed an abdomen (stomach) injury    Negative: Chest pain    Protocols used: ABDOMINAL PAIN - UPPER-ADULT-, ABDOMINAL PAIN - MALE-ADULT-AH      "
Dr. Olvera

## 2021-11-20 ENCOUNTER — HEALTH MAINTENANCE LETTER (OUTPATIENT)
Age: 36
End: 2021-11-20

## 2021-12-07 DIAGNOSIS — I10 ESSENTIAL HYPERTENSION: ICD-10-CM

## 2021-12-08 RX ORDER — LISINOPRIL 40 MG/1
TABLET ORAL
Qty: 30 TABLET | Refills: 0 | Status: SHIPPED | OUTPATIENT
Start: 2021-12-08 | End: 2022-02-07

## 2021-12-08 NOTE — TELEPHONE ENCOUNTER
Routing refill request to provider for review/approval because:  Labs not current:  creatinine  Patient needs to be seen because it has been more than 1 year since last office visit.  Failing bp    Enedelia Moss RN

## 2021-12-08 NOTE — TELEPHONE ENCOUNTER
Rx filled. Pt did not schedule after his refill in October.  Filled for 30 days. Note added to rx to schedule.

## 2021-12-29 ENCOUNTER — E-VISIT (OUTPATIENT)
Dept: PEDIATRICS | Facility: CLINIC | Age: 36
End: 2021-12-29
Payer: COMMERCIAL

## 2021-12-29 DIAGNOSIS — Z20.822 CLOSE EXPOSURE TO 2019 NOVEL CORONAVIRUS: ICD-10-CM

## 2021-12-29 DIAGNOSIS — Z20.822 CONTACT WITH AND (SUSPECTED) EXPOSURE TO COVID-19: Primary | ICD-10-CM

## 2021-12-29 PROCEDURE — 99421 OL DIG E/M SVC 5-10 MIN: CPT | Performed by: INTERNAL MEDICINE

## 2021-12-29 NOTE — PATIENT INSTRUCTIONS
Dear Maximilian,    Based on your exposure to COVID-19 (coronavirus), we would like to test you for this virus. I have placed an order for this test. The best time for testing is 5-7 days after the exposure.    How to schedule:  Go to your [employer] home page and scroll down to the section that says  You have an appointment that needs to be scheduled  and click the large green button that says  Schedule Now  and follow the steps to find the next available opening.     If you are unable to complete these [employer] scheduling steps, please call [phone] to schedule your testing.     Monoclonal antibody treatment after exposure:  Because you have been exposed to COVID-19, you may be able to receive a treatment with monoclonal antibodies. This treatment can lower your risk of severe illness and going to the hospital. It is given through an IV or under your skin (subcutaneous) and must be given at an infusion center.   To be eligible, you must be 12 years or older, at least 88 pounds and:    Are not fully vaccinated against COVID-19, OR    Are immunocompromised     If you would like to sign up to be considered to receive the monoclonal antibody medicine, please complete a participation form through the [facility] here:  MNRAP ([email or web address]). You may also call the [facility] COVID-19 Public Hotline at 1-198.364.6124 (open Mon-Fri: 9am-7pm and Sat: 10am-6pm).     Not all people who are eligible will receive the medicine since supply is limited. You will be contacted in the next 1 to 2 business days only if you are selected. If you do not receive a call, you have not been selected to receive the medicine. If you have any questions about this medication, please contact your primary care provider. For more information, see [email or web address]    Return to work/school/ guidance:   Please let your workplace manager and  staffing office know when your quarantine ends. We cannot give you an exact date as it depends on the information below. You can calculate this on your own or work with your manager/staffing office to calculate this.    Quarantine Guidelines:  You are considered exposed if you have been within 6 feet of an infected person(s) for 15 minutes or more over a 24-hour period. Quarantine will start after the LAST time you had contact with the infected person while they were contagious (for example, if you saw someone on Monday and Wednesday, your quarantine would start after Wednesday).     If you have NO symptoms (asymptomatic):    Stay home for 14 days (quarantine) after your LAST contact with a person who has COVID-19 (this remains the CDC recommendation for greatest protection against spread of COVID-19), OR    10-day quarantine with no test, OR    Minimum 7-day quarantine with negative RT-PCR test collected on day 5 or later    Quarantine Guideline exceptions:    People who have been fully vaccinated do not need to quarantine unless they have symptoms. You are considered fully vaccinated 2 weeks after your 2nd dose in a 2-dose series or 2 weeks after a single-dose series. This includes vaccinated health care workers.  o Fully vaccinated people should still get tested 5-7 days after exposure, even if they don t have symptoms.   Note: If you have ongoing exposure to the COVID-positive person, this quarantine period may be more than 14 days. For example, if you continue to be exposed to your child who tested positive and cannot isolate from them, then the quarantine of 7-14 days can't start until your child is no longer contagious. This is typically 10 days from onset of the child's symptoms. So, the total duration may be 17-24 days in this case.   Please contact your doctor if you have questions or call the [facility] Public Hotline: [phone] (Mon-Fri: 9am-7pm and Sat: 10am-6pm).     How to Quarantine:     Monitor your  symptoms until 14 days after your last exposure. If you develop signs or symptoms, isolate and get tested (even if you have been tested already).    Stay home and away from others. Don't go to school or anywhere else. Generally, quarantine means staying home from work but there are some exceptions to this. Please contact your workplace. Cover your mouth and nose with a face covering if you must be around other people.     Wash your hands and face often. Use soap and water.    What are the symptoms of COVID-19?  The most common symptoms are cough, fever and trouble breathing. Less common symptoms include headache, body aches, fatigue (feeling very tired), chills, sore throat, stuffy or runny nose, diarrhea (loose poop), loss of taste or smell, belly pain, and nausea or vomiting (feeling sick to your stomach or throwing up).  If you develop symptoms, follow these guidelines:    If you're normally healthy: Please start another eVisit.    If you have a serious health problem (like cancer, heart failure, an organ transplant or kidney disease): Call your specialty clinic. Let them know that you might have COVID-19.    Where can I get more information?    [facility] - About COVID-19: [email or web address]     CDC - What to Do If You're Sick:     www.cdc.gov/coronavirus/2019-ncov/about/steps-when-sick.html    CDC - Ending Home Isolation:  https://www.cdc.gov/coronavirus/2019-ncov/your-health/quarantine-isolation.html    CDC - Caring for Someone:  www.cdc.gov/coronavirus/2019-ncov/if-you-are-sick/care-for-someone.html    [facility] clinical trials (COVID-19 research studies): clinicalaffairs.[facility]/umn-clinical-trials    Below are the COVID-19 hotlines at the [facility] ([facility]). Interpreters are available.  o For health questions: Call 947-189-4600 or 1-809.805.3673 (7 am to 7 pm)  o For questions about schools and childcare: Call 830-380-8850 or 1-216.360.7020 (7 a.m. to 7  p.m.)

## 2021-12-29 NOTE — TELEPHONE ENCOUNTER
There was an opening in the Covid Lab at [place] for 12/30. Was able to schedule him them and informed pt of the date/time change.    Kamala Oh on 12/29/2021 at 3:39 PM

## 2021-12-29 NOTE — TELEPHONE ENCOUNTER
Is there any way we can get patient in for a nurse only appointment for testing tomorrow or Friday? Thanks!    Meryl Mcintyre MD  Internal Medicine-Pediatrics

## 2021-12-30 ENCOUNTER — LAB (OUTPATIENT)
Dept: LAB | Facility: CLINIC | Age: 36
End: 2021-12-30
Attending: INTERNAL MEDICINE
Payer: COMMERCIAL

## 2021-12-30 DIAGNOSIS — Z20.822 CONTACT WITH AND (SUSPECTED) EXPOSURE TO COVID-19: ICD-10-CM

## 2021-12-30 PROCEDURE — U0005 INFEC AGEN DETEC AMPLI PROBE: HCPCS

## 2021-12-30 PROCEDURE — U0003 INFECTIOUS AGENT DETECTION BY NUCLEIC ACID (DNA OR RNA); SEVERE ACUTE RESPIRATORY SYNDROME CORONAVIRUS 2 (SARS-COV-2) (CORONAVIRUS DISEASE [COVID-19]), AMPLIFIED PROBE TECHNIQUE, MAKING USE OF HIGH THROUGHPUT TECHNOLOGIES AS DESCRIBED BY CMS-2020-01-R: HCPCS

## 2021-12-31 LAB — SARS-COV-2 RNA RESP QL NAA+PROBE: NEGATIVE

## 2022-01-05 DIAGNOSIS — I10 ESSENTIAL HYPERTENSION: ICD-10-CM

## 2022-01-10 RX ORDER — METOPROLOL SUCCINATE 200 MG/1
TABLET, EXTENDED RELEASE ORAL
Qty: 90 TABLET | Refills: 0 | Status: SHIPPED | OUTPATIENT
Start: 2022-01-10 | End: 2022-02-09

## 2022-01-10 NOTE — TELEPHONE ENCOUNTER
Routing refill request to provider for review/approval because:  Beta-Blockers Protocol Failed 01/05/2022 12:23 PM   Protocol Details  Blood pressure under 140/90 in past 12 months     BP Readings from Last 1 Encounters:   09/14/20 136/84      Kamala Du RN

## 2022-01-13 ENCOUNTER — NURSE TRIAGE (OUTPATIENT)
Dept: NURSING | Facility: CLINIC | Age: 37
End: 2022-01-13
Payer: COMMERCIAL

## 2022-01-13 ENCOUNTER — OFFICE VISIT (OUTPATIENT)
Dept: URGENT CARE | Facility: URGENT CARE | Age: 37
End: 2022-01-13
Payer: COMMERCIAL

## 2022-01-13 VITALS
BODY MASS INDEX: 57.13 KG/M2 | TEMPERATURE: 99.5 F | RESPIRATION RATE: 20 BRPM | DIASTOLIC BLOOD PRESSURE: 91 MMHG | WEIGHT: 315 LBS | OXYGEN SATURATION: 97 % | SYSTOLIC BLOOD PRESSURE: 139 MMHG | HEART RATE: 132 BPM

## 2022-01-13 DIAGNOSIS — I10 UNCONTROLLED HYPERTENSION: ICD-10-CM

## 2022-01-13 DIAGNOSIS — R11.10 INTRACTABLE VOMITING, PRESENCE OF NAUSEA NOT SPECIFIED, UNSPECIFIED VOMITING TYPE: Primary | ICD-10-CM

## 2022-01-13 LAB
ALBUMIN SERPL-MCNC: 3.8 G/DL (ref 3.4–5)
ALP SERPL-CCNC: 80 U/L (ref 40–150)
ALT SERPL W P-5'-P-CCNC: 66 U/L
ANION GAP SERPL CALCULATED.3IONS-SCNC: 5 MMOL/L (ref 3–14)
AST SERPL W P-5'-P-CCNC: 38 U/L (ref 0–45)
BASOPHILS # BLD AUTO: 0 10E3/UL (ref 0–0.2)
BASOPHILS NFR BLD AUTO: 0 %
BILIRUB SERPL-MCNC: 1.1 MG/DL (ref 0.2–1.3)
BUN SERPL-MCNC: 10 MG/DL (ref 7–30)
CALCIUM SERPL-MCNC: 9.5 MG/DL (ref 8.5–10.1)
CHLORIDE BLD-SCNC: 106 MMOL/L (ref 94–109)
CO2 SERPL-SCNC: 28 MMOL/L (ref 20–32)
CREAT SERPL-MCNC: 1 MG/DL (ref 0.66–1.25)
EOSINOPHIL # BLD AUTO: 0 10E3/UL (ref 0–0.7)
EOSINOPHIL NFR BLD AUTO: 0 %
ERYTHROCYTE [DISTWIDTH] IN BLOOD BY AUTOMATED COUNT: 12.8 % (ref 10–15)
GFR SERPL CREATININE-BSD FRML MDRD: >90 ML/MIN/1.73M2
GLUCOSE BLD-MCNC: 136 MG/DL (ref 70–99)
HCT VFR BLD AUTO: 50.1 % (ref 40–53)
HGB BLD-MCNC: 16.9 G/DL (ref 13.3–17.7)
IMM GRANULOCYTES # BLD: 0.1 10E3/UL
IMM GRANULOCYTES NFR BLD: 1 %
LIPASE SERPL-CCNC: 67 U/L (ref 73–393)
LYMPHOCYTES # BLD AUTO: 0.4 10E3/UL (ref 0.8–5.3)
LYMPHOCYTES NFR BLD AUTO: 4 %
MCH RBC QN AUTO: 30.6 PG (ref 26.5–33)
MCHC RBC AUTO-ENTMCNC: 33.7 G/DL (ref 31.5–36.5)
MCV RBC AUTO: 91 FL (ref 78–100)
MONOCYTES # BLD AUTO: 0.4 10E3/UL (ref 0–1.3)
MONOCYTES NFR BLD AUTO: 5 %
NEUTROPHILS # BLD AUTO: 7.8 10E3/UL (ref 1.6–8.3)
NEUTROPHILS NFR BLD AUTO: 90 %
NRBC # BLD AUTO: 0 10E3/UL
NRBC BLD AUTO-RTO: 0 /100
PLATELET # BLD AUTO: 267 10E3/UL (ref 150–450)
POTASSIUM BLD-SCNC: 4.3 MMOL/L (ref 3.4–5.3)
PROT SERPL-MCNC: 7.5 G/DL (ref 6.8–8.8)
RBC # BLD AUTO: 5.53 10E6/UL (ref 4.4–5.9)
SODIUM SERPL-SCNC: 139 MMOL/L (ref 133–144)
WBC # BLD AUTO: 8.6 10E3/UL (ref 4–11)

## 2022-01-13 PROCEDURE — 99214 OFFICE O/P EST MOD 30 MIN: CPT | Performed by: FAMILY MEDICINE

## 2022-01-13 PROCEDURE — 85025 COMPLETE CBC W/AUTO DIFF WBC: CPT | Performed by: FAMILY MEDICINE

## 2022-01-13 PROCEDURE — 83690 ASSAY OF LIPASE: CPT | Performed by: FAMILY MEDICINE

## 2022-01-13 PROCEDURE — 36415 COLL VENOUS BLD VENIPUNCTURE: CPT | Performed by: FAMILY MEDICINE

## 2022-01-13 PROCEDURE — 80053 COMPREHEN METABOLIC PANEL: CPT | Performed by: FAMILY MEDICINE

## 2022-01-13 PROCEDURE — 82150 ASSAY OF AMYLASE: CPT | Performed by: FAMILY MEDICINE

## 2022-01-13 PROCEDURE — U0003 INFECTIOUS AGENT DETECTION BY NUCLEIC ACID (DNA OR RNA); SEVERE ACUTE RESPIRATORY SYNDROME CORONAVIRUS 2 (SARS-COV-2) (CORONAVIRUS DISEASE [COVID-19]), AMPLIFIED PROBE TECHNIQUE, MAKING USE OF HIGH THROUGHPUT TECHNOLOGIES AS DESCRIBED BY CMS-2020-01-R: HCPCS | Performed by: FAMILY MEDICINE

## 2022-01-13 RX ORDER — ONDANSETRON 4 MG/1
4-8 TABLET, ORALLY DISINTEGRATING ORAL EVERY 8 HOURS PRN
Qty: 12 TABLET | Refills: 0 | Status: SHIPPED | OUTPATIENT
Start: 2022-01-13 | End: 2022-12-21

## 2022-01-13 RX ORDER — ONDANSETRON 8 MG/1
8 TABLET, ORALLY DISINTEGRATING ORAL ONCE
Status: COMPLETED | OUTPATIENT
Start: 2022-01-13 | End: 2022-01-13

## 2022-01-13 RX ADMIN — ONDANSETRON 4 MG: 8 TABLET, ORALLY DISINTEGRATING ORAL at 13:02

## 2022-01-13 NOTE — TELEPHONE ENCOUNTER
"New symptom onsetting 12 hours ago:  - \"vomiting\"  Initially occurred \"every 5-to-10 mins for the first three hours.\"  \"Then once per 45 mins.\"  \"Then dry-heaving over the past 2-to-3 hours.\"  Pt exhibits high anxiety.    Tried OTC Pepto Bismol without benefit.  Has not taken AM meds for BP.  Concerned about vomiting these meds and thereby wasting them (metoprolol and lisinopril).  Reports BP earlier today 200/80.  Asked pt to recheck BP now ...  Current /88.    \"Little bit of diarrhea over the past three hours.\"    Had covid exposure within the past month.  Negative test two weeks ago (12/30/21).  Person with covid had upper resp sxs;  no GI sxs.    Taking sips of water.  \"Feeling tired.\"  Headache -> rated \"5-to-6/10.\"  Last urinated four hours ago.    Plan of action as follows:  - seek urgent care eval within the next 1.5 hours  - goal is Rx to control nausea so that pt can tolerate BP meds  - seek covid testing  Pt agrees to plan.    Nicole [facility] Nurse Advisor     Reason for Disposition    Systolic BP >= 160 OR Diastolic >= 100, and any cardiac or neurologic symptoms (e.g., chest pain, difficulty breathing, unsteady gait, blurred vision)     Headache, vomiting (subsiding)    Patient wants to be seen    Additional Information    Negative: Shock suspected (e.g., cold/pale/clammy skin, too weak to stand, low BP, rapid pulse)    Negative: Difficult to awaken or acting confused (e.g., disoriented, slurred speech)    Negative: Sounds like a life-threatening emergency to the triager    Negative: Vomiting occurs only while coughing    Negative: Pregnant < 20 Weeks and nausea/vomiting began in early pregnancy (i.e., 4-8 weeks pregnant)    Negative: Chest pain    Negative: Headache is main symptom    Negative: SEVERE vomiting (e.g., 6 or more times/day) (Exception: patient sounds well, is drinking liquids, does not sound dehydrated, and vomiting has lasted less than 24 hours)    Negative: MODERATE vomiting " (e.g., 3 - 5 times/day) and age > 60    Negative: Vomiting contains bile (green color)    Negative: Vomiting red blood or black (coffee ground) material    Negative: Insulin-dependent diabetes and glucose > 240 mg/dL (13 mmol/L)    Negative: Recent head injury (within 3 days)    Negative: Recent abdominal injury (within 7 days)    Negative: Drinking very little and has signs of dehydration (e.g., no urine > 12 hours, very dry mouth, very lightheaded)    Negative: Constant abdominal pain lasting > 2 hours    Negative: High-risk adult (e.g., brain tumor, V-P shunt, hernia)    Negative: Severe pain in one eye    Negative: Patient sounds very sick or weak to the triager    Negative: Vomiting and abdomen looks much more swollen than usual    Negative: Fever > 103 F (39.4 C)    Negative: Fever > 101 F (38.3 C) and over 60 years of age    Negative: Fever > 100.0 F (37.8 C) and has a weak immune system (e.g., HIV positive, cancer chemo, organ transplant, splenectomy, chronic steroids)    Negative: Fever > 100.0 F (37.8 C) and bedridden (e.g., nursing home patient, stroke, chronic illness, recovering from surgery)    Negative: Taking any of the following medications: digoxin (Lanoxin), lithium, theophylline, phenytoin (Dilantin)    Negative: SEVERE headache and vomiting    Negative: Sounds like a life-threatening emergency to the triager    Negative: Pregnant > 20 weeks or postpartum (< 6 weeks after delivery) and new hand or face swelling    Negative: Pregnant > 20 weeks and BP > 140/90    Negative: MILD to MODERATE vomiting (e.g., 1-5 times/day) and lasts > 48 hours (2 days)    Negative: Fever present > 3 days (72 hours)    Protocols used: HIGH BLOOD PRESSURE-A-OH, VOMITING-A-OH    ___________________    COVID 19 Nurse Triage Plan/Patient Instructions    Please be aware that novel coronavirus (COVID-19) may be circulating in the community. If you develop symptoms such as fever, cough, or SOB or if you have concerns about  "the presence of another infection including coronavirus (COVID-19), please contact your health care provider or visit [email or web address].     Disposition/Instructions    Additional COVID19 information to add for patients.   How can I protect others?  If you have symptoms (fever, cough, body aches or trouble breathing): Stay home and away from others (self-isolate) until:    At least 10 days have passed since your symptoms started, And     You ve had no fever--and no medicine that reduces fever--for 1 full day (24 hours), And      Your other symptoms have resolved (gotten better).     If you don t have symptoms, but a test showed that you have COVID-19 (you tested positive):    Stay home and away from others (self-isolate). Follow the tips under \"How do I self-isolate?\" below for 10 days (20 days if you have a weak immune system).    You don't need to be retested for COVID-19 before going back to school or work. As long as you're fever-free and feeling better, you can go back to school, work and other activities after waiting the 10 or 20 days.     How do I self-isolate?    Stay in your own room, even for meals. Use your own bathroom if you can.     Stay away from others in your home. No hugging, kissing or shaking hands. No visitors.    Don t go to work, school or anywhere else.     Clean  high touch  surfaces often (doorknobs, counters, handles, etc.). Use a household cleaning spray or wipes. You ll find a full list on the EPA website:  www.epa.gov/pesticide-registration/list-n-disinfectants-use-against-sars-cov-2.    Cover your mouth and nose with a mask, tissue or washcloth to avoid spreading germs.    Wash your hands and face often. Use soap and water.    Caregivers in these groups are at risk for severe illness due to COVID-19:  o People 65 years and older  o People who live in a nursing home or long-term care facility  o People with chronic disease (lung, heart, cancer, diabetes, kidney, liver, " immunologic)  o People who have a weakened immune system, including those who:  - Are in cancer treatment  - Take medicine that weakens the immune system, such as corticosteroids  - Had a bone marrow or organ transplant  - Have an immune deficiency  - Have poorly controlled HIV or AIDS  - Are obese (body mass index of 40 or higher)  - Smoke regularly    Caregivers should wear gloves while washing dishes, handling laundry and cleaning bedrooms and bathrooms.    Use caution when washing and drying laundry: Don t shake dirty laundry, and use the warmest water setting that you can.    For more tips, go to www.cdc.gov/coronavirus/2019-ncov/downloads/10Things.pdf.    How can I take care of myself?  1. Get lots of rest. Drink extra fluids (unless a doctor has told you not to).     2. Take Tylenol (acetaminophen) for fever or pain. If you have liver or kidney problems, ask your family doctor if it s okay to take Tylenol.     Adults can take either:     650 mg (two 325 mg pills) every 4 to 6 hours, or     1,000 mg (two 500 mg pills) every 8 hours as needed.     Note: Don t take more than 3,000 mg in one day.   Acetaminophen is found in many medicines (both prescribed and over-the-counter medicines). Read all labels to be sure you don t take too much.     For children, check the Tylenol bottle for the right dose. The dose is based on the child s age or weight.    3. If you have other health problems (like cancer, heart failure, an organ transplant or severe kidney disease): Call your specialty clinic if you don t feel better in the next 2 days.    4. Know when to call 911: Emergency warning signs include:    Trouble breathing or shortness of breath    Pain or pressure in the chest that doesn t go away    Feeling confused like you haven t felt before, or not being able to wake up    Bluish-colored lips or face    What are the symptoms of COVID-19?     The most common symptoms are cough, fever and trouble breathing.     Less  common symptoms include body aches, chills, diarrhea (loose, watery poops), fatigue (feeling very tired), headache, runny nose, sore throat and loss of smell.    COVID-19 can cause severe coughing (bronchitis) and lung infection (pneumonia).    How does it spread?     The virus may spread when a person coughs or sneezes into the air. The virus can travel about 6 feet this way, and it can live on surfaces.      Common  (household disinfectants) will kill the virus.    Who is at risk?  Anyone can catch COVID-19 if they re around someone who has the virus.    How can others protect themselves?     Stay away from people who have COVID-19 (or symptoms of COVID-19).    Wash hands often with soap and water. Or, use hand  with at least 60% alcohol.    Avoid touching the eyes, nose or mouth.     Wear a face mask when you go out in public, when sick or when caring for a sick person.    Where can I get more information?    [facility]: About COVID-19: [email or web address]    CDC: What to Do If You re Sick: www.cdc.gov/coronavirus/2019-ncov/about/steps-when-sick.html    CDC: Ending Home Isolation: www.cdc.gov/coronavirus/2019-ncov/hcp/disposition-in-home-patients.html     CDC: Caring for Someone: www.cdc.gov/coronavirus/2019-ncov/if-you-are-sick/care-for-someone.html     [facility]: Interim Guidance for Hospital Discharge to Home: www.health.[facility].mn.us/diseases/coronavirus/hcp/hospdischarge.pdf    [facility] clinical trials (COVID-19 research studies): clinicalaffairs.[facility]/umn-clinical-trials     Below are the COVID-19 hotlines at the [place] Department of Health ([facility]). Interpreters are available.   o For health questions: Call 300-739-7484 or 1-146.538.6516 (7 a.m. to 7 p.m.)  o For questions about schools and childcare: Call 230-389-6108 or 1-970.415.6528 (7 a.m. to 7 p.m.)          Thank you for taking steps to prevent the spread of this virus.  o Limit your contact with  others.  o Wear a simple mask to cover your cough.  o Wash your hands well and often.    Resources    [facility]: About COVID-19: [email or web address]    CDC: What to Do If You're Sick: www.cdc.gov/coronavirus/2019-ncov/about/steps-when-sick.html    CDC: Ending Home Isolation: www.cdc.gov/coronavirus/2019-ncov/hcp/disposition-in-home-patients.html     CDC: Caring for Someone: www.cdc.gov/coronavirus/2019-ncov/if-you-are-sick/care-for-someone.html     [facility]: Interim Guidance for Hospital Discharge to Home: www.[facility].mn.us/diseases/coronavirus/hcp/hospdischarge.pdf    [facility] clinical trials (COVID-19 research studies): clinicalaffairs.[facility]-clinical-trials     Below are the COVID-19 hotlines at the [place] Department of Health ([facility]). Interpreters are available.   o For health questions: Call 664-865-2483 or 1-540.642.5777 (7 a.m. to 7 p.m.)  o For questions about schools and childcare: Call 148-755-2763 or 1-288.527.7676 (7 a.m. to 7 p.m.)

## 2022-01-13 NOTE — PROGRESS NOTES
"SUBJECTIVE:   Keo Pinto is a 36 year old male presenting with a chief complaint of vomiting and uncontrolled BP, directed to  from triage.    Reviewed triage note:  New symptom onsetting 12 hours ago:  - \"vomiting\"  Initially occurred \"every 5-to-10 mins for the first three hours.\"  \"Then once per 45 mins.\"  \"Then dry-heaving over the past 2-to-3 hours.\"  Pt exhibits high anxiety.     Tried OTC Pepto Bismol without benefit.  Has not taken AM meds for BP.  Concerned about vomiting these meds and thereby wasting them (metoprolol and lisinopril).  Reports BP earlier today 200/80.  Asked pt to recheck BP now ...  Current /88.     \"Little bit of diarrhea over the past three hours.\"     Had covid exposure within the past month.  Negative test two weeks ago (12/30/21).  Person with covid had upper resp sxs;  no GI sxs.     Taking sips of water.  \"Feeling tired.\"  Headache -> rated \"5-to-6/10.\"  Last urinated four hours ago.    Family positive - this was around holidays  COVID screen 12/20 - negative    No COVID positive exposure before vomiting started, denies any fever.  Dad had felt unwell couple of days ago, lasted a day and he is feeling much better    Denies any GERD symptoms.  No cough or congestion.    Completed Pfizer COVID vaccination in August    Past Medical History:   Diagnosis Date     Esophageal reflux      Gallstone pancreatitis 2/28/2019     Hypertension      Migraine headache      Obese      Current Outpatient Medications   Medication Sig Dispense Refill     ALPRAZolam (XANAX) 0.5 MG tablet Take 1 tablet (0.5 mg) by mouth 3 times daily as needed for anxiety 30 tablet 0     clotrimazole (LOTRIMIN) 1 % external cream Apply topically 2 times daily 60 g 2     lisinopril (ZESTRIL) 40 MG tablet TAKE 1 TABLET BY MOUTH DAILY OFFICE VISIT NEEDED PRIOR TO REFILS 30 tablet 0     metoprolol succinate ER (TOPROL-XL) 200 MG 24 hr tablet TAKE 1 TABLET BY MOUTH DAILY 90 tablet 0     Social History "     Tobacco Use     Smoking status: Never Smoker     Smokeless tobacco: Never Used   Substance Use Topics     Alcohol use: Yes     Comment: A beer or two a couple of times a month       ROS:  Review of systems negative except as stated above.    OBJECTIVE:  BP (!) 145/97 (BP Location: Right arm, Patient Position: Chair, Cuff Size: Adult Large)   Pulse (!) 145   Temp 99.5  F (37.5  C) (Oral)   Resp 20   Wt (!) 189.6 kg (418 lb 1.6 oz)   SpO2 97%   BMI 57.13 kg/m    GENERAL APPEARANCE: healthy, alert and mild distress  EYES: EOMI,  PERRL, conjunctiva clear  RESP: lungs with no audible wheezes or increase work of breathing  ABDOMEN:  soft, nontender  PSYCH: mentation appears normal and affect normal/bright    Results for orders placed or performed in visit on 01/13/22   Comprehensive metabolic panel (BMP + Alb, Alk Phos, ALT, AST, Total. Bili, TP)     Status: Abnormal   Result Value Ref Range    Sodium 139 133 - 144 mmol/L    Potassium 4.3 3.4 - 5.3 mmol/L    Chloride 106 94 - 109 mmol/L    Carbon Dioxide (CO2) 28 20 - 32 mmol/L    Anion Gap 5 3 - 14 mmol/L    Urea Nitrogen 10 7 - 30 mg/dL    Creatinine 1.00 0.66 - 1.25 mg/dL    Calcium 9.5 8.5 - 10.1 mg/dL    Glucose 136 (H) 70 - 99 mg/dL    Alkaline Phosphatase 80 40 - 150 U/L    AST 38 0 - 45 U/L    ALT 66 U/L    Protein Total 7.5 6.8 - 8.8 g/dL    Albumin 3.8 3.4 - 5.0 g/dL    Bilirubin Total 1.1 0.2 - 1.3 mg/dL    GFR Estimate >90 >60 mL/min/1.73m2   Lipase     Status: Abnormal   Result Value Ref Range    Lipase 67 (L) 73 - 393 U/L   CBC with platelets and differential     Status: Abnormal   Result Value Ref Range    WBC Count 8.6 4.0 - 11.0 10e3/uL    RBC Count 5.53 4.40 - 5.90 10e6/uL    Hemoglobin 16.9 13.3 - 17.7 g/dL    Hematocrit 50.1 40.0 - 53.0 %    MCV 91 78 - 100 fL    MCH 30.6 26.5 - 33.0 pg    MCHC 33.7 31.5 - 36.5 g/dL    RDW 12.8 10.0 - 15.0 %    Platelet Count 267 150 - 450 10e3/uL    % Neutrophils 90 %    % Lymphocytes 4 %    % Monocytes 5 %     % Eosinophils 0 %    % Basophils 0 %    % Immature Granulocytes 1 %    NRBCs per 100 WBC 0 <1 /100    Absolute Neutrophils 7.8 1.6 - 8.3 10e3/uL    Absolute Lymphocytes 0.4 (L) 0.8 - 5.3 10e3/uL    Absolute Monocytes 0.4 0.0 - 1.3 10e3/uL    Absolute Eosinophils 0.0 0.0 - 0.7 10e3/uL    Absolute Basophils 0.0 0.0 - 0.2 10e3/uL    Absolute Immature Granulocytes 0.1 <=0.4 10e3/uL    Absolute NRBCs 0.0 10e3/uL   CBC with platelets and differential     Status: Abnormal    Narrative    The following orders were created for panel order CBC with platelets and differential.  Procedure                               Abnormality         Status                     ---------                               -----------         ------                     CBC with platelets and d...[[id number]]  Abnormal            Final result                 Please view results for these tests on the individual orders.       ASSESSMENT/PLAN:  (R11.10) Intractable vomiting, presence of nausea not specified, unspecified vomiting type  (primary encounter diagnosis)  Plan: ondansetron (ZOFRAN-ODT) ODT tab 8 mg, CBC with        platelets and differential, Comprehensive         metabolic panel (BMP + Alb, Alk Phos, ALT, AST,        Total. Bili, TP), Lipase, Amylase, Symptomatic;        Unknown COVID-19 Virus (Coronavirus) by PCR         Nose, ondansetron (ZOFRAN ODT) 4 MG ODT tab,         Symptomatic; Unknown COVID-19 Virus         (Coronavirus) by PCR Nose            (I10) Uncontrolled hypertension  Comment: improving  Plan: continue with medications      Zofran 8 mg ODT given in clinic with mild improvement of symptoms.  Patient able to keep fluids down.  Reviewed initial labs and will follow up on pending labs and notify if any abnormalities.  Discussed that if unable to keep any fluids down that will need to be seen in ER due to concerns for uncontrolled HTN and dehydration from excessive vomiting.  RX zofran given to help with nausea/vomiting  symptoms.  COVID screen obtained as symptoms overlap with COVID infection, reviewed quarantine guidelines.    Follow up with primary provider if no improvement of symptoms in 1-2 days.    Billy Mendez MD

## 2022-01-13 NOTE — PROGRESS NOTES
Clinic Administered Medication Documentation    Administrations This Visit     ondansetron (ZOFRAN-ODT) ODT tab 8 mg     Admin Date  01/13/2022 Action  Given Dose  4 mg Route  Oral Site   Administered By  Maricel Valenzuela    Ordering Provider: Billy Mendez MD    Patient Supplied?: No                Oral Medication Documentation    Patient was given Ondansetron (Zofran) . Prior to medication administration, verified patients identity using patient s name and date of birth. Please see MAR and medication order for additional information.     Was entire amount of medication used? Yes, 2 4mg tab given   Expiration Date: 05/2024     GUSTAVO Hernández

## 2022-01-14 LAB
AMYLASE SERPL-CCNC: 42 U/L (ref 30–110)
SARS-COV-2 RNA RESP QL NAA+PROBE: NORMAL
SARS-COV-2 RNA RESP QL NAA+PROBE: NOT DETECTED

## 2022-02-06 ENCOUNTER — MYC MEDICAL ADVICE (OUTPATIENT)
Dept: PEDIATRICS | Facility: CLINIC | Age: 37
End: 2022-02-06
Payer: COMMERCIAL

## 2022-02-06 DIAGNOSIS — I10 ESSENTIAL HYPERTENSION: ICD-10-CM

## 2022-02-06 SDOH — ECONOMIC STABILITY: TRANSPORTATION INSECURITY
IN THE PAST 12 MONTHS, HAS LACK OF TRANSPORTATION KEPT YOU FROM MEETINGS, WORK, OR FROM GETTING THINGS NEEDED FOR DAILY LIVING?: NO

## 2022-02-06 SDOH — ECONOMIC STABILITY: FOOD INSECURITY: WITHIN THE PAST 12 MONTHS, YOU WORRIED THAT YOUR FOOD WOULD RUN OUT BEFORE YOU GOT MONEY TO BUY MORE.: NEVER TRUE

## 2022-02-06 SDOH — ECONOMIC STABILITY: INCOME INSECURITY: IN THE LAST 12 MONTHS, WAS THERE A TIME WHEN YOU WERE NOT ABLE TO PAY THE MORTGAGE OR RENT ON TIME?: NO

## 2022-02-06 SDOH — HEALTH STABILITY: PHYSICAL HEALTH: ON AVERAGE, HOW MANY DAYS PER WEEK DO YOU ENGAGE IN MODERATE TO STRENUOUS EXERCISE (LIKE A BRISK WALK)?: 2 DAYS

## 2022-02-06 SDOH — ECONOMIC STABILITY: INCOME INSECURITY: HOW HARD IS IT FOR YOU TO PAY FOR THE VERY BASICS LIKE FOOD, HOUSING, MEDICAL CARE, AND HEATING?: NOT VERY HARD

## 2022-02-06 SDOH — HEALTH STABILITY: PHYSICAL HEALTH: ON AVERAGE, HOW MANY MINUTES DO YOU ENGAGE IN EXERCISE AT THIS LEVEL?: 20 MIN

## 2022-02-06 SDOH — ECONOMIC STABILITY: TRANSPORTATION INSECURITY
IN THE PAST 12 MONTHS, HAS THE LACK OF TRANSPORTATION KEPT YOU FROM MEDICAL APPOINTMENTS OR FROM GETTING MEDICATIONS?: NO

## 2022-02-06 SDOH — ECONOMIC STABILITY: FOOD INSECURITY: WITHIN THE PAST 12 MONTHS, THE FOOD YOU BOUGHT JUST DIDN'T LAST AND YOU DIDN'T HAVE MONEY TO GET MORE.: NEVER TRUE

## 2022-02-06 ASSESSMENT — ENCOUNTER SYMPTOMS
COUGH: 0
MYALGIAS: 0
JOINT SWELLING: 0
CHILLS: 0
DIZZINESS: 0
SORE THROAT: 0
PARESTHESIAS: 0
WEAKNESS: 1
DIARRHEA: 0
SHORTNESS OF BREATH: 0
DYSURIA: 0
PALPITATIONS: 0
ABDOMINAL PAIN: 0
ARTHRALGIAS: 0
NERVOUS/ANXIOUS: 0
HEARTBURN: 0
FREQUENCY: 0
HEADACHES: 0
FEVER: 0
HEMATOCHEZIA: 0
HEMATURIA: 0
NAUSEA: 0
EYE PAIN: 0
CONSTIPATION: 0

## 2022-02-06 ASSESSMENT — SOCIAL DETERMINANTS OF HEALTH (SDOH)
HOW OFTEN DO YOU GET TOGETHER WITH FRIENDS OR RELATIVES?: TWICE A WEEK
HOW OFTEN DO YOU ATTEND CHURCH OR RELIGIOUS SERVICES?: NEVER
IN A TYPICAL WEEK, HOW MANY TIMES DO YOU TALK ON THE PHONE WITH FAMILY, FRIENDS, OR NEIGHBORS?: MORE THAN THREE TIMES A WEEK
ARE YOU MARRIED, WIDOWED, DIVORCED, SEPARATED, NEVER MARRIED, OR LIVING WITH A PARTNER?: NEVER MARRIED
DO YOU BELONG TO ANY CLUBS OR ORGANIZATIONS SUCH AS CHURCH GROUPS UNIONS, FRATERNAL OR ATHLETIC GROUPS, OR SCHOOL GROUPS?: NO

## 2022-02-06 ASSESSMENT — LIFESTYLE VARIABLES
HOW OFTEN DO YOU HAVE A DRINK CONTAINING ALCOHOL: 2-4 TIMES A MONTH
HOW MANY STANDARD DRINKS CONTAINING ALCOHOL DO YOU HAVE ON A TYPICAL DAY: 1 OR 2
HOW OFTEN DO YOU HAVE SIX OR MORE DRINKS ON ONE OCCASION: NEVER

## 2022-02-07 RX ORDER — LISINOPRIL 40 MG/1
TABLET ORAL
Qty: 30 TABLET | Refills: 0 | Status: SHIPPED | OUTPATIENT
Start: 2022-02-07 | End: 2022-02-09

## 2022-02-07 NOTE — TELEPHONE ENCOUNTER
Routing refill request to provider for review/approval because:  Failing bp    Enedelia Msos, RN

## 2022-02-08 NOTE — PROGRESS NOTES
Pt is active on [facility] & viewed appointment reminder.     Last px was on 9/14/20. Due for Lipids.    Pt had CMP done during UC visit on 1/13/22 & glucose was high(136 - possibly not fasting). So, pended lipids & glucose.    Boris RN  Patient Advocate Liason (PAL)  [facility]

## 2022-02-09 ENCOUNTER — OFFICE VISIT (OUTPATIENT)
Dept: PEDIATRICS | Facility: CLINIC | Age: 37
End: 2022-02-09
Payer: COMMERCIAL

## 2022-02-09 VITALS
RESPIRATION RATE: 14 BRPM | WEIGHT: 315 LBS | HEIGHT: 72 IN | HEART RATE: 68 BPM | DIASTOLIC BLOOD PRESSURE: 86 MMHG | TEMPERATURE: 96.3 F | SYSTOLIC BLOOD PRESSURE: 132 MMHG | BODY MASS INDEX: 42.66 KG/M2 | OXYGEN SATURATION: 98 %

## 2022-02-09 DIAGNOSIS — E66.01 MORBID OBESITY (H): ICD-10-CM

## 2022-02-09 DIAGNOSIS — I10 ESSENTIAL HYPERTENSION: ICD-10-CM

## 2022-02-09 DIAGNOSIS — Z00.00 ROUTINE GENERAL MEDICAL EXAMINATION AT A HEALTH CARE FACILITY: Primary | ICD-10-CM

## 2022-02-09 DIAGNOSIS — F41.1 ANXIETY STATE: ICD-10-CM

## 2022-02-09 DIAGNOSIS — Z23 NEED FOR VACCINATION: ICD-10-CM

## 2022-02-09 PROCEDURE — 99395 PREV VISIT EST AGE 18-39: CPT | Performed by: INTERNAL MEDICINE

## 2022-02-09 PROCEDURE — 0054A COVID-19,PF,PFIZER (12+ YRS): CPT | Performed by: INTERNAL MEDICINE

## 2022-02-09 PROCEDURE — 91305 COVID-19,PF,PFIZER (12+ YRS): CPT | Performed by: INTERNAL MEDICINE

## 2022-02-09 RX ORDER — METOPROLOL SUCCINATE 200 MG/1
TABLET, EXTENDED RELEASE ORAL
Qty: 90 TABLET | Refills: 4 | Status: SHIPPED | OUTPATIENT
Start: 2022-02-09 | End: 2023-03-31

## 2022-02-09 RX ORDER — LISINOPRIL 40 MG/1
40 TABLET ORAL DAILY
Qty: 90 TABLET | Refills: 4 | Status: SHIPPED | OUTPATIENT
Start: 2022-02-09 | End: 2023-03-31

## 2022-02-09 ASSESSMENT — ENCOUNTER SYMPTOMS
FREQUENCY: 0
EYE PAIN: 0
SHORTNESS OF BREATH: 0
DIZZINESS: 0
PARESTHESIAS: 0
HEARTBURN: 0
CONSTIPATION: 0
SORE THROAT: 0
DIARRHEA: 0
HEMATURIA: 0
MYALGIAS: 0
DYSURIA: 0
ABDOMINAL PAIN: 0
JOINT SWELLING: 0
CHILLS: 0
FEVER: 0
HEADACHES: 0
PALPITATIONS: 0
NERVOUS/ANXIOUS: 0
WEAKNESS: 1
COUGH: 0
HEMATOCHEZIA: 0
NAUSEA: 0
ARTHRALGIAS: 0

## 2022-02-09 ASSESSMENT — MIFFLIN-ST. JEOR: SCORE: 2854.05

## 2022-02-09 NOTE — PROGRESS NOTES
SUBJECTIVE:   CC: Keo Pinto is an 36 year old male who presents for preventative health visit.     Patient has been advised of split billing requirements and indicates understanding: Yes  Healthy Habits:     Getting at least 3 servings of Calcium per day:  Yes    Bi-annual eye exam:  NO    Dental care twice a year:  NO    Sleep apnea or symptoms of sleep apnea:  Daytime drowsiness    Diet:  Low salt    Frequency of exercise:  2-3 days/week    Duration of exercise:  15-30 minutes    Taking medications regularly:  Yes    PHQ-2 Total Score: 0    Additional concerns today:  Yes    HTN. No cardiac sx such as CP, palpitations, PND, orthopnea, PISANO or peripheral edema.  BP Readings from Last 3 Encounters:   02/09/22 132/86   01/13/22 (!) 139/91   09/14/20 136/84     Obesity. Reviewed benefits of weight loss and methods to help achieve.  Wt Readings from Last 4 Encounters:   02/09/22 (!) 189.1 kg (416 lb 14.4 oz)   01/13/22 (!) 189.6 kg (418 lb 1.6 oz)   09/14/20 (!) 187.3 kg (413 lb)   07/29/20 (!) 183.7 kg (405 lb)     Anxiety. Overall feels as his sx are controlled.  Does take alprazolam prn.     Today's PHQ-2 Score:   PHQ-2 ( 1999 Pfizer) 2/6/2022   Q1: Little interest or pleasure in doing things 0   Q2: Feeling down, depressed or hopeless 0   PHQ-2 Score 0   PHQ-2 Total Score (12-17 Years)- Positive if 3 or more points; Administer PHQ-A if positive -   Q1: Little interest or pleasure in doing things Not at all   Q2: Feeling down, depressed or hopeless Not at all   PHQ-2 Score 0       Abuse: Current or Past(Physical, Sexual or Emotional)- No  Do you feel safe in your environment? Yes    Have you ever done Advance Care Planning? (For example, a Health Directive, POLST, or a discussion with a medical provider or your loved ones about your wishes): No, advance care planning information given to patient to review.  Patient declined advance care planning discussion at this time.    Social History     Tobacco Use      Smoking status: Never Smoker     Smokeless tobacco: Never Used   Substance Use Topics     Alcohol use: Not Currently     Comment: A beer or two a couple of times a month     If you drink alcohol do you typically have >3 drinks per day or >7 drinks per week? No    Alcohol Use 2/6/2022   Prescreen: >3 drinks/day or >7 drinks/week? No   Prescreen: >3 drinks/day or >7 drinks/week? -       Reviewed orders with patient. Reviewed health maintenance and updated orders accordingly - Yes      Review of Systems   Constitutional: Negative for chills and fever.   HENT: Negative for congestion, ear pain, hearing loss and sore throat.    Eyes: Negative for pain and visual disturbance.   Respiratory: Negative for cough and shortness of breath.    Cardiovascular: Negative for chest pain, palpitations and peripheral edema.   Gastrointestinal: Negative for abdominal pain, constipation, diarrhea, heartburn, hematochezia and nausea.   Genitourinary: Negative for dysuria, frequency, genital sores, hematuria, impotence, penile discharge and urgency.   Musculoskeletal: Negative for arthralgias, joint swelling and myalgias.   Skin: Negative for rash.   Neurological: Positive for weakness. Negative for dizziness, headaches and paresthesias.   Psychiatric/Behavioral: Negative for mood changes. The patient is not nervous/anxious.        OBJECTIVE:   /86   Pulse 68   Temp (!) 96.3  F (35.7  C) (Temporal)   Resp 14   Ht 1.829 m (6')   Wt (!) 189.1 kg (416 lb 14.4 oz)   SpO2 98%   BMI 56.54 kg/m      Physical Exam  GENERAL: healthy, alert and no distress  EYES: PERRL, EOMI  HENT: ear canals and TM's normal  NECK: no adenopathy  RESP: lungs clear to auscultation - no rales, rhonchi or wheezes  CV: regular rate and rhythm, normal S1 S2, no murmur, no peripheral edema and peripheral pulses strong  ABDOMEN: soft, nontender, bowel sounds normal  MS: no gross musculoskeletal defects noted, no edema  SKIN: no suspicious lesions or  rashes  NEURO: Normal strength and tone  PSYCH: mentation appears normal, affect normal/bright     ASSESSMENT/PLAN:       ICD-10-CM    1. Routine general medical examination at a health care facility  Z00.00    2. Essential hypertension  I10 lisinopril (ZESTRIL) 40 MG tablet     metoprolol succinate ER (TOPROL-XL) 200 MG 24 hr tablet   3. Morbid obesity (H)  E66.01    4. Anxiety state  F41.1    5. Need for vaccination  Z23 COVID-19,PF,PFIZER (12+ Yrs GRAY LABEL)           COUNSELING:   Reviewed preventive health counseling, as reflected in patient instructions    Estimated body mass index is 56.54 kg/m  as calculated from the following:    Height as of this encounter: 1.829 m (6').    Weight as of this encounter: 189.1 kg (416 lb 14.4 oz).     Weight management plan: Discussed healthy diet and exercise guidelines    He reports that he has never smoked. He has never used smokeless tobacco.      Tian Perez MD  [facility]

## 2022-02-10 RX ORDER — ALPRAZOLAM 0.5 MG
0.5 TABLET ORAL 3 TIMES DAILY PRN
Qty: 30 TABLET | Refills: 0 | Status: SHIPPED | OUTPATIENT
Start: 2022-02-10 | End: 2022-07-27

## 2022-02-13 ENCOUNTER — MYC MEDICAL ADVICE (OUTPATIENT)
Dept: PEDIATRICS | Facility: CLINIC | Age: 37
End: 2022-02-13
Payer: COMMERCIAL

## 2022-03-08 ENCOUNTER — MYC MEDICAL ADVICE (OUTPATIENT)
Dept: PEDIATRICS | Facility: CLINIC | Age: 37
End: 2022-03-08
Payer: COMMERCIAL

## 2022-03-09 DIAGNOSIS — I10 ESSENTIAL HYPERTENSION: ICD-10-CM

## 2022-03-09 NOTE — TELEPHONE ENCOUNTER
Sent patient that new script was sent on the 9th and the pharmacy is probably looking at the old one.  Viviana Ashby LPN

## 2022-03-10 RX ORDER — LISINOPRIL 40 MG/1
TABLET ORAL
Qty: 30 TABLET | OUTPATIENT
Start: 2022-03-10

## 2022-03-10 NOTE — TELEPHONE ENCOUNTER
Called pharmacy and provided a verbal ok to pharmacy to refill lisinopril and metoprolol. Pharmacy was unable to located prescriptions sent on 02/09/2022.     Zohra Fine MA 6:31 PM 3/9/2022

## 2022-06-02 ENCOUNTER — APPOINTMENT (OUTPATIENT)
Dept: ULTRASOUND IMAGING | Facility: CLINIC | Age: 37
End: 2022-06-02
Attending: EMERGENCY MEDICINE
Payer: COMMERCIAL

## 2022-06-02 ENCOUNTER — HOSPITAL ENCOUNTER (EMERGENCY)
Facility: CLINIC | Age: 37
Discharge: HOME | End: 2022-06-02
Attending: EMERGENCY MEDICINE | Admitting: EMERGENCY MEDICINE
Payer: COMMERCIAL

## 2022-06-02 VITALS
OXYGEN SATURATION: 100 % | SYSTOLIC BLOOD PRESSURE: 154 MMHG | TEMPERATURE: 98.1 F | WEIGHT: 315 LBS | RESPIRATION RATE: 18 BRPM | HEART RATE: 66 BPM | DIASTOLIC BLOOD PRESSURE: 102 MMHG | BODY MASS INDEX: 56.96 KG/M2

## 2022-06-02 DIAGNOSIS — M79.661 RIGHT CALF PAIN: ICD-10-CM

## 2022-06-02 PROCEDURE — 99284 EMERGENCY DEPT VISIT MOD MDM: CPT | Mod: 25

## 2022-06-02 PROCEDURE — 93971 EXTREMITY STUDY: CPT | Mod: RT

## 2022-06-02 NOTE — ED TRIAGE NOTES
Patient states he noticed a hard spot on the inside of his right lower leg. Patient states he noticed it 3 days ago and denies pain to the area.  Patient states he became concerned after looking up his symptoms on google.      Triage Assessment     Row Name 06/02/22 7999       Triage Assessment (Adult)    Airway WDL WDL       Respiratory WDL    Respiratory WDL WDL       Skin Circulation/Temperature WDL    Skin Circulation/Temperature WDL WDL       Cardiac WDL    Cardiac WDL WDL       Peripheral/Neurovascular WDL    Peripheral Neurovascular WDL WDL       Cognitive/Neuro/Behavioral WDL    Cognitive/Neuro/Behavioral WDL WDL

## 2022-06-03 ENCOUNTER — PATIENT OUTREACH (OUTPATIENT)
Dept: CARE COORDINATION | Facility: CLINIC | Age: 37
End: 2022-06-03
Payer: COMMERCIAL

## 2022-06-03 DIAGNOSIS — Z71.89 OTHER SPECIFIED COUNSELING: ICD-10-CM

## 2022-06-03 NOTE — ED PROVIDER NOTES
History     Chief Complaint:    Leg Problem      HPI   Keo Pinto is a 36 year old male who presents with concerns for about a 3-day history of right lower leg swelling and firmness.  This is located in the medial aspect of the distal lower leg.  There is no history of trauma or other complaints.  He was concerned about the possibility of a blood clot.  There is no previous history.    Review of Systems   Musculoskeletal:        Calf swelling   All other systems reviewed and are negative.    Allergies:    No Known Allergies      Medications:      ALPRAZolam (XANAX) 0.5 MG tablet  clotrimazole (LOTRIMIN) 1 % external cream  lisinopril (ZESTRIL) 40 MG tablet  metoprolol succinate ER (TOPROL-XL) 200 MG 24 hr tablet  ondansetron (ZOFRAN ODT) 4 MG ODT tab        Past Medical History:        Past Medical History:   Diagnosis Date     Esophageal reflux      Gallstone pancreatitis 2/28/2019     Hypertension      Migraine headache      Obese      Patient Active Problem List    Diagnosis Date Noted     Morbid obesity (H) 10/27/2015     Priority: Medium     CARDIOVASCULAR SCREENING; LDL GOAL LESS THAN 160 02/10/2010     Priority: Medium     Hypertension 09/03/2008     Priority: Medium     Anxiety state 02/29/2008     Priority: Medium     Problem list name updated by automated process. Provider to review       Esophageal reflux 02/29/2008     Priority: Medium     Insomnia 02/29/2008     Priority: Medium     Problem list name updated by automated process. Provider to review          Past Surgical History:        Past Surgical History:   Procedure Laterality Date     ADENOIDECTOMY       ENDOSCOPIC RETROGRADE CHOLANGIOPANCREATOGRAM N/A 2/28/2019    Procedure: ENDOSCOPIC RETROGRADE CHOLANGIOPANCREATOGRAM with Sphincterotomy and Stone Extraction.;  Surgeon: Amina Hernandez MD;  Location:  OR     ESOPHAGOSCOPY, GASTROSCOPY, DUODENOSCOPY (EGD), COMBINED N/A 2/28/2019    Procedure: COMBINED ENDOSCOPIC ULTRASOUND,  Endoscopic Retrograde Cholangiopancreatoscopy;  Surgeon: Amina Hernandez MD;  Location: [facility] OR     LAPAROSCOPIC CHOLECYSTECTOMY N/A 3/3/2019    Procedure: LAPAROSCOPIC CHOLECYSTECTOMY;  Surgeon: Leland Crowder MD;  Location: [facility] OR     PE TUBES      As a young child       Family History:        Family History   Problem Relation Age of Onset     Neurologic Disorder Mother         Parkinsons, Migraine HA's     Hypertension Father      Alcohol/Drug Father      Heart Disease Maternal Grandfather         MI age 52     Heart Disease Paternal Grandfather         MI age 70's           Physical Exam     Patient Vitals for the past 24 hrs:   BP Temp Temp src Pulse Resp SpO2 Weight   06/02/22 2323 (!) 154/102 -- -- 66 -- 100 % --   06/02/22 1700 (!) 149/102 -- -- -- -- -- --   06/02/22 1655 (!) 203/117 98.1  F (36.7  C) Oral 84 18 99 % (!) 190.5 kg (420 lb)       Physical Exam  General: Patient is alert and cooperative.  Overweight.   HENT:  Normal appearance.   Eyes: EOMI. Normal conjunctiva.  Neck:  Normal range of motion and appearance.   Cardiovascular:  Normal rate.   Pulmonary/Chest:  Effort normal.    Musculoskeletal: Normal range of motion. Small area slight firmness medial r lower leg; no calf asymmetry or swelling or diffuse tenderness.   Neurological: oriented, normal strength, sensation, and coordination.   Skin: Warm and dry. No rash or bruising.   Psychiatric: Normal mood and affect. Normal behavior and judgement.      Emergency Department Course   Imaging:  US Lower Extremity Venous Duplex Right   Final Result   IMPRESSION:   1.  No deep venous thrombosis in the right lower extremity.        Report per radiology    Emergency Department Course:        Assessments:   I obtained history and examined the patient as noted above.    I rechecked the patient and explained findings.   Disposition:  The patient was discharged to home.     Impression & Plan    Medical Decision Making:    Obese 36 year old male  with atraumatic R medial thigh concerns.  Negative US.  No evidence of infection or indication for additional imaging or intervention.  Reassured, advise monitoring, follow up with clinic if worsening or ongoing concerns.     Diagnosis:    ICD-10-CM    1. Right calf pain  M79.661               Kings Lala MD  06/03/22 0955

## 2022-06-03 NOTE — PROGRESS NOTES
Clinic Care Coordination Contact  [facility]/Voicemail       Clinical Data: Care Coordinator Outreach  Outreach attempted x 1.  Left message on patient's voicemail with call back information and requested return call.  Plan: Care Coordinator will try to reach patient again in 1-2 business days.    Silva Hess  Care Transitions Assistant  [facility]

## 2022-06-04 NOTE — PROGRESS NOTES
Clinic Care Coordination Contact  [facility]: Post-Discharge Note  SITUATION                                                      Admission:    Admission Date: 06/02/22   Reason for Admission: Right calf pain  Discharge:   Discharge Date: 06/02/22  Discharge Diagnosis: Right calf pain    BACKGROUND                                                      Per hospital discharge summary and inpatient provider notes:  Keo Pinto is a 36 year old male who presents with concerns for about a 3-day history of right lower leg swelling and firmness.  This is located in the medial aspect of the distal lower leg.  There is no history of trauma or other complaints.  He was concerned about the possibility of a blood clot.  There is no previous history.     Review of Systems   Musculoskeletal:        Calf swelling   All other systems reviewed and are negative.     Allergies:     No Known Allergies        Medications:       ALPRAZolam (XANAX) 0.5 MG tablet  clotrimazole (LOTRIMIN) 1 % external cream  lisinopril (ZESTRIL) 40 MG tablet  metoprolol succinate ER (TOPROL-XL) 200 MG 24 hr tablet  ondansetron (ZOFRAN ODT) 4 MG ODT tab    ASSESSMENT      Enrollment  Primary Care Care Coordination Status: Declined    Discharge Assessment  How are you doing now that you are home?: Pt reports he is well. No questions or concerns about discharge instructions.  How are your symptoms? (Red Flag symptoms escalate to triage hotline per guidelines): Improved  Do you feel your condition is stable enough to be safe at home until your provider visit?: Yes  Does the patient have their discharge instructions? : Yes  Does the patient have questions regarding their discharge instructions? : No  Were you started on any new medications or were there changes to any of your previous medications? : Yes  Does the patient have all of their medications?: Yes  Do you have questions regarding any of your medications? : No  Do you have all of your needed  medical supplies or equipment (DME)?  (i.e. oxygen tank, CPAP, cane, etc.): Yes  Discharge follow-up appointment scheduled within 14 calendar days? : Yes  Discharge Follow Up Appointment Date: Pt is waiting to hear back from clinic  Discharge Follow Up Appointment Scheduled with?: Specialty Care Provider              PLAN                                                       Outpatient Plan:  Schedule an appointment with Tian Perez MD (Internal Medicine - Pediatrics); for re-evaluation of your symptoms, As needed        No future appointments.      For any urgent concerns, please contact our 24 hour nurse triage line: [phone] ([phone])         DAYSI Jay  Connected Care Resource Center  [facility]     *Connected Care Resource Team does NOT follow patient ongoing. Referrals are identified based on internal discharge reports and the outreach is to ensure patient has an understanding of their discharge instructions.

## 2022-07-27 DIAGNOSIS — F41.1 ANXIETY STATE: ICD-10-CM

## 2022-07-27 RX ORDER — ALPRAZOLAM 0.5 MG
TABLET ORAL
Qty: 30 TABLET | Refills: 0 | Status: SHIPPED | OUTPATIENT
Start: 2022-07-27 | End: 2023-06-05

## 2022-07-27 NOTE — TELEPHONE ENCOUNTER
Routing refill request to provider for review/approval because:  Drug not on the [facility] refill protocol     Christian MCLAUGHLIN RN

## 2022-10-20 NOTE — TELEPHONE ENCOUNTER
"Requested Prescriptions   Pending Prescriptions Disp Refills     lisinopril (PRINIVIL/ZESTRIL) 10 MG tablet    Last Written Prescription Date:  6/12/2017  Last Fill Quantity: 90,  # refills: 2   Last office visit: 3/14/2017 with prescribing provider:  Tian Perez     Future Office Visit:     90 tablet 2     Sig: Take 1 tablet (10 mg) by mouth daily    ACE Inhibitors (Including Combos) Protocol Failed    3/20/2018  2:08 PM       Failed - Normal serum creatinine on file in past 12 months    Recent Labs   Lab Test  02/11/17   1827   CR  0.60*            Failed - Normal serum potassium on file in past 12 months    Recent Labs   Lab Test  02/11/17   1827   POTASSIUM  4.0            Passed - Blood pressure under 140/90 in past 12 months    BP Readings from Last 3 Encounters:   10/21/17 134/88   03/14/17 (!) 148/92   02/11/17 (!) 150/102                Passed - Recent (12 mo) or future (30 days) visit within the authorizing provider's specialty    Patient had office visit in the last 12 months or has a visit in the next 30 days with authorizing provider or within the authorizing provider's specialty.  See \"Patient Info\" tab in inbasket, or \"Choose Columns\" in Meds & Orders section of the refill encounter.           Passed - Patient is age 18 or older          "
Left message informing patient to schedule before any further refills.    Maricel Acuña MA    
Please assist patient with scheduling an office visit for an annual physical.      Needs BP follow up.    Thank you    Denise CALHOUN RN, BSN, PHN  [employer] RN    
Routing refill request to provider for review/approval because:  Patient needs to be seen because it has been more than 1 year since last office visit.  2/3 BP not at goal.    Denise CALHOUN RN, BSN, PHN  [place] [facility] RN        
Yes

## 2022-11-28 ENCOUNTER — LAB (OUTPATIENT)
Dept: LAB | Facility: CLINIC | Age: 37
End: 2022-11-28
Attending: FAMILY MEDICINE
Payer: COMMERCIAL

## 2022-11-28 DIAGNOSIS — Z20.822 CLOSE EXPOSURE TO 2019 NOVEL CORONAVIRUS: ICD-10-CM

## 2022-11-28 PROCEDURE — U0005 INFEC AGEN DETEC AMPLI PROBE: HCPCS

## 2022-11-28 PROCEDURE — U0003 INFECTIOUS AGENT DETECTION BY NUCLEIC ACID (DNA OR RNA); SEVERE ACUTE RESPIRATORY SYNDROME CORONAVIRUS 2 (SARS-COV-2) (CORONAVIRUS DISEASE [COVID-19]), AMPLIFIED PROBE TECHNIQUE, MAKING USE OF HIGH THROUGHPUT TECHNOLOGIES AS DESCRIBED BY CMS-2020-01-R: HCPCS

## 2022-11-29 LAB — SARS-COV-2 RNA RESP QL NAA+PROBE: NEGATIVE

## 2022-12-21 ENCOUNTER — MYC MEDICAL ADVICE (OUTPATIENT)
Dept: PEDIATRICS | Facility: CLINIC | Age: 37
End: 2022-12-21

## 2022-12-21 ENCOUNTER — OFFICE VISIT (OUTPATIENT)
Dept: PEDIATRICS | Facility: CLINIC | Age: 37
End: 2022-12-21
Payer: COMMERCIAL

## 2022-12-21 VITALS
RESPIRATION RATE: 20 BRPM | WEIGHT: 315 LBS | DIASTOLIC BLOOD PRESSURE: 84 MMHG | HEART RATE: 89 BPM | TEMPERATURE: 98 F | SYSTOLIC BLOOD PRESSURE: 126 MMHG | BODY MASS INDEX: 60.41 KG/M2 | OXYGEN SATURATION: 98 %

## 2022-12-21 DIAGNOSIS — M54.50 ACUTE LEFT-SIDED LOW BACK PAIN WITHOUT SCIATICA: Primary | ICD-10-CM

## 2022-12-21 DIAGNOSIS — E66.01 MORBID OBESITY (H): ICD-10-CM

## 2022-12-21 DIAGNOSIS — Z23 NEED FOR VACCINATION: ICD-10-CM

## 2022-12-21 PROCEDURE — 90471 IMMUNIZATION ADMIN: CPT | Performed by: INTERNAL MEDICINE

## 2022-12-21 PROCEDURE — 99214 OFFICE O/P EST MOD 30 MIN: CPT | Mod: 25 | Performed by: INTERNAL MEDICINE

## 2022-12-21 PROCEDURE — 0124A COVID-19 VACCINE BIVALENT BOOSTER 12+ (PFIZER): CPT | Performed by: INTERNAL MEDICINE

## 2022-12-21 PROCEDURE — 91312 COVID-19 VACCINE BIVALENT BOOSTER 12+ (PFIZER): CPT | Performed by: INTERNAL MEDICINE

## 2022-12-21 PROCEDURE — 90686 IIV4 VACC NO PRSV 0.5 ML IM: CPT | Performed by: INTERNAL MEDICINE

## 2022-12-21 RX ORDER — CYCLOBENZAPRINE HCL 10 MG
10 TABLET ORAL 3 TIMES DAILY PRN
Qty: 30 TABLET | Refills: 1 | Status: SHIPPED | OUTPATIENT
Start: 2022-12-21 | End: 2024-04-03

## 2022-12-21 ASSESSMENT — ENCOUNTER SYMPTOMS: BACK PAIN: 1

## 2022-12-21 ASSESSMENT — PAIN SCALES - GENERAL: PAINLEVEL: MODERATE PAIN (4)

## 2022-12-21 NOTE — TELEPHONE ENCOUNTER
"Called pt at [phone] to offer appointment today for pt to be seen related to back pain.     He reports the back pain was worse over the weekend, it tends to be aggravated in the evening and weekends. He states the process of standing and walking is painful and feels as if \"someone is poking.\"     Pt will accept and agrees with appointment at 3:10pm today.     Meryl Bob RN  PAL (Patient Advocate Liason)  [facility]    "

## 2022-12-21 NOTE — PROGRESS NOTES
Assessment & Plan       ICD-10-CM    1. Acute left-sided low back pain without sciatica  M54.50 Physical Therapy Referral     cyclobenzaprine (FLEXERIL) 10 MG tablet      2. Morbid obesity (H)  E66.01       3. Need for vaccination  Z23 INFLUENZA VACCINE IM > 6 MONTHS VALENT IIV4 (AFLURIA/FLUZONE)     COVID-19,PF,PFIZER BOOSTER BIVALENT (12+YRS)        Sx clinically are myofascial in nature. Nothing on exam to suggest a spine or disc etiology.  Reviewed management options.  Home stretching, heat, gentle exercise.   Rx for cyclobenzaprine. Take at least at nighttime.  [facility] referral signed.  Once his back is improving, efforts toward weight loss should help to prevent future exacerbations.      Tian Perez MD  [facility] TANIA Yao is a 37 year old, presenting for the following health issues:  Back Pain      Back Pain     History of Present Illness       Back Pain:  He presents for follow up of back pain. Patient's back pain is a new problem.    Original cause of back pain: not sure  First noticed back pain: in the last week  Patient feels back pain: 2 to 4 times per dayLocation of back pain:  Left lower back and left middle of back  Description of back pain: sharp  Back pain spreads: left buttocks and left knee    Since patient first noticed back pain, pain is: always present, but gets better and worse  Does back pain interfere with his job:  Not applicable  On a scale of 1-10 (10 being the worst), patient describes pain as:  7  What makes back pain worse: bending and certain positions  Acupuncture: not tried  Acetaminophen: not helpful  Activity or exercise: helpful  Chiropractor:  Not tried  Cold: not tried  Heat: not tried  Massage: not tried  Muscle relaxants: not tried  NSAIDS: not tried  Opioids: not tried  Physical Therapy: not tried  Rest: helpful  Steroid Injection: not tried  Stretching: not helpful  Surgery: not tried  TENS unit: not tried  Topical pain relievers: not  tried  Other healthcare providers patient is seeing for back pain: None    He eats 0-1 servings of fruits and vegetables daily.He consumes 0 sweetened beverage(s) daily.He exercises with enough effort to increase his heart rate 10 to 19 minutes per day.  He exercises with enough effort to increase his heart rate 3 or less days per week.   He is taking medications regularly.     Sx started 4 days ago  No specific inciting event  Walking 3 days ago, started to improve  Was 9/10 at onset    Pan improves throught the day, worse when he wakes in th eam    Pain radiating down the left leg, buttock to the posterior knee    No associated numbness, weakness or tingling.    He is obese, which may be contributing to his symptoms.  Wt Readings from Last 4 Encounters:   12/21/22 (!) 202 kg (445 lb 6.4 oz)   06/02/22 (!) 190.5 kg (420 lb)   02/09/22 (!) 189.1 kg (416 lb 14.4 oz)   01/13/22 (!) 189.6 kg (418 lb 1.6 oz)       Review of Systems   Musculoskeletal: Positive for back pain.            Objective    /84   Pulse 89   Temp 98  F (36.7  C) (Temporal)   Resp 20   Wt (!) 202 kg (445 lb 6.4 oz)   SpO2 98%   BMI 60.41 kg/m    Body mass index is 60.41 kg/m .  Physical Exam   GEN: No distress  SKIN: No rashes  LUNGS: Clear to auscultation bilaterally. No rhonchi, rales, wheezes or retractions.  CV: RRR. No M.  ABD: BS+. S, ND.  BACK: no point tenderness w/ palpation along the lower thoracic and entire lumbar spine. Discomfort w/ palpation over the left mid to lower paralumbar region toward the left hip.  MS: No LE edema. SLR w/ increased pain on the left.  NEURO: Strength and sensation intact BLE. Gait is somewhat slow d/t pain.

## 2023-03-31 DIAGNOSIS — I10 ESSENTIAL HYPERTENSION: ICD-10-CM

## 2023-03-31 RX ORDER — LISINOPRIL 40 MG/1
40 TABLET ORAL DAILY
Qty: 90 TABLET | Refills: 0 | Status: SHIPPED | OUTPATIENT
Start: 2023-03-31 | End: 2023-06-05

## 2023-03-31 RX ORDER — METOPROLOL SUCCINATE 200 MG/1
TABLET, EXTENDED RELEASE ORAL
Qty: 90 TABLET | Refills: 0 | Status: SHIPPED | OUTPATIENT
Start: 2023-03-31 | End: 2023-06-05

## 2023-03-31 NOTE — TELEPHONE ENCOUNTER
Medication is being filled for 1 time refill only due to:  Patient needs to be seen because it has been more than one year since last visit.   My chart sent to schedule annual exam  Estephanie Peña RN

## 2023-04-22 ENCOUNTER — HEALTH MAINTENANCE LETTER (OUTPATIENT)
Age: 38
End: 2023-04-22

## 2023-05-18 ENCOUNTER — APPOINTMENT (OUTPATIENT)
Dept: GENERAL RADIOLOGY | Facility: CLINIC | Age: 38
End: 2023-05-18
Attending: STUDENT IN AN ORGANIZED HEALTH CARE EDUCATION/TRAINING PROGRAM
Payer: COMMERCIAL

## 2023-05-18 ENCOUNTER — HOSPITAL ENCOUNTER (EMERGENCY)
Facility: CLINIC | Age: 38
Discharge: HOME | End: 2023-05-18
Attending: EMERGENCY MEDICINE | Admitting: EMERGENCY MEDICINE
Payer: COMMERCIAL

## 2023-05-18 VITALS
DIASTOLIC BLOOD PRESSURE: 85 MMHG | TEMPERATURE: 98.1 F | RESPIRATION RATE: 11 BRPM | SYSTOLIC BLOOD PRESSURE: 146 MMHG | HEART RATE: 75 BPM | OXYGEN SATURATION: 94 %

## 2023-05-18 DIAGNOSIS — I10 BENIGN ESSENTIAL HYPERTENSION: ICD-10-CM

## 2023-05-18 DIAGNOSIS — R07.9 CHEST PAIN, UNSPECIFIED TYPE: ICD-10-CM

## 2023-05-18 LAB
ANION GAP SERPL CALCULATED.3IONS-SCNC: 11 MMOL/L (ref 7–15)
BASOPHILS # BLD AUTO: 0 10E3/UL (ref 0–0.2)
BASOPHILS NFR BLD AUTO: 0 %
BUN SERPL-MCNC: 6.9 MG/DL (ref 6–20)
CALCIUM SERPL-MCNC: 8.7 MG/DL (ref 8.6–10)
CHLORIDE SERPL-SCNC: 101 MMOL/L (ref 98–107)
CREAT SERPL-MCNC: 0.9 MG/DL (ref 0.67–1.17)
D DIMER PPP FEU-MCNC: <0.27 UG/ML FEU (ref 0–0.5)
DEPRECATED HCO3 PLAS-SCNC: 25 MMOL/L (ref 22–29)
EOSINOPHIL # BLD AUTO: 0.1 10E3/UL (ref 0–0.7)
EOSINOPHIL NFR BLD AUTO: 1 %
ERYTHROCYTE [DISTWIDTH] IN BLOOD BY AUTOMATED COUNT: 12.6 % (ref 10–15)
FLUAV RNA SPEC QL NAA+PROBE: NEGATIVE
FLUBV RNA RESP QL NAA+PROBE: NEGATIVE
GFR SERPL CREATININE-BSD FRML MDRD: >90 ML/MIN/1.73M2
GLUCOSE SERPL-MCNC: 97 MG/DL (ref 70–99)
HCT VFR BLD AUTO: 44.3 % (ref 40–53)
HGB BLD-MCNC: 15.2 G/DL (ref 13.3–17.7)
HOLD SPECIMEN: NORMAL
HOLD SPECIMEN: NORMAL
IMM GRANULOCYTES # BLD: 0 10E3/UL
IMM GRANULOCYTES NFR BLD: 0 %
LYMPHOCYTES # BLD AUTO: 2.2 10E3/UL (ref 0.8–5.3)
LYMPHOCYTES NFR BLD AUTO: 32 %
MCH RBC QN AUTO: 30.2 PG (ref 26.5–33)
MCHC RBC AUTO-ENTMCNC: 34.3 G/DL (ref 31.5–36.5)
MCV RBC AUTO: 88 FL (ref 78–100)
MONOCYTES # BLD AUTO: 0.4 10E3/UL (ref 0–1.3)
MONOCYTES NFR BLD AUTO: 6 %
NEUTROPHILS # BLD AUTO: 4 10E3/UL (ref 1.6–8.3)
NEUTROPHILS NFR BLD AUTO: 61 %
NRBC # BLD AUTO: 0 10E3/UL
NRBC BLD AUTO-RTO: 0 /100
PLATELET # BLD AUTO: 265 10E3/UL (ref 150–450)
POTASSIUM SERPL-SCNC: 3.8 MMOL/L (ref 3.4–5.3)
RBC # BLD AUTO: 5.03 10E6/UL (ref 4.4–5.9)
RSV RNA SPEC NAA+PROBE: NEGATIVE
SARS-COV-2 RNA RESP QL NAA+PROBE: NEGATIVE
SODIUM SERPL-SCNC: 137 MMOL/L (ref 136–145)
TROPONIN T SERPL HS-MCNC: <6 NG/L
TROPONIN T SERPL HS-MCNC: <6 NG/L
WBC # BLD AUTO: 6.8 10E3/UL (ref 4–11)

## 2023-05-18 PROCEDURE — 84484 ASSAY OF TROPONIN QUANT: CPT | Mod: 91 | Performed by: EMERGENCY MEDICINE

## 2023-05-18 PROCEDURE — 99285 EMERGENCY DEPT VISIT HI MDM: CPT | Mod: 25

## 2023-05-18 PROCEDURE — 71046 X-RAY EXAM CHEST 2 VIEWS: CPT

## 2023-05-18 PROCEDURE — 85014 HEMATOCRIT: CPT | Performed by: EMERGENCY MEDICINE

## 2023-05-18 PROCEDURE — C9803 HOPD COVID-19 SPEC COLLECT: HCPCS

## 2023-05-18 PROCEDURE — 36415 COLL VENOUS BLD VENIPUNCTURE: CPT | Performed by: STUDENT IN AN ORGANIZED HEALTH CARE EDUCATION/TRAINING PROGRAM

## 2023-05-18 PROCEDURE — 36415 COLL VENOUS BLD VENIPUNCTURE: CPT | Performed by: EMERGENCY MEDICINE

## 2023-05-18 PROCEDURE — 87637 SARSCOV2&INF A&B&RSV AMP PRB: CPT | Performed by: EMERGENCY MEDICINE

## 2023-05-18 PROCEDURE — 82310 ASSAY OF CALCIUM: CPT | Performed by: EMERGENCY MEDICINE

## 2023-05-18 PROCEDURE — 84484 ASSAY OF TROPONIN QUANT: CPT | Performed by: EMERGENCY MEDICINE

## 2023-05-18 PROCEDURE — 84484 ASSAY OF TROPONIN QUANT: CPT | Performed by: STUDENT IN AN ORGANIZED HEALTH CARE EDUCATION/TRAINING PROGRAM

## 2023-05-18 PROCEDURE — 93005 ELECTROCARDIOGRAM TRACING: CPT

## 2023-05-18 PROCEDURE — 85379 FIBRIN DEGRADATION QUANT: CPT | Performed by: EMERGENCY MEDICINE

## 2023-05-18 ASSESSMENT — ENCOUNTER SYMPTOMS
HEADACHES: 0
NAUSEA: 0
FEVER: 0
NUMBNESS: 0
FLANK PAIN: 0
CHILLS: 0
CHEST TIGHTNESS: 1
COUGH: 0
SHORTNESS OF BREATH: 1
NECK PAIN: 0
VOMITING: 0
ABDOMINAL PAIN: 0
PALPITATIONS: 0
DIZZINESS: 0
BACK PAIN: 0

## 2023-05-18 ASSESSMENT — ACTIVITIES OF DAILY LIVING (ADL)
ADLS_ACUITY_SCORE: 35
ADLS_ACUITY_SCORE: 35

## 2023-05-18 NOTE — ED TRIAGE NOTES
L wrist pain x 2 weeks, chest pain and tightness x 1 week.  Pt has hx of anxiety and has taken prn meds thinking symptoms were related to anxiety but now pt SOB and fatigued and chest pain is more persistent.  Reports L arm pain often radiates up L side of neck.  Hx of HTN, on bp meds, pt morbidly obese.

## 2023-05-18 NOTE — ED PROVIDER NOTES
History     Chief Complaint:  Chest tightness and shortness of breath       HPI   Keo Pinto is a 37 year old male with hypertension who presents with chest tightness and shortness of breath.  The patient reports that 2 weeks ago he started with some intermittent left wrist pain on the palmar surface of his wrist which comes and goes and resolves when he massages his wrist.  1.5 weeks ago he started with some intermittent chest tightness at rest which does not wake him up at night.  There is no exertional chest pain or shortness of breath.  He thought it was his anxiety so he started taking his Xanax again with some improvement.  He reports that over the past 2 nights he has woken up feeling short of breath but does not have shortness of breath while he is awake or with exertion.  He also endorses some chest tightness today also which is low level.  He denies back pain or neck pain.  He denies headache.  He denies leg pain or swelling, fevers or chills, cough or cold symptoms, abdominal pain, vomiting or diarrhea.      Independent Historian:   None - Patient Only    Review of External Notes: None    ROS:  Review of Systems   Constitutional: Negative for chills and fever.   Respiratory: Positive for chest tightness and shortness of breath. Negative for cough.    Cardiovascular: Positive for chest pain. Negative for palpitations and leg swelling.   Gastrointestinal: Negative for abdominal pain, nausea and vomiting.   Genitourinary: Negative for flank pain.   Musculoskeletal: Negative for back pain and neck pain.   Neurological: Negative for dizziness, numbness and headaches.   All other systems reviewed and are negative.      Allergies:  No Known Allergies     Medications:    ALPRAZolam (XANAX) 0.5 MG tablet  clotrimazole (LOTRIMIN) 1 % external cream  cyclobenzaprine (FLEXERIL) 10 MG tablet  lisinopril (ZESTRIL) 40 MG tablet  metoprolol succinate ER (TOPROL XL) 200 MG 24 hr tablet        Past Medical History:     Past Medical History:   Diagnosis Date     Esophageal reflux      Gallstone pancreatitis 2/28/2019     Hypertension      Migraine headache      Obese        Past Surgical History:    Past Surgical History:   Procedure Laterality Date     ADENOIDECTOMY       ENDOSCOPIC RETROGRADE CHOLANGIOPANCREATOGRAM N/A 2/28/2019    Procedure: ENDOSCOPIC RETROGRADE CHOLANGIOPANCREATOGRAM with Sphincterotomy and Stone Extraction.;  Surgeon: Amina Hernandez MD;  Location: [facility] OR     ESOPHAGOSCOPY, GASTROSCOPY, DUODENOSCOPY (EGD), COMBINED N/A 2/28/2019    Procedure: COMBINED ENDOSCOPIC ULTRASOUND, Endoscopic Retrograde Cholangiopancreatoscopy;  Surgeon: Amina Hernandez MD;  Location: [facility] OR     LAPAROSCOPIC CHOLECYSTECTOMY N/A 3/3/2019    Procedure: LAPAROSCOPIC CHOLECYSTECTOMY;  Surgeon: Leland Crowder MD;  Location: [facility] OR     PE TUBES      As a young child        Family History:    family history includes Alcohol/Drug in his father; Heart Disease in his maternal grandfather and paternal grandfather; Hypertension in his father; Neurologic Disorder in his mother.    Social History:   reports that he has never smoked. He has never used smokeless tobacco. He reports that he does not currently use alcohol. He reports that he does not use drugs.  PCP: Tian Perez     Physical Exam     Patient Vitals for the past 24 hrs:   BP Temp Temp src Pulse Resp SpO2   05/18/23 1228 (!) 176/110 -- -- -- 18 98 %   05/18/23 1203 (!) 203/119 98.1  F (36.7  C) Temporal 93 19 97 %        Physical Exam  Nursing note and vitals reviewed.  Constitutional:  Appears well-developed and well-nourished.   HENT:   Head:    Atraumatic.   Mouth/Throat:   Oropharynx is clear and moist. No oropharyngeal exudate.   Eyes:    Pupils are equal, round, and reactive to light.   Neck:    Normal range of motion. Neck supple.      No tracheal deviation present. No thyromegaly present.   Cardiovascular:  Normal rate, regular rhythm, no murmur.  Good  radial pulses bilaterally.  Pulmonary/Chest: Breath sounds are clear and equal without wheezes or crackles.  Abdominal:   Soft. Bowel sounds are normal. Exhibits no distension and      no mass. There is no tenderness.      There is no rebound and no guarding.   Musculoskeletal:  Exhibits no edema. Arms and legs appear normal.  No swelling or tenderness to the arms or legs.  Lymphadenopathy:  No cervical adenopathy.   Neurological:   Alert and oriented to person, place, and time.   Skin:    Skin is warm and dry. No rash noted. No pallor.       Emergency Department Course   ECG  ECG taken at 5/18/23 12:43, ECG read at 1248pm  Normal Sinus Rhythm   Ventricular Rate 87 bpm. PA interval 174 ms. QRS duration 94 ms. QT/QTc 376/452 ms. P-R-T axes 37 -17 35.           Imaging:  Chest XR,  PA & LAT   Final Result   IMPRESSION: PA and lateral views of the chest were obtained.   Cardiomediastinal silhouette is within normal limits. No suspicious   focal pulmonary opacities. No significant pleural effusion or   pneumothorax.        TORRIE MOTA MD            SYSTEM ID:  [id number]      Exercise Stress Echocardiogram    (Results Pending)      Report per radiology    Laboratory:  Labs Ordered and Resulted from Time of ED Arrival to Time of ED Departure   BASIC METABOLIC PANEL - Normal       Result Value    Sodium 137      Potassium 3.8      Chloride 101      Carbon Dioxide (CO2) 25      Anion Gap 11      Urea Nitrogen 6.9      Creatinine 0.90      Calcium 8.7      Glucose 97      GFR Estimate >90     TROPONIN T, HIGH SENSITIVITY - Normal    Troponin T, High Sensitivity <6     INFLUENZA A/B, RSV, & SARS-COV2 PCR - Normal    Influenza A PCR Negative      Influenza B PCR Negative      RSV PCR Negative      SARS CoV2 PCR Negative     D DIMER QUANTITATIVE - Normal    D-Dimer Quantitative <0.27     TROPONIN T, HIGH SENSITIVITY - Normal    Troponin T, High Sensitivity <6     CBC WITH PLATELETS AND DIFFERENTIAL    WBC Count 6.8       RBC Count 5.03      Hemoglobin 15.2      Hematocrit 44.3      MCV 88      MCH 30.2      MCHC 34.3      RDW 12.6      Platelet Count 265      % Neutrophils 61      % Lymphocytes 32      % Monocytes 6      % Eosinophils 1      % Basophils 0      % Immature Granulocytes 0      NRBCs per 100 WBC 0      Absolute Neutrophils 4.0      Absolute Lymphocytes 2.2      Absolute Monocytes 0.4      Absolute Eosinophils 0.1      Absolute Basophils 0.0      Absolute Immature Granulocytes 0.0      Absolute NRBCs 0.0            Emergency Department Course & Assessments:       Interventions:  Medications - No data to display       Independent Interpretation (X-rays, CTs, rhythm strip):  I reviewed his chest x-ray findings and do not see any widened mediastinum, infiltrate, pneumothorax or acute abnormality.    Consultations/Discussion of Management or Tests:  ED Course as of 05/18/23 1903   Thu May 18, 2023   1300 Dr. March initially assessed the patient and obtained the above history and physical exam.     1410 I reassessed the patient and updated him on and explained findings so far.  Mary Kate Layton PA-C       Social Determinants of Health affecting care:   None    Disposition:  The patient was discharged to home.     Impression & Plan      Medical Decision Making:  I found this patient to have chest tightness and shortness of breath of unclear etiology.  I considered the possibility of anxiety since the patient also thinks that his symptoms may be due to anxiety.  There was no sign of acute myocardial infarction here since his troponin values x2 over 2 hours apart were both negative and ECG does not show any cardiac Arrhythmia or sign of myocardial infarction.  His blood pressure was initially elevated but improved during the ED course.  His symptoms do not sound concerning for aortic dissection.  His D-dimer is normal and I did not feel there was concern for pulmonary embolism.  There was no sign of pneumothorax, pneumonia or  effusion.  I felt he can be safely discharged home since he had an extensive unremarkable work-up here, he has not tachycardic or hypoxic and his blood pressure improved spontaneously.  He was told to follow-up in clinic to have close recheck in clinic and recheck his blood pressure.  He was told to continue taking his antihypertensive medication.  He requested a refill of Xanax but I felt this was more appropriate to be prescribed by his primary care physician.  He was told signs and symptoms to return for including any worsening of symptoms.  I ordered an outpatient stress echo due to his cardiac risk factors as well as cardiology follow-up.    Diagnosis:    ICD-10-CM    1. Chest pain, unspecified type  R07.9 Exercise Stress Echocardiogram     Follow-Up with Cardiology      2. Benign essential hypertension  I10            Discharge Medications:  New Prescriptions    No medications on file        5/18/2023   Kailey March MD [place], Cheri Lee, MD  05/18/23 1908

## 2023-05-19 LAB
ATRIAL RATE - MUSE: 87 BPM
DIASTOLIC BLOOD PRESSURE - MUSE: NORMAL MMHG
INTERPRETATION ECG - MUSE: NORMAL
P AXIS - MUSE: 37 DEGREES
PR INTERVAL - MUSE: 174 MS
QRS DURATION - MUSE: 94 MS
QT - MUSE: 376 MS
QTC - MUSE: 452 MS
R AXIS - MUSE: -17 DEGREES
SYSTOLIC BLOOD PRESSURE - MUSE: NORMAL MMHG
T AXIS - MUSE: 35 DEGREES
VENTRICULAR RATE- MUSE: 87 BPM

## 2023-05-23 ENCOUNTER — MYC MEDICAL ADVICE (OUTPATIENT)
Dept: PEDIATRICS | Facility: CLINIC | Age: 38
End: 2023-05-23

## 2023-05-23 ENCOUNTER — HOSPITAL ENCOUNTER (OUTPATIENT)
Dept: CARDIOLOGY | Facility: CLINIC | Age: 38
Discharge: HOME | End: 2023-05-23
Attending: STUDENT IN AN ORGANIZED HEALTH CARE EDUCATION/TRAINING PROGRAM | Admitting: STUDENT IN AN ORGANIZED HEALTH CARE EDUCATION/TRAINING PROGRAM
Payer: COMMERCIAL

## 2023-05-23 DIAGNOSIS — R07.9 CHEST PAIN, UNSPECIFIED TYPE: ICD-10-CM

## 2023-05-23 PROCEDURE — 93350 STRESS TTE ONLY: CPT | Mod: 26 | Performed by: INTERNAL MEDICINE

## 2023-05-23 PROCEDURE — 93321 DOPPLER ECHO F-UP/LMTD STD: CPT | Mod: TC

## 2023-05-23 PROCEDURE — 999N000208 ECHO STRESS ECHOCARDIOGRAM

## 2023-05-23 PROCEDURE — 93325 DOPPLER ECHO COLOR FLOW MAPG: CPT | Mod: 26 | Performed by: INTERNAL MEDICINE

## 2023-05-23 PROCEDURE — 93321 DOPPLER ECHO F-UP/LMTD STD: CPT | Mod: 26 | Performed by: INTERNAL MEDICINE

## 2023-05-23 PROCEDURE — 93016 CV STRESS TEST SUPVJ ONLY: CPT | Performed by: INTERNAL MEDICINE

## 2023-05-23 PROCEDURE — 255N000002 HC RX 255 OP 636: Performed by: STUDENT IN AN ORGANIZED HEALTH CARE EDUCATION/TRAINING PROGRAM

## 2023-05-23 PROCEDURE — 93018 CV STRESS TEST I&R ONLY: CPT | Performed by: INTERNAL MEDICINE

## 2023-05-23 RX ADMIN — HUMAN ALBUMIN MICROSPHERES AND PERFLUTREN 3 ML: 10; .22 INJECTION, SOLUTION INTRAVENOUS at 13:30

## 2023-05-23 NOTE — TELEPHONE ENCOUNTER
Please review pt's MC message. Pt haven't est care with a cardiologist yet. Please review result & advise, if appropriate. Thanks.    JAIME Mock  Patient Advocate Liason (PAL)  "[employer]"[facility]  Ph. [phone] / Fax. [phone]

## 2023-05-24 NOTE — TELEPHONE ENCOUNTER
Called pt back & went over the stress test result and 's recommendation. Pt would like to schedule a f/u visit with  in 1-2 weeks(this Friday won't work for him).    Scheduled an OV with  on 6/5/23 at 2:40 pm(pt can't come in at 1:10 pm). Pt will reach us sooner with any concerns or questions.    JAIME Mock  Patient Advocate Liason (PAL)  [facility]  Ph. [phone] / Fax. [phone]

## 2023-06-05 ENCOUNTER — OFFICE VISIT (OUTPATIENT)
Dept: PEDIATRICS | Facility: CLINIC | Age: 38
End: 2023-06-05
Payer: COMMERCIAL

## 2023-06-05 VITALS
DIASTOLIC BLOOD PRESSURE: 82 MMHG | BODY MASS INDEX: 61.84 KG/M2 | SYSTOLIC BLOOD PRESSURE: 136 MMHG | HEART RATE: 92 BPM | OXYGEN SATURATION: 98 % | WEIGHT: 315 LBS | TEMPERATURE: 97.1 F | RESPIRATION RATE: 14 BRPM

## 2023-06-05 DIAGNOSIS — E66.813 CLASS 3 OBESITY: ICD-10-CM

## 2023-06-05 DIAGNOSIS — I10 ESSENTIAL HYPERTENSION: ICD-10-CM

## 2023-06-05 DIAGNOSIS — F41.1 ANXIETY STATE: Primary | ICD-10-CM

## 2023-06-05 PROCEDURE — 99214 OFFICE O/P EST MOD 30 MIN: CPT | Performed by: INTERNAL MEDICINE

## 2023-06-05 RX ORDER — LISINOPRIL 40 MG/1
40 TABLET ORAL DAILY
Qty: 90 TABLET | Refills: 3 | Status: SHIPPED | OUTPATIENT
Start: 2023-06-05 | End: 2024-04-26

## 2023-06-05 RX ORDER — ESCITALOPRAM OXALATE 10 MG/1
10 TABLET ORAL DAILY
Qty: 30 TABLET | Refills: 1 | Status: SHIPPED | OUTPATIENT
Start: 2023-06-05 | End: 2024-04-03

## 2023-06-05 RX ORDER — ALPRAZOLAM 0.5 MG
0.5 TABLET ORAL 3 TIMES DAILY PRN
Qty: 30 TABLET | Refills: 1 | Status: SHIPPED | OUTPATIENT
Start: 2023-06-05 | End: 2024-04-26

## 2023-06-05 RX ORDER — METOPROLOL SUCCINATE 200 MG/1
TABLET, EXTENDED RELEASE ORAL
Qty: 90 TABLET | Refills: 3 | Status: SHIPPED | OUTPATIENT
Start: 2023-06-05 | End: 2024-04-26

## 2023-06-05 NOTE — PROGRESS NOTES
Assessment & Plan       ICD-10-CM    1. Anxiety state  F41.1 ALPRAZolam (XANAX) 0.5 MG tablet     escitalopram (LEXAPRO) 10 MG tablet      2. Essential hypertension  I10 lisinopril (ZESTRIL) 40 MG tablet     metoprolol succinate ER (TOPROL XL) 200 MG 24 hr tablet      3. Class 3 obesity (H)  E66.01         Anxiety. Likely contributing to / causing his chest pain.  Start escitalopram 10 mg every day  Continue with alprazolam prn    If CP sx do not improve, plan to add PPI    HTN. Initial SBP was high, improved on recheck. For now, continue w/ current medications.    Obesity. Encouraged exercise and dietary changes.     Tian Perez MD  [facility] TANIA Yao is a 37 year old, presenting for the following health issues:  ER F/U        6/5/2023     2:48 PM   Additional Questions   Roomed by Risa VALENZUELA         6/5/2023     2:48 PM   Patient Reported Additional Medications   Patient reports taking the following new medications None     HPI     ED/UC Followup:    Facility:  [facility]  Date of visit: 5/18/2023   Reason for visit: Chest tightness and shortness of breath  Current Status: pt states that the tightness in chest is gradually getting better.     He was experiencing chest pain for approximately 2 weeks prior to his ED visit.   In the ED, EKG was normal.  Troponin normal x 2.  D-dimer was negative.    Stress testing was ordered and completed.  Interpretation Summary     Negative exercise stress echocardiogram.     1. Patient exercised for 5 minutes on a Feng Protocol (7.1 METs, below  average exertional tolerance for age and gender). Target HR achieved.  2. No ECG or echocardiographic evidence of exercise-induced ischemia.  3. Limited resting echo shows normal LVEF with no significant valvular  stenosis or regurgitation.     No prior study available for comparison.    Not currently taking medications to treat GERD.  No daily meds for anxiety  either.  Has alprazolam, tried taking BID w/ slight improvement with his symptoms.     We discussed options for med changes.    HTN. Initial SBP was high, improved on recheck.    Class 3 Obesity. BMI is between >40. He has a dx of HTN. Discussed that improvement of his weight would likely improve his medical condition.         Objective    /82   Pulse 92   Temp 97.1  F (36.2  C) (Temporal)   Resp 14   Wt (!) 206.8 kg (456 lb)   SpO2 98%   BMI 61.84 kg/m    Body mass index is 61.84 kg/m .  Physical Exam   GEN: no distress  SKIN: no rashes  HEENT: PERRL. EOMI. TM's clear bilaterally. Nasal mucosa normal. OP moist without lesions.  NECK: Supple. No LAD or TM.  LUNGS: Clear to auscultation bilaterally. No rhonchi, rales, wheezes or retractions.  CV: Regular rate and rhythm.  No murmurs, rubs or gallops. Pulses 2+ radial.  EXTR: no edema  PSYCH: somewhat anxious affect. Well groomed. Good eye contact.

## 2023-08-08 ENCOUNTER — MEDICAL CORRESPONDENCE (OUTPATIENT)
Dept: HEALTH INFORMATION MANAGEMENT | Facility: CLINIC | Age: 38
End: 2023-08-08
Payer: COMMERCIAL

## 2023-08-09 ENCOUNTER — APPOINTMENT (OUTPATIENT)
Dept: MRI IMAGING | Facility: CLINIC | Age: 38
End: 2023-08-09
Attending: EMERGENCY MEDICINE
Payer: COMMERCIAL

## 2023-08-09 ENCOUNTER — HOSPITAL ENCOUNTER (EMERGENCY)
Facility: CLINIC | Age: 38
Discharge: HOME | End: 2023-08-09
Attending: EMERGENCY MEDICINE | Admitting: EMERGENCY MEDICINE
Payer: COMMERCIAL

## 2023-08-09 VITALS
HEIGHT: 71 IN | WEIGHT: 315 LBS | HEART RATE: 91 BPM | DIASTOLIC BLOOD PRESSURE: 111 MMHG | TEMPERATURE: 97.3 F | OXYGEN SATURATION: 97 % | RESPIRATION RATE: 18 BRPM | BODY MASS INDEX: 44.1 KG/M2 | SYSTOLIC BLOOD PRESSURE: 166 MMHG

## 2023-08-09 DIAGNOSIS — G51.0 LEFT-SIDED BELL'S PALSY: ICD-10-CM

## 2023-08-09 LAB
ANION GAP SERPL CALCULATED.3IONS-SCNC: 11 MMOL/L (ref 7–15)
BASOPHILS # BLD AUTO: 0 10E3/UL (ref 0–0.2)
BASOPHILS NFR BLD AUTO: 0 %
BUN SERPL-MCNC: 7.6 MG/DL (ref 6–20)
CALCIUM SERPL-MCNC: 9.5 MG/DL (ref 8.6–10)
CHLORIDE SERPL-SCNC: 101 MMOL/L (ref 98–107)
CREAT SERPL-MCNC: 0.9 MG/DL (ref 0.67–1.17)
DEPRECATED HCO3 PLAS-SCNC: 24 MMOL/L (ref 22–29)
EOSINOPHIL # BLD AUTO: 0.1 10E3/UL (ref 0–0.7)
EOSINOPHIL NFR BLD AUTO: 1 %
ERYTHROCYTE [DISTWIDTH] IN BLOOD BY AUTOMATED COUNT: 12.3 % (ref 10–15)
GFR SERPL CREATININE-BSD FRML MDRD: >90 ML/MIN/1.73M2
GLUCOSE SERPL-MCNC: 95 MG/DL (ref 70–99)
HCT VFR BLD AUTO: 46.1 % (ref 40–53)
HGB BLD-MCNC: 16.1 G/DL (ref 13.3–17.7)
HOLD SPECIMEN: NORMAL
HOLD SPECIMEN: NORMAL
IMM GRANULOCYTES # BLD: 0 10E3/UL
IMM GRANULOCYTES NFR BLD: 0 %
LYMPHOCYTES # BLD AUTO: 1.7 10E3/UL (ref 0.8–5.3)
LYMPHOCYTES NFR BLD AUTO: 21 %
MCH RBC QN AUTO: 30.3 PG (ref 26.5–33)
MCHC RBC AUTO-ENTMCNC: 34.9 G/DL (ref 31.5–36.5)
MCV RBC AUTO: 87 FL (ref 78–100)
MONOCYTES # BLD AUTO: 0.4 10E3/UL (ref 0–1.3)
MONOCYTES NFR BLD AUTO: 5 %
NEUTROPHILS # BLD AUTO: 5.9 10E3/UL (ref 1.6–8.3)
NEUTROPHILS NFR BLD AUTO: 73 %
NRBC # BLD AUTO: 0 10E3/UL
NRBC BLD AUTO-RTO: 0 /100
PLATELET # BLD AUTO: 264 10E3/UL (ref 150–450)
POTASSIUM SERPL-SCNC: 4 MMOL/L (ref 3.4–5.3)
RBC # BLD AUTO: 5.31 10E6/UL (ref 4.4–5.9)
SODIUM SERPL-SCNC: 136 MMOL/L (ref 136–145)
WBC # BLD AUTO: 8 10E3/UL (ref 4–11)

## 2023-08-09 PROCEDURE — A9585 GADOBUTROL INJECTION: HCPCS | Performed by: EMERGENCY MEDICINE

## 2023-08-09 PROCEDURE — 82374 ASSAY BLOOD CARBON DIOXIDE: CPT | Performed by: EMERGENCY MEDICINE

## 2023-08-09 PROCEDURE — 255N000002 HC RX 255 OP 636: Performed by: EMERGENCY MEDICINE

## 2023-08-09 PROCEDURE — 85041 AUTOMATED RBC COUNT: CPT | Performed by: EMERGENCY MEDICINE

## 2023-08-09 PROCEDURE — 99285 EMERGENCY DEPT VISIT HI MDM: CPT | Mod: 25

## 2023-08-09 PROCEDURE — 82310 ASSAY OF CALCIUM: CPT | Performed by: EMERGENCY MEDICINE

## 2023-08-09 PROCEDURE — 93005 ELECTROCARDIOGRAM TRACING: CPT | Mod: RTG

## 2023-08-09 PROCEDURE — 70553 MRI BRAIN STEM W/O & W/DYE: CPT

## 2023-08-09 PROCEDURE — 36415 COLL VENOUS BLD VENIPUNCTURE: CPT | Performed by: EMERGENCY MEDICINE

## 2023-08-09 PROCEDURE — 85018 HEMOGLOBIN: CPT | Performed by: EMERGENCY MEDICINE

## 2023-08-09 RX ORDER — METHYLPREDNISOLONE 4 MG
TABLET, DOSE PACK ORAL
Qty: 21 TABLET | Refills: 0 | Status: SHIPPED | OUTPATIENT
Start: 2023-08-09 | End: 2024-04-03

## 2023-08-09 RX ORDER — VALACYCLOVIR HYDROCHLORIDE 1 G/1
1000 TABLET, FILM COATED ORAL 3 TIMES DAILY
Qty: 21 TABLET | Refills: 0 | Status: SHIPPED | OUTPATIENT
Start: 2023-08-09 | End: 2023-08-16

## 2023-08-09 RX ORDER — GADOBUTROL 604.72 MG/ML
20 INJECTION INTRAVENOUS ONCE
Status: COMPLETED | OUTPATIENT
Start: 2023-08-09 | End: 2023-08-09

## 2023-08-09 RX ADMIN — GADOBUTROL 20 ML: 604.72 INJECTION INTRAVENOUS at 16:07

## 2023-08-09 ASSESSMENT — ACTIVITIES OF DAILY LIVING (ADL)
ADLS_ACUITY_SCORE: 35
ADLS_ACUITY_SCORE: 35

## 2023-08-09 NOTE — ED PROVIDER NOTES
"  History     Chief Complaint:  Numbness       The history is provided by the patient.      Keo Pinto is a 38 year old male who presents with left facial numbness.  He does have a history of migraines although generally they have been well-controlled with Excedrin and is managed by his primary care clinic.  He does not typically get an aura or any complex migraines.  He describes a diminished sensitivity that affected the left lower face and now affects left upper face.  He is not aware of any motor deficit or difficulty closing the eye.  No difficulty speaking or swallowing.  No recent fever or URI symptoms.  No trauma.  No symptoms affecting the arm or the leg.  He is able to ambulate without difficulty.     Review of External Notes:   Primary care progress note reviewed from June 5, 2023 when the patient was seen for anxiety and hypertension and obesity.      Medications:    Alprazolam   Clotrimazole   Cyclobenzaprine   Escitalopram   Lisinopril   Metoprolol succinate     Past Medical History:    Gallstone   Hypertension  Migraines   Esophageal reflux     Past Surgical History:    Adenoidectomy   EGD  Cholecystectomy   PE Tubes   Cholangiopancreatogram      Physical Exam   Patient Vitals for the past 24 hrs:   BP Temp Temp src Pulse Resp SpO2 Height Weight   08/09/23 1131 (!) 166/111 97.3  F (36.3  C) Temporal 91 18 97 % 1.803 m (5' 11\") (!) 204.1 kg (450 lb)        Physical Exam  Constitutional:       General: He is not in acute distress.     Appearance: Normal appearance. He is not toxic-appearing.   HENT:      Head: Atraumatic.      Right Ear: Tympanic membrane, ear canal and external ear normal.      Left Ear: Tympanic membrane, ear canal and external ear normal.      Nose: Nose normal.      Mouth/Throat:      Mouth: Mucous membranes are moist.      Pharynx: Oropharynx is clear.   Eyes:      General: No scleral icterus.     Extraocular Movements: Extraocular movements intact.      Conjunctiva/sclera: " Conjunctivae normal.      Pupils: Pupils are equal, round, and reactive to light.   Cardiovascular:      Rate and Rhythm: Normal rate and regular rhythm.      Heart sounds: Normal heart sounds.   Pulmonary:      Effort: Pulmonary effort is normal. No respiratory distress.      Breath sounds: Normal breath sounds.   Abdominal:      General: There is no distension.      Palpations: Abdomen is soft.      Tenderness: There is no abdominal tenderness.   Musculoskeletal:         General: No deformity.      Cervical back: Normal range of motion and neck supple. No rigidity.   Lymphadenopathy:      Cervical: No cervical adenopathy.   Skin:     General: Skin is warm.      Capillary Refill: Capillary refill takes less than 2 seconds.   Neurological:      Mental Status: He is alert and oriented to person, place, and time.      Comments: No facial asymmetry.  Speech is fluent.  There is diminished sensation of the left upper and lower face.  Extraocular movements are intact.   strength 5 out of 5 in the bilateral hands.  Straight leg raise 5 out of 5 ankle bilaterally.  Sensation to light touch intact and equal bilaterally in the arms and legs.  No finger-nose dysmetria.  No arm drift.   Psychiatric:         Mood and Affect: Mood normal.         Behavior: Behavior normal.           Emergency Department Course     ECG results from 08/09/23   EKG 12-lead, tracing only     Value    Systolic Blood Pressure     Diastolic Blood Pressure     Ventricular Rate 92    Atrial Rate 92    LA Interval 170    QRS Duration 92        QTc 445    P Axis 29    R AXIS -25    T Axis 44    Interpretation ECG      Sinus rhythm  Normal ECG  When compared with ECG of 23-MAY-2023 12:49,  No significant change was found          Imaging:  MR Brain w/o & w Contrast   Final Result   IMPRESSION:   1.  Subtle blush of contrast with corresponding susceptibility artifact at the left paramedian frontal lobe. This may represent benign vascular lesion  such as cholangitis ectasia. Recommend follow-up contrast enhanced brain MRI in 8-12 weeks to document    stability.   2.  Otherwise unremarkable brain MRI.         Report per radiology    Laboratory:  Labs Ordered and Resulted from Time of ED Arrival to Time of ED Departure   BASIC METABOLIC PANEL - Normal       Result Value    Sodium 136      Potassium 4.0      Chloride 101      Carbon Dioxide (CO2) 24      Anion Gap 11      Urea Nitrogen 7.6      Creatinine 0.90      Calcium 9.5      Glucose 95      GFR Estimate >90     CBC WITH PLATELETS AND DIFFERENTIAL    WBC Count 8.0      RBC Count 5.31      Hemoglobin 16.1      Hematocrit 46.1      MCV 87      MCH 30.3      MCHC 34.9      RDW 12.3      Platelet Count 264      % Neutrophils 73      % Lymphocytes 21      % Monocytes 5      % Eosinophils 1      % Basophils 0      % Immature Granulocytes 0      NRBCs per 100 WBC 0      Absolute Neutrophils 5.9      Absolute Lymphocytes 1.7      Absolute Monocytes 0.4      Absolute Eosinophils 0.1      Absolute Basophils 0.0      Absolute Immature Granulocytes 0.0      Absolute NRBCs 0.0        Emergency Department Course & Assessments:       Interventions:  Medications   gadobutrol (GADAVIST) injection 20 mL (20 mLs Intravenous $Given 8/9/23 5553)        Assessments:  1710 I rechecked the patient and explained findings. We discussed plan for discharge and patient is in agreement with plan.       Disposition:  The patient was discharged to home.     Impression & Plan      Medical Decision Making:  This patient is a pleasant 38-year-old male who presents with diminished sensation of the left face for the last 4 days.  This has been progressive.  Nursing staff had felt there may have been a subtle facial droop during the intake process although I do not appreciate this.  No other neurologic deficit.  EKG does not demonstrate atrial fibrillation.  He has never had a CVA before.    Differential diagnosis includes complex migraines  as he does have migraines.  He is not currently having a headache though.  Early Bell's palsy is more likely given the progression of his symptoms.  MRI does not demonstrate CVA.  There is questionably artifact versus a vascular lesion.    Patient will be treated for Bell's palsy.  He has been referred to neurology for further workup to ensure resolution of his symptoms as well as for further consideration of complex migraines and to follow-up his MRI findings.  This was discussed with the patient.  A referral was sent to [facility] of neurology.    Diagnosis:    ICD-10-CM    1. Left-sided Bell's palsy  G51.0            Discharge Medications:  New Prescriptions    METHYLPREDNISOLONE (MEDROL DOSEPAK) 4 MG TABLET THERAPY PACK    Follow Package Directions    VALACYCLOVIR (VALTREX) 1000 MG TABLET    Take 1 tablet (1,000 mg) by mouth 3 times daily for 7 days          Scribe Disclosure:  Lamberto PYLE, am serving as a scribe at 5:19 PM on 8/9/2023 to document services personally performed by Phill Canchola MD based on my observations and the provider's statements to me.   8/9/2023   Phill Canchola MD McRoberts, Sean Edward, MD  08/09/23 0275

## 2023-08-09 NOTE — ED TRIAGE NOTES
Pt here with c/o L sided facial numbness x4 days. L eyelid and L lip facail droop noted. Denies HA, visual changes, recent URI, fever, n/v. Denies gait issues, slurred speech. Had  equal, no arm drift. Not on blood thinners. ABC intact. A&Ox4.

## 2023-08-09 NOTE — ED NOTES
PIT/Triage Evaluation    Patient presented with facial numbness. The patient reports left sided facial numbness started three days ago. This morning he woke up and the numbness had spread to a larger area of his face. Denies headaches, fever, cough, diarrhea, ear pain, dysphagia, visual changes, fever, gait issues, other unilateral numbness and slurred speech.     Exam is notable for:  Physical Exam  Vitals and nursing note reviewed.   Constitutional:       General: He is not in acute distress.     Appearance: He is not ill-appearing.   HENT:      Head: Normocephalic and atraumatic.      Right Ear: External ear normal.      Left Ear: External ear normal.      Nose: Nose normal.      Mouth/Throat:      Mouth: Mucous membranes are moist.   Eyes:      Extraocular Movements: Extraocular movements intact.      Conjunctiva/sclera: Conjunctivae normal.      Pupils: Pupils are equal, round, and reactive to light.   Pulmonary:      Effort: Pulmonary effort is normal. No respiratory distress.   Musculoskeletal:         General: No deformity or signs of injury.      Cervical back: Normal range of motion and neck supple.   Skin:     General: Skin is warm and dry.      Findings: No rash.   Neurological:      Mental Status: He is alert and oriented to person, place, and time.      Cranial Nerves: Cranial nerve deficit (decreased sensation to LT to left face) present.      Motor: No weakness.      Comments: No obvious facial droop or asymmetry noted   Psychiatric:         Behavior: Behavior normal.           Appropriate interventions for symptom management were initiated if applicable.  Appropriate diagnostic tests were initiated if indicated.    Important information for subsequent clinician:  Poss Wright's but no obvious droop.  MRI ordered.     I briefly evaluated the patient and developed an initial plan of care. I discussed this plan and explained that this brief interaction does not constitute a full evaluation. Patient/family  understands that they should wait to be fully evaluated and discuss any test results with another clinician prior to leaving the hospital.       Francis Mathur MD  08/09/23 5677

## 2023-08-10 LAB
ATRIAL RATE - MUSE: 92 BPM
DIASTOLIC BLOOD PRESSURE - MUSE: NORMAL MMHG
INTERPRETATION ECG - MUSE: NORMAL
P AXIS - MUSE: 29 DEGREES
PR INTERVAL - MUSE: 170 MS
QRS DURATION - MUSE: 92 MS
QT - MUSE: 360 MS
QTC - MUSE: 445 MS
R AXIS - MUSE: -25 DEGREES
SYSTOLIC BLOOD PRESSURE - MUSE: NORMAL MMHG
T AXIS - MUSE: 44 DEGREES
VENTRICULAR RATE- MUSE: 92 BPM

## 2023-09-15 ENCOUNTER — TELEPHONE (OUTPATIENT)
Dept: CARDIOLOGY | Facility: CLINIC | Age: 38
End: 2023-09-15
Payer: COMMERCIAL

## 2023-09-15 NOTE — TELEPHONE ENCOUNTER
Please be advised that we have attempted to reach this patient to schedule their cardiology follow up appointment however three attempts have been made to contact patient and they have not returned our call.  No further attempts to reach this patient will by made by scheduling team.  Please contact this patient to encourage them to call our office at [phone] and schedule this order if it is still clinically recommended.      Thank you for allowing [facility] to participate in this patients healthcare needs.

## 2024-03-30 ENCOUNTER — HOSPITAL ENCOUNTER (EMERGENCY)
Facility: CLINIC | Age: 39
Discharge: HOME | End: 2024-03-30
Attending: EMERGENCY MEDICINE | Admitting: EMERGENCY MEDICINE
Payer: COMMERCIAL

## 2024-03-30 VITALS
HEART RATE: 97 BPM | SYSTOLIC BLOOD PRESSURE: 158 MMHG | OXYGEN SATURATION: 97 % | DIASTOLIC BLOOD PRESSURE: 110 MMHG | WEIGHT: 315 LBS | RESPIRATION RATE: 20 BRPM | BODY MASS INDEX: 44.1 KG/M2 | TEMPERATURE: 98 F | HEIGHT: 71 IN

## 2024-03-30 DIAGNOSIS — R61 DIAPHORESIS: ICD-10-CM

## 2024-03-30 DIAGNOSIS — R79.89 ELEVATED TSH: ICD-10-CM

## 2024-03-30 LAB
ALBUMIN SERPL BCG-MCNC: 4.2 G/DL (ref 3.5–5.2)
ALP SERPL-CCNC: 75 U/L (ref 40–150)
ALT SERPL W P-5'-P-CCNC: 59 U/L (ref 0–70)
ANION GAP SERPL CALCULATED.3IONS-SCNC: 11 MMOL/L (ref 7–15)
AST SERPL W P-5'-P-CCNC: 35 U/L (ref 0–45)
BASOPHILS # BLD AUTO: 0 10E3/UL (ref 0–0.2)
BASOPHILS NFR BLD AUTO: 0 %
BILIRUB SERPL-MCNC: 0.6 MG/DL
BUN SERPL-MCNC: 11.3 MG/DL (ref 6–20)
CALCIUM SERPL-MCNC: 9.2 MG/DL (ref 8.6–10)
CHLORIDE SERPL-SCNC: 100 MMOL/L (ref 98–107)
CREAT SERPL-MCNC: 0.99 MG/DL (ref 0.67–1.17)
DEPRECATED HCO3 PLAS-SCNC: 25 MMOL/L (ref 22–29)
EGFRCR SERPLBLD CKD-EPI 2021: >90 ML/MIN/1.73M2
EOSINOPHIL # BLD AUTO: 0.1 10E3/UL (ref 0–0.7)
EOSINOPHIL NFR BLD AUTO: 2 %
ERYTHROCYTE [DISTWIDTH] IN BLOOD BY AUTOMATED COUNT: 12.7 % (ref 10–15)
GLUCOSE SERPL-MCNC: 111 MG/DL (ref 70–99)
HCT VFR BLD AUTO: 46.8 % (ref 40–53)
HGB BLD-MCNC: 16.1 G/DL (ref 13.3–17.7)
HOLD SPECIMEN: NORMAL
IMM GRANULOCYTES # BLD: 0 10E3/UL
IMM GRANULOCYTES NFR BLD: 1 %
LYMPHOCYTES # BLD AUTO: 2.7 10E3/UL (ref 0.8–5.3)
LYMPHOCYTES NFR BLD AUTO: 33 %
MCH RBC QN AUTO: 30.3 PG (ref 26.5–33)
MCHC RBC AUTO-ENTMCNC: 34.4 G/DL (ref 31.5–36.5)
MCV RBC AUTO: 88 FL (ref 78–100)
MONOCYTES # BLD AUTO: 0.6 10E3/UL (ref 0–1.3)
MONOCYTES NFR BLD AUTO: 8 %
NEUTROPHILS # BLD AUTO: 4.7 10E3/UL (ref 1.6–8.3)
NEUTROPHILS NFR BLD AUTO: 56 %
NRBC # BLD AUTO: 0 10E3/UL
NRBC BLD AUTO-RTO: 0 /100
PLATELET # BLD AUTO: 303 10E3/UL (ref 150–450)
POTASSIUM SERPL-SCNC: 3.9 MMOL/L (ref 3.4–5.3)
PROT SERPL-MCNC: 7.7 G/DL (ref 6.4–8.3)
RBC # BLD AUTO: 5.31 10E6/UL (ref 4.4–5.9)
SODIUM SERPL-SCNC: 136 MMOL/L (ref 135–145)
T4 FREE SERPL-MCNC: 1.04 NG/DL (ref 0.9–1.7)
TSH SERPL DL<=0.005 MIU/L-ACNC: 5.17 UIU/ML (ref 0.3–4.2)
WBC # BLD AUTO: 8.2 10E3/UL (ref 4–11)

## 2024-03-30 PROCEDURE — 36415 COLL VENOUS BLD VENIPUNCTURE: CPT | Performed by: EMERGENCY MEDICINE

## 2024-03-30 PROCEDURE — 80053 COMPREHEN METABOLIC PANEL: CPT | Performed by: EMERGENCY MEDICINE

## 2024-03-30 PROCEDURE — 84439 ASSAY OF FREE THYROXINE: CPT | Performed by: EMERGENCY MEDICINE

## 2024-03-30 PROCEDURE — 84443 ASSAY THYROID STIM HORMONE: CPT | Performed by: EMERGENCY MEDICINE

## 2024-03-30 PROCEDURE — 99284 EMERGENCY DEPT VISIT MOD MDM: CPT

## 2024-03-30 PROCEDURE — 93005 ELECTROCARDIOGRAM TRACING: CPT

## 2024-03-30 PROCEDURE — 85025 COMPLETE CBC W/AUTO DIFF WBC: CPT | Performed by: EMERGENCY MEDICINE

## 2024-03-30 ASSESSMENT — COLUMBIA-SUICIDE SEVERITY RATING SCALE - C-SSRS
6. HAVE YOU EVER DONE ANYTHING, STARTED TO DO ANYTHING, OR PREPARED TO DO ANYTHING TO END YOUR LIFE?: NO
1. IN THE PAST MONTH, HAVE YOU WISHED YOU WERE DEAD OR WISHED YOU COULD GO TO SLEEP AND NOT WAKE UP?: NO
2. HAVE YOU ACTUALLY HAD ANY THOUGHTS OF KILLING YOURSELF IN THE PAST MONTH?: NO

## 2024-03-30 ASSESSMENT — ACTIVITIES OF DAILY LIVING (ADL)
ADLS_ACUITY_SCORE: 38
ADLS_ACUITY_SCORE: 38
ADLS_ACUITY_SCORE: 36

## 2024-03-30 NOTE — ED PROVIDER NOTES
History     Chief Complaint:  Leg Pain       HPI   Keo Pinto is a 38 year old male with a history of hypertension presents to the emergency department with a complaint of sweating.  Patient reports that for the past several weeks he has had a cold and tingling feeling all over his legs that has spread to his body.  He states that that comes intermittently.  He states that over the past several days he has had some intermittent sweating.  Today he laid down to go to sleep and he just started sweating profusely.  Patient denies any pain, shortness of breath, nausea, vomiting.  He has not had any falls or trauma.      Independent Historian:   None - Patient Only    Review of External Notes:   Reviewed patient's ED visit from 8/9/2023, patient has a history of Bell's palsy.      Medications:    lisinopril (ZESTRIL) 40 MG tablet  metoprolol succinate ER (TOPROL XL) 200 MG 24 hr tablet  ALPRAZolam (XANAX) 0.5 MG tablet  clotrimazole (LOTRIMIN) 1 % external cream  cyclobenzaprine (FLEXERIL) 10 MG tablet  escitalopram (LEXAPRO) 10 MG tablet  methylPREDNISolone (MEDROL DOSEPAK) 4 MG tablet therapy pack  valACYclovir (VALTREX) 1000 mg tablet        Past Medical History:    Past Medical History:   Diagnosis Date    Esophageal reflux     Gallstone pancreatitis 2/28/2019    Hypertension     Migraine headache     Obese        Past Surgical History:    Past Surgical History:   Procedure Laterality Date    ADENOIDECTOMY      ENDOSCOPIC RETROGRADE CHOLANGIOPANCREATOGRAM N/A 2/28/2019    Procedure: ENDOSCOPIC RETROGRADE CHOLANGIOPANCREATOGRAM with Sphincterotomy and Stone Extraction.;  Surgeon: Amina Hernandez MD;  Location:  OR    ESOPHAGOSCOPY, GASTROSCOPY, DUODENOSCOPY (EGD), COMBINED N/A 2/28/2019    Procedure: COMBINED ENDOSCOPIC ULTRASOUND, Endoscopic Retrograde Cholangiopancreatoscopy;  Surgeon: Amina Hernandez MD;  Location:  OR    LAPAROSCOPIC CHOLECYSTECTOMY N/A 3/3/2019    Procedure:  LAPAROSCOPIC CHOLECYSTECTOMY;  Surgeon: Leland Crowder MD;  Location: [facility] OR    PE TUBES      As a young child        Physical Exam   No data found.       Physical Exam  General: Well-nourished, resting comfortably when I enter the room  Eyes: Pupils equal, conjunctivae pink no scleral icterus or conjunctival injection  ENT:  Moist mucus membranes  Respiratory:  Lungs clear to auscultation bilaterally, no crackles/rubs/wheezes.  Good air movement  CV: Normal rate and rhythm, no murmurs  GI:  Abdomen soft and non-distended.  No tenderness, guarding or rebound  Skin: Warm, dry.  No rashes or petechiae  Musculoskeletal: No peripheral edema or calf tenderness  Neuro: Alert and oriented to person/place/time  Psychiatric: Normal affect      Emergency Department Course     ECG results from 03/30/24   EKG 12-lead, tracing only     Value    Systolic Blood Pressure     Diastolic Blood Pressure     Ventricular Rate 96    Atrial Rate 96    MA Interval 172    QRS Duration 94        QTc 449    P Axis 33    R AXIS -29    T Axis 52    Interpretation ECG      Sinus rhythm  Possible Left atrial enlargement  Borderline ECG  When compared with ECG of 09-AUG-2023 16:49,  No significant change was found           Imaging:  No orders to display          Laboratory:  Labs Ordered and Resulted from Time of ED Arrival to Time of ED Departure   COMPREHENSIVE METABOLIC PANEL - Abnormal       Result Value    Sodium 136      Potassium 3.9      Carbon Dioxide (CO2) 25      Anion Gap 11      Urea Nitrogen 11.3      Creatinine 0.99      GFR Estimate >90      Calcium 9.2      Chloride 100      Glucose 111 (*)     Alkaline Phosphatase 75      AST 35      ALT 59      Protein Total 7.7      Albumin 4.2      Bilirubin Total 0.6     TSH WITH FREE T4 REFLEX - Abnormal    TSH 5.17 (*)    T4 FREE - Normal    Free T4 1.04     CBC WITH PLATELETS AND DIFFERENTIAL    WBC Count 8.2      RBC Count 5.31      Hemoglobin 16.1      Hematocrit 46.8      MCV  88      MCH 30.3      MCHC 34.4      RDW 12.7      Platelet Count 303      % Neutrophils 56      % Lymphocytes 33      % Monocytes 8      % Eosinophils 2      % Basophils 0      % Immature Granulocytes 1      NRBCs per 100 WBC 0      Absolute Neutrophils 4.7      Absolute Lymphocytes 2.7      Absolute Monocytes 0.6      Absolute Eosinophils 0.1      Absolute Basophils 0.0      Absolute Immature Granulocytes 0.0      Absolute NRBCs 0.0          Procedures       Emergency Department Course & Assessments:    Interventions:  Medications - No data to display     Assessments:  On reevaluation, the patient is resting calmly in the room.  No complaints.    Independent Interpretation (X-rays, CTs, rhythm strip):  None    Consultations/Discussion of Management or Tests:  None        Social Determinants of Health affecting care:   None    Disposition:  The patient was discharged.     Impression & Plan    CMS Diagnoses: None      MIPS (If applicable):  N/A    Medical Decision Makin-year-old male presents emergency department with a complaint of diaphoresis.  Patient reports that he has had some over all body tingling and cold feeling, and he has had several episodes of sweating.  On exam patient is well-appearing.  His vital signs are within acceptable limits.  He is hypertensive, but is currently taking his blood pressure medicine in the room during the exam as he normally takes it in the morning.  Patient reports that he does not have any history of problems with his thyroid.  Workup reveals a slightly elevated TSH, but a normal T4.  Patient will follow-up with this with his primary care provider, I do not think that he has having a thyroid emergency.  All other electrolytes are within acceptable limits.  He is not anemic.  No signs of infection.  Patient is feeling reassured.  He will follow-up with his primary care provider, and has scheduled appointment for next week.  Patient is discharged home.      Diagnosis:     ICD-10-CM    1. Diaphoresis  R61       2. Elevated TSH  R79.89            Discharge Medications:  Discharge Medication List as of 3/30/2024 10:12 AM             Awa Jara MD  3/30/2024   Awa Jara MD Richardson, Elizabeth, MD  03/31/24 1453

## 2024-03-30 NOTE — DISCHARGE INSTRUCTIONS
Please return to the emergency department if your symptoms increase.    Please follow-up with your primary care provider at your scheduled appointment.  Please have your thyroid levels rechecked by your primary care physician.

## 2024-03-30 NOTE — ED TRIAGE NOTES
"Patient reports multiple symptoms upon triage.  He reports feeling \"pins and needles\" in his legs for \"a few weeks\".  He also reports intermittent diaphoresis.  This morning he reports some mild SOB.  History of HTN.  ABCs intact, A&Ox4.     Triage Assessment (Adult)       Row Name 03/30/24 0749          Triage Assessment    Airway WDL WDL        Respiratory WDL    Respiratory WDL WDL        Skin Circulation/Temperature WDL    Skin Circulation/Temperature WDL WDL        Cardiac WDL    Cardiac WDL WDL        Peripheral/Neurovascular WDL    Peripheral Neurovascular WDL WDL        Cognitive/Neuro/Behavioral WDL    Cognitive/Neuro/Behavioral WDL WDL                     " Name: Mariaelena Zendejas      : 2017      MRN: [id number]  Encounter Provider: EDWIN Chu  Encounter Date: 2024   Encounter department: [facility]    Assessment & Plan     1. Encounter for well child visit at 6 years of age  Assessment & Plan:  - UTD with immunizations.   - UTD with dental and vision exams.   - Flu shot given in office today.  - Continue routine follow up with appropriate specialties.       2. Neurodegenerative disorder (HCC)  Assessment & Plan:  - At baseline.   - Continue routine follow up with Developmental Pediatrics.       3. Feeding difficulty in child  Assessment & Plan:  - Continue routine follow up with Pediatric GI.       4. Fine motor delay  Assessment & Plan:  - Continue PT/OT.       5. Exercise counseling    6. Nutritional counseling    7. Encounter for immunization  -     influenza vaccine, quadrivalent, 0.5 mL, preservative-free, for adult and pediatric patients 6 mos+ (AFLURIA, FLUARIX, FLULAVAL, FLUZONE)           Subjective     Patient with PMH of neurogenerative disorder, intellectual disability, and global developmental delay presents today for well child visit. She currently follows with developmental pediatrics, pediatric GI, and pediatric Neuro. She receives speech and physical therapy as well as therapy. She is currently seeing GI for feeding difficulties. She is UTD with her immunizations except the flu vaccine. Needs a physical for school. Denies any other concerns or complaints today.           Nutrition and Exercise Counseling:    The patient's Body mass index is 20.45 kg/m². This is 96 %ile (Z= 1.70) based on CDC (Girls, 2-20 Years) BMI-for-age based on BMI available as of 2024.    Nutrition counseling provided:  Avoid juice/sugary drinks, Anticipatory guidance for nutrition given and counseled on healthy eating habits, and 5 servings of fruits/vegetables    Exercise counseling provided:  Reduce screen time to less  than 2 hours per day, 1 hour of aerobic exercise daily, and Take stairs whenever possible      Review of Systems   Constitutional:  Negative for chills and fever.   HENT:  Negative for ear pain and sore throat.    Eyes:  Negative for pain and visual disturbance.   Respiratory:  Negative for cough and shortness of breath.    Cardiovascular:  Negative for chest pain and palpitations.   Gastrointestinal:  Negative for abdominal pain and vomiting.   Genitourinary:  Negative for dysuria and hematuria.   Musculoskeletal:  Negative for back pain and gait problem.   Skin:  Negative for color change and rash.   Neurological:  Negative for seizures and syncope.   Psychiatric/Behavioral:  Negative for agitation.    All other systems reviewed and are negative.      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   • Poor feeding    • Wears glasses 09/02/2019    Followed by Dr Monroy     Past Surgical History:   Procedure Laterality Date   • NO PAST SURGERIES       Family History   Problem Relation Age of Onset   • No Known Problems Mother    • No Known Problems Father    • No Known Problems Sister    • No Known Problems Brother      Social History     Socioeconomic History   • Marital status: Single     Spouse name: None   • Number of children: None   • Years of education: None   • Highest education level: None   Occupational History   • None   Tobacco Use   • Smoking status: Never     Passive exposure: Never   • Smokeless tobacco: Never   Substance and Sexual Activity   • Alcohol use: None   • Drug use: None   •  Sexual activity: None   Other Topics Concern   • None   Social History Narrative    Yaniv lives with  parents, along with 3 maternal 1/2 siblings, >18 yo Philip Corea, 11 yo Richard Corea and 9 yo Renzo Corea        Mother: Valentina Zendejas, a med tech with HS ed,     Father: Dante Zendejas, a [place]  with some college Ed,         6647-9209 school year    County: [facility] [place]: [facility]    -School Name: [employer]    Grade: , Special Education Intermediate Unit Classroom.      -Mariaelena does have an IEP, she receives Speech Therapy, Occupational Therapy.     Physical Therapy needs a script to start this year and [facility] is willing to feeding assessment        -Outpatient: will return to [facility] this summer for Speech Therapy and Occupational Therapy.          Home Health nurse during the week ( school and home)  and on weekends.      Social Determinants of Health     Financial Resource Strain: Not on file   Food Insecurity: Not on file   Transportation Needs: Not on file   Physical Activity: Not on file   Housing Stability: Not on file     Current Outpatient Medications on File Prior to Visit   Medication Sig   • acetaminophen (TYLENOL) 160 mg/5 mL liquid Take 12.8 mL (409.6 mg total) by mouth every 6 (six) hours as needed for mild pain   • brompheniramine-pseudoephedrine-DM 30-2-10 MG/5ML syrup Take 2.5 mL by mouth 4 (four) times a day as needed for congestion, cough or allergies   • Deep Sea Nasal Spray 0.65 % nasal spray USE ONE SPRAY INTO EACH NOSTRIL AS NEEDED (NOSEBLEED)   • Diapers & Supplies (HUGGIES PULL-UPS) MISC Size 7,for child with severe global developmental delays and low tone.   • Misc. Devices MISC Please evaluate and treat for b/l orthotics to improve gait and stability with walking and decrease lower extremity tightness.   • Multiple Vitamin (Multivitamin+) LIQD Take 5 mL by mouth   • pediatric multivitamin-iron (POLY-VI-SOL WITH IRON)  solution Take 1 mL by mouth daily   • Skin Protectants, Misc. (EUCERIN) cream Apply twice daily as needed for dry skin   • sodium chloride (OCEAN) 0.65 % nasal spray 1 spray into each nostril as needed for congestion   • guaiFENesin (ROBITUSSIN) 100 MG/5ML oral liquid Take 10 mL (200 mg total) by mouth 3 (three) times a day as needed for cough (Patient not taking: Reported on 5/11/2023)   • ondansetron (Zofran ODT) 4 mg disintegrating tablet Take 1 tablet (4 mg total) by mouth every 6 (six) hours as needed for nausea or vomiting (Patient not taking: Reported on 5/11/2023)     No Known Allergies  Immunization History   Administered Date(s) Administered   • DTaP 2016, 02/24/2017, 05/03/2017, 12/14/2017   • DTaP / IPV 09/17/2020   • Hep A, ped/adol, 2 dose 12/14/2017, 08/09/2019   • Hep B, Adolescent or Pediatric 2016, 2016, 06/01/2017   • Hepatitis A 12/14/2017, 08/09/2019   • HiB 2016, 02/24/2017, 05/03/2017, 12/14/2017   • Hib (PRP-T) 2016, 12/14/2017   • INFLUENZA 10/22/2020   • IPV 2016, 03/30/2017, 07/13/2017   • Influenza Quadrivalent 3 years and older 10/22/2020   • Influenza, injectable, quadrivalent, preservative free 0.5 mL 01/11/2024   • MMRV 12/14/2017, 09/17/2020   • Pneumococcal Conjugate 13-Valent 2016, 03/30/2017, 06/01/2017, 12/14/2017   • Rotavirus 2016, 02/24/2017, 05/03/2017   • Rotavirus Pentavalent 2016       Objective     Temp 98.1 °F (36.7 °C) (Tympanic)   Resp 20   Ht 4' (1.219 m)   Wt 30.4 kg (67 lb)   BMI 20.45 kg/m²     Physical Exam  Vitals and nursing note reviewed.   Constitutional:       General: She is active.   HENT:      Head: Normocephalic and atraumatic.      Right Ear: Tympanic membrane, ear canal and external ear normal.      Left Ear: Tympanic membrane, ear canal and external ear normal.      Nose: Nose normal.   Eyes:      Conjunctiva/sclera: Conjunctivae normal.   Cardiovascular:      Rate and Rhythm: Normal rate and  regular rhythm.      Heart sounds: Normal heart sounds.   Pulmonary:      Effort: Pulmonary effort is normal.      Breath sounds: Normal breath sounds.   Abdominal:      General: Bowel sounds are normal.      Palpations: Abdomen is soft.   Musculoskeletal:      Cervical back: Normal range of motion.   Skin:     General: Skin is warm and dry.   Neurological:      Mental Status: She is alert.      Comments: At baseline         EDWIN Chu

## 2024-03-31 LAB
ATRIAL RATE - MUSE: 96 BPM
DIASTOLIC BLOOD PRESSURE - MUSE: NORMAL MMHG
INTERPRETATION ECG - MUSE: NORMAL
P AXIS - MUSE: 33 DEGREES
PR INTERVAL - MUSE: 172 MS
QRS DURATION - MUSE: 94 MS
QT - MUSE: 356 MS
QTC - MUSE: 449 MS
R AXIS - MUSE: -29 DEGREES
SYSTOLIC BLOOD PRESSURE - MUSE: NORMAL MMHG
T AXIS - MUSE: 52 DEGREES
VENTRICULAR RATE- MUSE: 96 BPM

## 2024-04-02 ENCOUNTER — MYC MEDICAL ADVICE (OUTPATIENT)
Dept: PEDIATRICS | Facility: CLINIC | Age: 39
End: 2024-04-02
Payer: COMMERCIAL

## 2024-04-02 NOTE — TELEPHONE ENCOUNTER
Pt calling back. Scheduled an OV with  tomorrow.    JAIME Mock  Patient Advocate Liaison (PAL)  [facility]  Ph. [phone] / Fax. [phone]

## 2024-04-02 NOTE — TELEPHONE ENCOUNTER
See the  message from pt. He has a f/up with  on 4/24. Requesting sooner appointment.     Called pt at [phone], not available, so LM to call me back at [phone] to help schedule an appointment this week. Will await for his call back.    JAIME Mock  Patient Advocate Liason (PAL)  [facility]  Ph. [phone] / Fax. [phone]

## 2024-04-03 ENCOUNTER — OFFICE VISIT (OUTPATIENT)
Dept: PEDIATRICS | Facility: CLINIC | Age: 39
End: 2024-04-03
Payer: COMMERCIAL

## 2024-04-03 VITALS
HEIGHT: 71 IN | TEMPERATURE: 97.3 F | BODY MASS INDEX: 44.1 KG/M2 | HEART RATE: 83 BPM | DIASTOLIC BLOOD PRESSURE: 88 MMHG | OXYGEN SATURATION: 98 % | WEIGHT: 315 LBS | RESPIRATION RATE: 16 BRPM | SYSTOLIC BLOOD PRESSURE: 126 MMHG

## 2024-04-03 DIAGNOSIS — I10 PRIMARY HYPERTENSION: Primary | ICD-10-CM

## 2024-04-03 DIAGNOSIS — E66.813 CLASS 3 OBESITY: ICD-10-CM

## 2024-04-03 DIAGNOSIS — R23.2 HOT FLASHES: ICD-10-CM

## 2024-04-03 LAB — HBA1C MFR BLD: 5.2 % (ref 0–5.6)

## 2024-04-03 PROCEDURE — 99214 OFFICE O/P EST MOD 30 MIN: CPT | Performed by: INTERNAL MEDICINE

## 2024-04-03 PROCEDURE — 36415 COLL VENOUS BLD VENIPUNCTURE: CPT | Performed by: INTERNAL MEDICINE

## 2024-04-03 PROCEDURE — 80061 LIPID PANEL: CPT | Performed by: INTERNAL MEDICINE

## 2024-04-03 PROCEDURE — 83036 HEMOGLOBIN GLYCOSYLATED A1C: CPT | Performed by: INTERNAL MEDICINE

## 2024-04-03 PROCEDURE — 83835 ASSAY OF METANEPHRINES: CPT | Mod: 90 | Performed by: INTERNAL MEDICINE

## 2024-04-03 PROCEDURE — 99000 SPECIMEN HANDLING OFFICE-LAB: CPT | Performed by: INTERNAL MEDICINE

## 2024-04-03 RX ORDER — AMLODIPINE BESYLATE 5 MG/1
5 TABLET ORAL DAILY
Qty: 30 TABLET | Refills: 2 | Status: SHIPPED | OUTPATIENT
Start: 2024-04-03 | End: 2024-04-26

## 2024-04-03 ASSESSMENT — PAIN SCALES - GENERAL: PAINLEVEL: NO PAIN (0)

## 2024-04-03 NOTE — PROGRESS NOTES
Assessment & Plan       ICD-10-CM    1. Primary hypertension  I10 Metanephrines Plasma Free     Hemoglobin A1c     Lipid panel reflex to direct LDL Fasting     amLODIPine (NORVASC) 5 MG tablet     Metanephrines Plasma Free     Hemoglobin A1c     Lipid panel reflex to direct LDL Fasting      2. Hot flashes  R23.2 Metanephrines Plasma Free     Hemoglobin A1c     Metanephrines Plasma Free     Hemoglobin A1c      3. Class 3 obesity (H)  E66.01 Semaglutide-Weight Management (WEGOVY) 0.25 MG/0.5ML pen        Hypertension.  He has intermittent episodes of high blood pressure readings.  This is associated with hot flashes and then diaphoresis.  It raises a question of pheochromocytoma.  Will check plasma metanephrine.  Also will check hemoglobin A1c and lipid panel.  Add amlodipine 5 mg daily.    Hot flashes.  Unclear if this is anxiety or something above as above.  Labs as noted above.    Class III obesity.  Struggling to lose weight without medication intervention.  Recommend starting Wegovy at 0.25 mg weekly.  After 4 doses 0.5 mg weekly then after another 4 doses 1 mg weekly and increase to 1.7 then 2.4 mg if he tolerates.    As of now he has a follow-up visit scheduled for April 24.  If his symptoms are under good control and no immediate issues that visit could be canceled.    Tian Perez MD      MED REC REQUIRED  Post Medication Reconciliation Status:  Discharge medications reconciled and changed, see notes/orders    Tian Perez MD      Kemar Yao is a 38 year old, presenting for the following health issues:  Hospital F/U        4/3/2024    12:51 PM   Additional Questions   Roomed by s         4/3/2024    12:51 PM   Patient Reported Additional Medications   Patient reports taking the following new medications none     HPI         ED Followup:    Facility:  [facility] ED   Date of visit: 03/30/2024  Reason for visit: leg pain   Current Status: Leg pain (coldness feeling) has subsided but  having hot flashes, high blood pressure readings, has been taking BP medications as prescribed. Same symptoms since ER visit. Having chills after hot flashes. Laying down seems to help/ease symptoms. Uses a cold pack to help relieve hot flashes.     Maximilian has been experiencing symptoms over the last 1 to 2 months.  His symptoms initially began as a cold sensation in his feet.  After few days he started noticing it in his lower legs and then after another few days and his upper legs as well.  The symptoms would improve with activity or with covering his legs with a blanket.  He did not note any skin changes associated with a cold sensation.    2 to 3 weeks ago he started having symptoms consistent with hot flashes.  He would have episodes which would last approximately 10 to 30 minutes where he was feeling quite flushed, warm, and then would eventually have diaphoresis.  If he checked his blood pressure during those episodes it was high with a systolic 140-150.  As his symptoms improved his blood pressure would go back to what is his typical normal as well.  Typically he is seeing systolic in the 120 range.    He was evaluated in the emergency department last week.  EKG done there was essentially normal.  We reviewed his past cardiac evaluations.  He had a negative stress test last year although only 7.1 METS.    He is taking max dose lisinopril as well as 200 mg metoprolol on a daily basis.  He says he has not missed any med doses.    Is also dealing with class 3 obesity.  His weight has been trending upward over the last few years.  He has tried following diets in the past with some success and also has tried exercise in the past.  He has not been uncomfortable exercising recently due to his high blood pressure readings.  We discussed options to help with weight control and how a multifactorial approach is optimal.          Objective    /88   Pulse 83   Temp 97.3  F (36.3  C) (Tympanic)   Resp 16   Ht  "1.803 m (5' 11\")   Wt (!) 201.4 kg (444 lb 1.6 oz)   SpO2 98%   BMI 61.94 kg/m    Body mass index is 61.94 kg/m .  Physical Exam   GEN: No distress  HEENT: PERRL. No nasal discharge. OP moist.  NECK: Supple. No LAD or TM.  LUNGS: Clear to auscultation bilaterally. No rhonchi, rales, wheezes or retractions.  CV: Regular rate and rhythm.  No murmurs, rubs or gallops. Pulses 2+ radial and PT.   ABD: Bowel sounds positive throughout. Soft, nontender, nondistended. No organomegaly. No masses.  EXTR: No edema.   PSYCH: Normal affect. Well groomed. Good eye contact.             Signed Electronically by: Tian Perez MD    "

## 2024-04-04 LAB
CHOLEST SERPL-MCNC: 130 MG/DL
FASTING STATUS PATIENT QL REPORTED: YES
HDLC SERPL-MCNC: 41 MG/DL
LDLC SERPL CALC-MCNC: 52 MG/DL
NONHDLC SERPL-MCNC: 89 MG/DL
TRIGL SERPL-MCNC: 187 MG/DL

## 2024-04-06 LAB
ANNOTATION COMMENT IMP: NORMAL
METANEPHS SERPL-SCNC: <0.1 NMOL/L
NORMETANEPHRINE SERPL-SCNC: 0.46 NMOL/L

## 2024-04-10 ENCOUNTER — MYC MEDICAL ADVICE (OUTPATIENT)
Dept: PEDIATRICS | Facility: CLINIC | Age: 39
End: 2024-04-10
Payer: COMMERCIAL

## 2024-04-10 NOTE — TELEPHONE ENCOUNTER
See the  message from pt. Pt is starting wegovy 0.25 mg weekly.     After huddling with MTM(Meryl), advised pt that there is no preference - the peak effect for side effect can be 2-3 days after the injection. So, if working from Monday-Friday, may want to take on Thursdays.    JAIME Mock  Patient Advocate Liaison (PAL)  [facility]  Ph. [phone] / Fax. [phone]

## 2024-04-26 ENCOUNTER — OFFICE VISIT (OUTPATIENT)
Dept: PEDIATRICS | Facility: CLINIC | Age: 39
End: 2024-04-26
Payer: COMMERCIAL

## 2024-04-26 VITALS
SYSTOLIC BLOOD PRESSURE: 138 MMHG | BODY MASS INDEX: 42.66 KG/M2 | HEIGHT: 72 IN | OXYGEN SATURATION: 97 % | HEART RATE: 85 BPM | RESPIRATION RATE: 18 BRPM | WEIGHT: 315 LBS | TEMPERATURE: 97.8 F | DIASTOLIC BLOOD PRESSURE: 87 MMHG

## 2024-04-26 DIAGNOSIS — E66.813 CLASS 3 OBESITY: ICD-10-CM

## 2024-04-26 DIAGNOSIS — I10 ESSENTIAL HYPERTENSION: Primary | ICD-10-CM

## 2024-04-26 DIAGNOSIS — F41.1 ANXIETY STATE: ICD-10-CM

## 2024-04-26 DIAGNOSIS — I10 PRIMARY HYPERTENSION: ICD-10-CM

## 2024-04-26 DIAGNOSIS — Z23 NEED FOR VACCINATION: ICD-10-CM

## 2024-04-26 PROCEDURE — 90471 IMMUNIZATION ADMIN: CPT | Performed by: INTERNAL MEDICINE

## 2024-04-26 PROCEDURE — 90746 HEPB VACCINE 3 DOSE ADULT IM: CPT | Performed by: INTERNAL MEDICINE

## 2024-04-26 PROCEDURE — 90472 IMMUNIZATION ADMIN EACH ADD: CPT | Performed by: INTERNAL MEDICINE

## 2024-04-26 PROCEDURE — 99214 OFFICE O/P EST MOD 30 MIN: CPT | Mod: 25 | Performed by: INTERNAL MEDICINE

## 2024-04-26 PROCEDURE — 90715 TDAP VACCINE 7 YRS/> IM: CPT | Performed by: INTERNAL MEDICINE

## 2024-04-26 RX ORDER — METOPROLOL SUCCINATE 200 MG/1
TABLET, EXTENDED RELEASE ORAL
Qty: 90 TABLET | Refills: 3 | Status: SHIPPED | OUTPATIENT
Start: 2024-04-26

## 2024-04-26 RX ORDER — ALPRAZOLAM 0.5 MG
0.5 TABLET ORAL 3 TIMES DAILY PRN
Qty: 30 TABLET | Refills: 1 | Status: SHIPPED | OUTPATIENT
Start: 2024-04-26

## 2024-04-26 RX ORDER — LISINOPRIL 40 MG/1
40 TABLET ORAL DAILY
Qty: 90 TABLET | Refills: 3 | Status: SHIPPED | OUTPATIENT
Start: 2024-04-26

## 2024-04-26 RX ORDER — AMLODIPINE BESYLATE 5 MG/1
5 TABLET ORAL DAILY
Qty: 90 TABLET | Refills: 3 | Status: SHIPPED | OUTPATIENT
Start: 2024-04-26

## 2024-04-26 ASSESSMENT — PAIN SCALES - GENERAL: PAINLEVEL: NO PAIN (0)

## 2024-04-26 NOTE — PROGRESS NOTES
Assessment & Plan       ICD-10-CM    1. Essential hypertension  I10 metoprolol succinate ER (TOPROL XL) 200 MG 24 hr tablet     lisinopril (ZESTRIL) 40 MG tablet      2. Anxiety state  F41.1 ALPRAZolam (XANAX) 0.5 MG tablet      3. Primary hypertension  I10 amLODIPine (NORVASC) 5 MG tablet      4. Class 3 obesity (H)  E66.01 Semaglutide-Weight Management (WEGOVY) 0.5 MG/0.5ML pen      5. Need for vaccination  Z23 TDAP 10-64Y (ADACEL,BOOSTRIX)     HEPATITIS B VACCINE (20 YEARS AND OLDER) (ENGERIX-B/RECOMBIVAX)        Hypertension.  Blood pressure is better controlled.  Will continue with his current regimen of amlodipine, lisinopril, metoprolol.  He will continue to monitor his blood pressures at home and contact me should his blood pressure start to increase.    Anxiety.  Continue to manage with as needed alprazolam.  If he has increasing symptoms we can certainly discuss controller medications.    Obesity, class 3.  Is already seeing weight loss with the initial dose of Wegovy and lifestyle changes.  Increase to 0.5 mg for the following 4 injections.  Assuming he tolerates plan then to continue to escalate dosing monthly.  He should have weight checks in the clinic at least every 3 months with at least a nurse visit.    Health maintenance.  Updated tetanus today initial injection of hepatitis B given today.  Recommend second hep B in 2 months and then in 6 months from today  for the third dose.    Tian Perez MD      Kemar   Maximilian is a 38 year old, presenting for the following health issues:  Consult      4/26/2024    12:47 PM   Additional Questions   Roomed by Estella Crockett           4/26/2024    12:47 PM   Patient Reported Additional Medications   Patient reports taking the following new medications Yes     History of Present Illness       Hypertension: He presents for follow up of hypertension.  He does check blood pressure  regularly outside of the clinic. Outside blood pressures have been over  "140/90. He follows a low salt diet.     Reason for visit:  Follow-Up    He eats 0-1 servings of fruits and vegetables daily.He consumes 0 sweetened beverage(s) daily.He exercises with enough effort to increase his heart rate 10 to 19 minutes per day.  He exercises with enough effort to increase his heart rate 3 or less days per week.   He is taking medications regularly.     HTN. Missed am dose of BP meds, took shortly prior to visit.   BP Readings from Last 3 Encounters:   04/26/24 138/87   04/03/24 126/88   03/30/24 (!) 158/110     Typically 120's / 80's at home.  He has not seen blood pressure spikes as he did over the past few months.  We reviewed the lab work from his visit earlier this month.  Plasma metanephrines were normal.  Discussed that this significantly decreases the risk of pheochromocytoma.    Anxiety.  He continues to have intermittent flares of symptoms.  He uses alprazolam and to treat and it does seem to work well.  For now he would prefer to avoid a daily medication for anxiety management.    Class 3 obesity.  He recently started Wegovy.  He had some side effects with the first dose or 2 but now has tolerated the last injection without symptoms.  We reviewed titration of the medication dose.  He is already experienced weight loss potentially due to the medication making him feel less hungry but also he is initiated lifestyle changes.    Wt Readings from Last 4 Encounters:   04/26/24 (!) 198.3 kg (437 lb 3.2 oz)   04/03/24 (!) 201.4 kg (444 lb 1.6 oz)   03/30/24 (!) 205.1 kg (452 lb 2.6 oz)   08/09/23 (!) 204.1 kg (450 lb)             Objective    /87 (BP Location: Right arm, Patient Position: Sitting, Cuff Size: Adult Large)   Pulse 85   Temp 97.8  F (36.6  C) (Tympanic)   Resp 18   Ht 1.825 m (5' 11.85\")   Wt (!) 198.3 kg (437 lb 3.2 oz)   SpO2 97%   BMI 59.54 kg/m    Body mass index is 59.54 kg/m .  Physical Exam   GEN: No distress  LUNGS: Clear to auscultation bilaterally. No " rhonchi, rales, wheezes or retractions.  CV: Regular rate and rhythm.  No murmurs, rubs or gallops. Pulses 2+ radial.  EXTR: No edema            Signed Electronically by: Tian Perez MD

## 2024-05-30 ENCOUNTER — MYC MEDICAL ADVICE (OUTPATIENT)
Dept: PEDIATRICS | Facility: CLINIC | Age: 39
End: 2024-05-30
Payer: COMMERCIAL

## 2024-05-30 DIAGNOSIS — E66.813 CLASS 3 OBESITY: Primary | ICD-10-CM

## 2024-05-30 NOTE — TELEPHONE ENCOUNTER
See pt MyChart message  -Requesting next dose up of Wegovy   -Scheduled for weight check on 6/27/24 w/ nurse only appt     Rx pended, routing to Dr. Chris TALBERT, RN  Patient Advocate Liaison - PAL RN  [facility]  [phone]

## 2024-06-24 ENCOUNTER — NURSE TRIAGE (OUTPATIENT)
Dept: NURSING | Facility: CLINIC | Age: 39
End: 2024-06-24
Payer: COMMERCIAL

## 2024-06-25 NOTE — TELEPHONE ENCOUNTER
"Caller:   Patient    Situation:   Tooth extraction at 5:30 pm  Got home at 6:30 pm  Passed out while he was sitting down, says he spoke to the parents and they said he may have passed bout for about 5 min or less    No injuries    He was feeling lightheaded when he woke up. Right now, he is not lightheaded, feels a little clammy; says he feels \"80 % normal;\" denies shakiness. By end of call, patient says he feels back to normal.    Says heart rate is relatively normal    BP: 139/111  81      Background:  Has high blood pressure  Takes Wegovy for weight loss    Assessment:  Possible stress-induced from dental appointment        Recommendation:  Disposition: home care    Reviewed care advise with caller. Informed to call back w/ any questions or new concerns.  Caller verbalized understanding of care advice.        Annika Patel RN, BSN  Triage Nurse Advisor      Reason for Disposition   [1] Fear, stress, or pain caused simple fainting AND [2] now alert and feels fine    Additional Information   Negative: Still unconscious   Negative: Difficult to awaken or acting confused (e.g., disoriented, slurred speech)   Negative: Shock suspected (e.g., cold/pale/clammy skin, too weak to stand, low BP, rapid pulse)   Negative: Difficulty breathing   Negative: Bluish (or gray) lips or face now   Negative: Chest pain   Negative: Extra heartbeats, irregular heart beating, or heart is beating very fast  (i.e., \"palpitations\")   Negative: Bleeding (e.g., vomiting blood, rectal bleeding or tarry stools, severe vaginal bleeding)  (Exception: Fainted from sight of small amount of blood; small cut or abrasion.)   Negative: Fainted suddenly after medicine, allergic food or bee sting   Negative: Age > 50 years  (Exception: Occurred > 1 hour ago AND now feels completely fine.)   Negative: History of heart problems (e.g., congestive heart failure, heart attack)   Negative: [1] Fainted > 15 minutes ago AND [2] still feels too weak or dizzy to " stand   Negative: Sounds like a life-threatening emergency to the triager   Negative: [1] Diabetes mellitus AND [2] fainting from low blood glucose (i.e.,  70 mg/dl [3.9 mmol/l] or below)   Negative: Seizure suspected (e.g., muscle jerking or shaking followed by confusion)   Negative: Heat exhaustion suspected (i.e., dehydration from heat exposure)   Negative: [1] Fainted > 15 minutes ago AND [2] still looks pale (pale skin, pallor)   Negative: [1] Fainted > 15 minutes ago AND [2] still feels weak or dizzy   Negative: Occurred during exercise   Negative: Any head or face injury   Negative: Pregnant or possibly pregnant   Negative: Fainted 2 times in one day   Negative: [1] Drinking very little AND [2] dehydration suspected (e.g., no urine > 12 hours, very dry mouth, very lightheaded)   Negative: [1] Age > 50 years  AND [2] now alert and feels fine   Negative: Patient sounds very sick or weak to the triager   Negative: [1] All other patients AND [2] now alert and feels fine  (Exception: SIMPLE FAINT due to stress, pain, prolonged standing, or suddenly standing)   Negative: Simple fainting is a chronic symptom (has occurred multiple times)   Negative: [1] Sudden standing caused simple fainting AND [2] now alert and feels fine   Negative: [1] Prolonged standing caused simple fainting AND [2] now alert and feels fine    Protocols used: [id number]

## 2024-06-27 ENCOUNTER — MYC MEDICAL ADVICE (OUTPATIENT)
Dept: PEDIATRICS | Facility: CLINIC | Age: 39
End: 2024-06-27

## 2024-06-27 DIAGNOSIS — E66.813 CLASS 3 OBESITY: Primary | ICD-10-CM

## 2024-06-29 ENCOUNTER — HEALTH MAINTENANCE LETTER (OUTPATIENT)
Age: 39
End: 2024-06-29

## 2024-07-12 ENCOUNTER — HOSPITAL ENCOUNTER (EMERGENCY)
Facility: CLINIC | Age: 39
Discharge: HOME | End: 2024-07-12
Attending: EMERGENCY MEDICINE | Admitting: EMERGENCY MEDICINE
Payer: COMMERCIAL

## 2024-07-12 ENCOUNTER — MYC MEDICAL ADVICE (OUTPATIENT)
Dept: PEDIATRICS | Facility: CLINIC | Age: 39
End: 2024-07-12
Payer: COMMERCIAL

## 2024-07-12 VITALS
WEIGHT: 315 LBS | BODY MASS INDEX: 44.1 KG/M2 | HEIGHT: 71 IN | HEART RATE: 98 BPM | DIASTOLIC BLOOD PRESSURE: 106 MMHG | TEMPERATURE: 98 F | OXYGEN SATURATION: 98 % | RESPIRATION RATE: 18 BRPM | SYSTOLIC BLOOD PRESSURE: 152 MMHG

## 2024-07-12 DIAGNOSIS — R11.10 VOMITING AND DIARRHEA: ICD-10-CM

## 2024-07-12 DIAGNOSIS — E66.813 CLASS 3 OBESITY: ICD-10-CM

## 2024-07-12 DIAGNOSIS — R19.7 VOMITING AND DIARRHEA: ICD-10-CM

## 2024-07-12 LAB
ALBUMIN SERPL BCG-MCNC: 4.2 G/DL (ref 3.5–5.2)
ALP SERPL-CCNC: 76 U/L (ref 40–150)
ALT SERPL W P-5'-P-CCNC: 97 U/L (ref 0–70)
ANION GAP SERPL CALCULATED.3IONS-SCNC: 14 MMOL/L (ref 7–15)
AST SERPL W P-5'-P-CCNC: 54 U/L (ref 0–45)
BASOPHILS # BLD AUTO: 0 10E3/UL (ref 0–0.2)
BASOPHILS NFR BLD AUTO: 0 %
BILIRUB SERPL-MCNC: 0.6 MG/DL
BUN SERPL-MCNC: 6.3 MG/DL (ref 6–20)
CALCIUM SERPL-MCNC: 9.3 MG/DL (ref 8.6–10)
CHLORIDE SERPL-SCNC: 103 MMOL/L (ref 98–107)
CREAT SERPL-MCNC: 0.92 MG/DL (ref 0.67–1.17)
DEPRECATED HCO3 PLAS-SCNC: 22 MMOL/L (ref 22–29)
EGFRCR SERPLBLD CKD-EPI 2021: >90 ML/MIN/1.73M2
EOSINOPHIL # BLD AUTO: 0.1 10E3/UL (ref 0–0.7)
EOSINOPHIL NFR BLD AUTO: 1 %
ERYTHROCYTE [DISTWIDTH] IN BLOOD BY AUTOMATED COUNT: 13 % (ref 10–15)
GLUCOSE SERPL-MCNC: 94 MG/DL (ref 70–99)
HCT VFR BLD AUTO: 48.2 % (ref 40–53)
HGB BLD-MCNC: 16.6 G/DL (ref 13.3–17.7)
HOLD SPECIMEN: NORMAL
IMM GRANULOCYTES # BLD: 0 10E3/UL
IMM GRANULOCYTES NFR BLD: 0 %
LYMPHOCYTES # BLD AUTO: 1.1 10E3/UL (ref 0.8–5.3)
LYMPHOCYTES NFR BLD AUTO: 16 %
MCH RBC QN AUTO: 30.4 PG (ref 26.5–33)
MCHC RBC AUTO-ENTMCNC: 34.4 G/DL (ref 31.5–36.5)
MCV RBC AUTO: 88 FL (ref 78–100)
MONOCYTES # BLD AUTO: 0.3 10E3/UL (ref 0–1.3)
MONOCYTES NFR BLD AUTO: 4 %
NEUTROPHILS # BLD AUTO: 5.6 10E3/UL (ref 1.6–8.3)
NEUTROPHILS NFR BLD AUTO: 79 %
NRBC # BLD AUTO: 0 10E3/UL
NRBC BLD AUTO-RTO: 0 /100
PLATELET # BLD AUTO: 245 10E3/UL (ref 150–450)
POTASSIUM SERPL-SCNC: 4.3 MMOL/L (ref 3.4–5.3)
PROT SERPL-MCNC: 7.6 G/DL (ref 6.4–8.3)
RBC # BLD AUTO: 5.46 10E6/UL (ref 4.4–5.9)
SODIUM SERPL-SCNC: 139 MMOL/L (ref 135–145)
WBC # BLD AUTO: 7.1 10E3/UL (ref 4–11)

## 2024-07-12 PROCEDURE — 85025 COMPLETE CBC W/AUTO DIFF WBC: CPT | Performed by: EMERGENCY MEDICINE

## 2024-07-12 PROCEDURE — 36415 COLL VENOUS BLD VENIPUNCTURE: CPT | Performed by: EMERGENCY MEDICINE

## 2024-07-12 PROCEDURE — 99284 EMERGENCY DEPT VISIT MOD MDM: CPT | Mod: 25

## 2024-07-12 PROCEDURE — 96375 TX/PRO/DX INJ NEW DRUG ADDON: CPT

## 2024-07-12 PROCEDURE — 85004 AUTOMATED DIFF WBC COUNT: CPT | Performed by: EMERGENCY MEDICINE

## 2024-07-12 PROCEDURE — 96361 HYDRATE IV INFUSION ADD-ON: CPT

## 2024-07-12 PROCEDURE — 96374 THER/PROPH/DIAG INJ IV PUSH: CPT

## 2024-07-12 PROCEDURE — 250N000011 HC RX IP 250 OP 636: Performed by: EMERGENCY MEDICINE

## 2024-07-12 PROCEDURE — 80053 COMPREHEN METABOLIC PANEL: CPT | Performed by: EMERGENCY MEDICINE

## 2024-07-12 PROCEDURE — 258N000003 HC RX IP 258 OP 636: Performed by: EMERGENCY MEDICINE

## 2024-07-12 RX ORDER — ONDANSETRON 4 MG/1
4 TABLET, ORALLY DISINTEGRATING ORAL EVERY 8 HOURS PRN
Qty: 10 TABLET | Refills: 1 | Status: SHIPPED | OUTPATIENT
Start: 2024-07-12

## 2024-07-12 RX ORDER — ONDANSETRON 2 MG/ML
4 INJECTION INTRAMUSCULAR; INTRAVENOUS EVERY 30 MIN PRN
Status: DISCONTINUED | OUTPATIENT
Start: 2024-07-12 | End: 2024-07-12 | Stop reason: HOSPADM

## 2024-07-12 RX ADMIN — PANTOPRAZOLE SODIUM 40 MG: 40 INJECTION, POWDER, FOR SOLUTION INTRAVENOUS at 13:43

## 2024-07-12 RX ADMIN — SODIUM CHLORIDE 1000 ML: 9 INJECTION, SOLUTION INTRAVENOUS at 13:38

## 2024-07-12 RX ADMIN — ONDANSETRON 4 MG: 2 INJECTION INTRAMUSCULAR; INTRAVENOUS at 13:43

## 2024-07-12 ASSESSMENT — ACTIVITIES OF DAILY LIVING (ADL)
ADLS_ACUITY_SCORE: 38
ADLS_ACUITY_SCORE: 38
ADLS_ACUITY_SCORE: 36

## 2024-07-12 NOTE — ED TRIAGE NOTES
Pt presents with N/V/D. Pt wegovy dose was increased yesterday. Pt reports experiencing symptoms within hours of taking yx and has continued. Denies pain. Pt reports decreased urine output. A & Ox4.      Triage Assessment (Adult)       Row Name 07/12/24 1123          Triage Assessment    Airway WDL WDL        Respiratory WDL    Respiratory WDL WDL        Skin Circulation/Temperature WDL    Skin Circulation/Temperature WDL WDL        Cardiac WDL    Cardiac WDL WDL        Peripheral/Neurovascular WDL    Peripheral Neurovascular WDL WDL        Cognitive/Neuro/Behavioral WDL    Cognitive/Neuro/Behavioral WDL WDL

## 2024-07-12 NOTE — TELEPHONE ENCOUNTER
See patient's MyChart message   - Patient states that he started the increased dose of Wegovy (1.7 mg) and has been experiencing vomiting and diarrhea     Semaglutide-Weight Management (WEGOVY) 1.7 MG/0.75ML pen 3 mL 0 7/5/2024 -- --   Sig - Route: Inject 1.7 mg Subcutaneous once a week - Subcutaneous     Sent a [employer] message to the patient   - Asked the patient questions to gather additional information   - Informed the patient to remain hydrated due to the vomiting and diarrhea     Dr. Perez, please review and advise. Should patient decrease dose of Wegovy?     Ashley URBAN RN   [facility]

## 2024-07-12 NOTE — DISCHARGE INSTRUCTIONS
Discharge Instructions  Gastroenteritis    You have been seen today for vomiting (throwing up) and diarrhea (loose stools), called gastroenteritis. This is usually caused by a virus, but some bacteria, parasites, medicines or other medical conditions can cause similar symptoms. At this time your provider does not find that your vomiting and diarrhea is a sign of anything dangerous or life-threatening.  However, sometimes the signs of serious illness do not show up right away. Remember that serious problems like appendicitis can look like gastroenteritis at first.       Generally, every Emergency Department visit should have a follow-up clinic visit with either a primary or a specialty clinic/provider. Please follow-up as instructed by your emergency provider today.    Return to the Emergency Department if:  You keep vomiting and you are not able to keep liquids down.  You feel you are getting dehydrated, such as being very thirsty, not urinating (peeing), or feeling faint or lightheaded.   You develop a new fever.  You have abdominal (belly) pain that seems worse than cramps, is in one spot, or is getting worse over time.   You have blood in your vomit or in your diarrhea.  You feel very weak.    What can I do to help myself?  The most important thing to do is to drink clear liquids.  If you have been vomiting a lot, it is best to have only small, frequent sips of liquids.  Drinking too much at once may cause more vomiting. Water is a good first option for rehydration. If you are vomiting often, you must also replace electrolytes (salts and minerals) lost with your illness. Pedialyte  is the best rehydration liquid but many don t like the taste so sports drinks (like Gatorade ) are a good option. Sodas and juice are also options but are high in sugar. Avoid acid liquids (orange), caffeine (coffee) or alcohol. Do not drink milk until you no longer have diarrhea.  After liquids are staying down, you may start eating  mild foods. Soda crackers, toast, plain noodles, gelatin, applesauce and bananas are good first choices.  Avoid foods that have acid, are spicy, fatty or fibrous (such as meats, coarse grains, vegetables). You may start eating these foods again in about 3 days when you are better.  Sometimes treatment includes prescription medicine to prevent nausea (sick to your stomach) and vomiting and to prevent diarrhea. If your provider prescribes these for you, take them as directed.  Nonprescription medicine is available for the treatment of diarrhea and can be very effective.  If you use it, make sure you use the dose recommended on the package. Avoid Lomotil . Check with your healthcare provider before you use any medicine for diarrhea.  Do not take ibuprofen, or other nonsteroidal anti-inflammatory medicines without checking with your healthcare provider.  If you were given a prescription for medicine here today, be sure to read all of the information (including the package insert) that comes with your prescription.  This will include important information about the medicine, its side effects, and any warnings that you need to know about.  The pharmacist who fills the prescription can provide more information and answer questions you may have about the medicine.  If you have questions or concerns that the pharmacist cannot address, please call or return to the Emergency Department.   Remember that you can always come back to the Emergency Department if you are not able to see your regular provider in the amount of time listed above, if you get any new symptoms, or if there is anything that worries you.

## 2024-07-12 NOTE — ED PROVIDER NOTES
"  Emergency Department Note      History of Present Illness     Chief Complaint   Nausea, Vomiting, & Diarrhea      HPI   Keo Pinto is a 39 year old male currently taking wegovy for weight loss who presents with N/V/D. Patient increased wegovy weekly injection dose yesterday and has had vomiting and diarrhea that onset a few hours after injection. No black or blood in vomit or stool. No known fevers. No recent abx. NO h/o c diff. NO recent travel. No significant abdominal pain. Pt feeling very weak.       Past Medical History     Medical History and Problem List   GERD  Gallstone pancreatitis  Hypertension  Migraines  Obesity    Medications   Xanax  Norvasc  Zestril  Toprol  Wegovy  Valtrex    Surgical History   Adenoidectomy  Endoscopic retrograde cholangiopancreatogram  EGD  Cholecystectomy  PE tubes    Physical Exam     Patient Vitals for the past 24 hrs:   BP Temp Temp src Pulse Resp SpO2 Height Weight   07/12/24 1121 (!) 152/106 98  F (36.7  C) Temporal 98 18 98 % 1.803 m (5' 11\") (!) 187.5 kg (413 lb 5.8 oz)     Physical Exam  VS: Reviewed per above  HENT: Mucous membranes moist  EYES: sclera anicteric  CV: Rate as noted,  regular rhythm.   RESP: Effort normal. Breath sounds are normal bilaterally.  GI: no tenderness/rebound/guarding, not distended.  NEURO: Alert, moving all extremities  MSK: No deformity of the extremities  SKIN: Warm and dry  Diagnostics     Lab Results   Labs Ordered and Resulted from Time of ED Arrival to Time of ED Departure   COMPREHENSIVE METABOLIC PANEL - Abnormal       Result Value    Sodium 139      Potassium 4.3      Carbon Dioxide (CO2) 22      Anion Gap 14      Urea Nitrogen 6.3      Creatinine 0.92      GFR Estimate >90      Calcium 9.3      Chloride 103      Glucose 94      Alkaline Phosphatase 76      AST 54 (*)     ALT 97 (*)     Protein Total 7.6      Albumin 4.2      Bilirubin Total 0.6     CBC WITH PLATELETS AND DIFFERENTIAL    WBC Count 7.1      RBC Count 5.46      " Hemoglobin 16.6      Hematocrit 48.2      MCV 88      MCH 30.4      MCHC 34.4      RDW 13.0      Platelet Count 245      % Neutrophils 79      % Lymphocytes 16      % Monocytes 4      % Eosinophils 1      % Basophils 0      % Immature Granulocytes 0      NRBCs per 100 WBC 0      Absolute Neutrophils 5.6      Absolute Lymphocytes 1.1      Absolute Monocytes 0.3      Absolute Eosinophils 0.1      Absolute Basophils 0.0      Absolute Immature Granulocytes 0.0      Absolute NRBCs 0.0           ED Course      Medications Administered   Medications   ondansetron (ZOFRAN) injection 4 mg (4 mg Intravenous $Given 7/12/24 1343)   sodium chloride 0.9% BOLUS 1,000 mL (1,000 mLs Intravenous $New Bag 7/12/24 1338)   pantoprazole (PROTONIX) IV push injection 40 mg (40 mg Intravenous $Given 7/12/24 1343)     Procedures   Procedures     Discussion of Management   None    ED Course   ED Course as of 07/12/24 1511   Fri Jul 12, 2024   1310 I obtained history and examined the patient as noted above.     1407 I rechecked the patient and explained findings. We discussed plan for discharge and patient is in agreement with plan.            Medical Decision Making / Diagnosis       EDITH Pinto is a 39 year old male who presents to the ER for evaluation of vomiting and diarrhea that started shortly after getting an increased dose of his weekly Wegovy injection yesterday.  Vital signs are reassuring.  Abdominal exam is benign.  Low suspicion for sinister or surgical intra-abdominal pathology meriting advanced imaging.  Basic labs are reassuring as well aside from very minimal transaminase elevation..  He was given IV fluids antiemetics and was feeling improved.  Suspect likely side effect from Wegovy administration.  Plan for discharge with as needed Zofran and recommendation for close primary care follow-up to discuss any adjustment to his weekly Wegovy regimen.  Return precautions discussed prior to discharge.    Disposition   The  patient was discharged.     Diagnosis     ICD-10-CM    1. Vomiting and diarrhea  R11.10     R19.7            Discharge Medications   New Prescriptions    ONDANSETRON (ZOFRAN ODT) 4 MG ODT TAB    Take 1 tablet (4 mg) by mouth every 8 hours as needed for nausea or vomiting       Scribe Disclosure:  I, Danielle Mackenzie, am serving as a scribe at 1:35 PM on 7/12/2024 to document services personally performed by Paulie Hart MD based on my observations and the provider's statements to me.        Paulie Hart MD  07/12/24 1529

## 2024-07-15 ENCOUNTER — PATIENT OUTREACH (OUTPATIENT)
Dept: PEDIATRICS | Facility: CLINIC | Age: 39
End: 2024-07-15
Payer: COMMERCIAL

## 2024-07-15 NOTE — TELEPHONE ENCOUNTER
"Please call patient for TCM outreach.     Seen in ED: vomiting and diarrhea.  -recent  wegovy dose increase    Per AVS  \"Plan for discharge with as needed Zofran and recommendation for close primary care follow-up to discuss any adjustment to his weekly Wegovy regimen.\"        Jemima COLLINS RN on 7/15/2024 at 8:03 AM     "

## 2024-07-16 ENCOUNTER — NURSE TRIAGE (OUTPATIENT)
Dept: PEDIATRICS | Facility: CLINIC | Age: 39
End: 2024-07-16
Payer: COMMERCIAL

## 2024-07-16 NOTE — TELEPHONE ENCOUNTER
Transitions of Care Outreach  Chief Complaint   Patient presents with    Hospital F/U     Vomiting and diarrhea       Most Recent Admission Date: 7/12/2024   Most Recent Admission Diagnosis:      Most Recent Discharge Date: 7/12/2024   Most Recent Discharge Diagnosis: Vomiting and diarrhea - R11.10, R19.7     Transitions of Care Assessment    Discharge Assessment  How are you doing now that you are home?: Doing better  How are your symptoms? (Red Flag symptoms escalate to triage hotline per guidelines): Improved  Do you know how to contact your clinic care team if you have future questions or changes to your health status? : Yes  Does the patient have their discharge instructions? : Yes  Does the patient have questions regarding their discharge instructions? : No  Were you started on any new medications or were there changes to any of your previous medications? : Yes  Does the patient have all of their medications?: Yes  Do you have questions regarding any of your medications? : No  Do you have all of your needed medical supplies or equipment (DME)?  (i.e. oxygen tank, CPAP, cane, etc.): Yes    Follow up Plan     Discharge Follow-Up  Discharge follow up appointment scheduled in alignment with recommended follow up timeframe or Transitions of Risk Category? (Low = within 30 days; Moderate= within 14 days; High= within 7 days): Yes  Discharge Follow Up Appointment Date: 07/17/24  Discharge Follow Up Appointment Scheduled with?: Primary Care Provider    Future Appointments   Date Time Provider Department Center   7/17/2024 10:00 AM Tian Perez MD [facility]   8/29/2024  2:00 PM JENY MARTÍNEZ/LPN [facility]   10/31/2024  2:00 PM JENY MARTÍNEZ/LPN [facility]       Outpatient Plan as outlined on AVS reviewed with patient.    For any urgent concerns, please contact our 24 hour nurse triage line: [phone] ([phone])       JAIME Chris RN, BSN

## 2024-07-16 NOTE — TELEPHONE ENCOUNTER
"S: Constipation  B: Patient states he has not had a bowel movement since seen in ED on 7/12/24    Last Wegovy injection: 7/11/24  Has had zofran x 3 since home from ED.    A: No BM since ED. Usually goes once a day. Passing some gas. Only eating one meal a day since seen in ED. 12-16 prior to ED was vomiting and had diarrhea.    Had small amount of stool that passed yesterday    Patient is experiencing fullness but denies abdominal pain    Feels like he needs to go and then will go to the bathroom and nothing comes out.    Patient takes Metamucil - 2 capsules morning and two at night and has been doing this for a while.    Patient has not tried any other medications for constipation.    R: Scheduled patient for a sooner hospital follow-up with PCP. Advised patient try try second step for constipation which is miralax. Advised to follow directions on the bottle. Discussed what third step would be which is a stimulant laxative.    Discussed increasing water intake. Advised patient needs to increase water intake to 6-8 cups/day.     Patient agreeable to plan.    Reason for Disposition   Last bowel movement (BM) > 4 days ago    Answer Assessment - Initial Assessment Questions  1. STOOL PATTERN OR FREQUENCY: \"How often do you have a bowel movement (BM)?\"  (Normal range: 3 times a day to every 3 days)  \"When was your last BM?\"        Typically daily  2. STRAINING: \"Do you have to strain to have a BM?\"       No  3. RECTAL PAIN: \"Does your rectum hurt when the stool comes out?\" If Yes, ask: \"Do you have hemorrhoids? How bad is the pain?\"  (Scale 1-10; or mild, moderate, severe)      No  4. STOOL COMPOSITION: \"Are the stools hard?\"       Usually sort of loose since has been taking metamucil regularly  5. BLOOD ON STOOLS: \"Has there been any blood on the toilet tissue or on the surface of the BM?\" If Yes, ask: \"When was the last time?\"      No  6. CHRONIC CONSTIPATION: \"Is this a new problem for you?\"  If No, ask: \"How long " "have you had this problem?\" (days, weeks, months)       No  7. CHANGES IN DIET OR HYDRATION: \"Have there been any recent changes in your diet?\" \"How much fluids are you drinking on a daily basis?\"  \"How much have you had to drink today?\"      \"I'm trying to hydrate\". Drinking close to 40 ounces/day  8. MEDICINES: \"Have you been taking any new medicines?\" \"Are you taking any narcotic pain medicines?\" (e.g., Dilaudid, morphine, Percocet, Vicodin)      Wegovy  9. LAXATIVES: \"Have you been using any stool softeners, laxatives, or enemas?\"  If Yes, ask \"What, how often, and when was the last time?\"      No  10. ACTIVITY:  \"How much walking do you do every day?\"  \"Has your activity level decreased in the past week?\"         Exhausted when first was home from ED. Starting to feel more \"normal\" over the last two days. Has been trying to go for walks  11. CAUSE: \"What do you think is causing the constipation?\"         Was in hospital with vomiting/diarrhea on 7/12  12. OTHER SYMPTOMS: \"Do you have any other symptoms?\" (e.g., abdomen pain, bloating, fever, vomiting)        fullness  13. MEDICAL HISTORY: \"Do you have a history of hemorrhoids, rectal fissures, or rectal surgery or rectal abscess?\"          No  14. PREGNANCY: \"Is there any chance you are pregnant?\" \"When was your last menstrual period?\"        N/A    Protocols used: Constipation-A-OH  Elissa BRIONES RN, BSN    "

## 2024-07-17 ENCOUNTER — OFFICE VISIT (OUTPATIENT)
Dept: PEDIATRICS | Facility: CLINIC | Age: 39
End: 2024-07-17
Payer: COMMERCIAL

## 2024-07-17 VITALS
BODY MASS INDEX: 44.1 KG/M2 | SYSTOLIC BLOOD PRESSURE: 130 MMHG | RESPIRATION RATE: 18 BRPM | WEIGHT: 315 LBS | OXYGEN SATURATION: 98 % | HEIGHT: 71 IN | TEMPERATURE: 96.7 F | DIASTOLIC BLOOD PRESSURE: 70 MMHG | HEART RATE: 80 BPM

## 2024-07-17 DIAGNOSIS — R79.89 ELEVATED LIVER FUNCTION TESTS: ICD-10-CM

## 2024-07-17 DIAGNOSIS — E66.813 CLASS 3 OBESITY: Primary | ICD-10-CM

## 2024-07-17 LAB
ALBUMIN SERPL BCG-MCNC: 4.1 G/DL (ref 3.5–5.2)
ALP SERPL-CCNC: 78 U/L (ref 40–150)
ALT SERPL W P-5'-P-CCNC: 104 U/L (ref 0–70)
AST SERPL W P-5'-P-CCNC: 57 U/L (ref 0–45)
BILIRUB DIRECT SERPL-MCNC: <0.2 MG/DL (ref 0–0.3)
BILIRUB SERPL-MCNC: 0.6 MG/DL
PROT SERPL-MCNC: 7.3 G/DL (ref 6.4–8.3)

## 2024-07-17 PROCEDURE — 80076 HEPATIC FUNCTION PANEL: CPT | Performed by: INTERNAL MEDICINE

## 2024-07-17 PROCEDURE — 99214 OFFICE O/P EST MOD 30 MIN: CPT | Performed by: INTERNAL MEDICINE

## 2024-07-17 PROCEDURE — 36415 COLL VENOUS BLD VENIPUNCTURE: CPT | Performed by: INTERNAL MEDICINE

## 2024-07-17 ASSESSMENT — PAIN SCALES - GENERAL: PAINLEVEL: NO PAIN (0)

## 2024-07-17 NOTE — PROGRESS NOTES
ASSESSMENT AND PLAN:      ICD-10-CM    1. Class 3 obesity (H)  E66.01       2. Elevated liver function tests  R79.89 Hepatic function panel     Hepatic function panel        Class III obesity.  He has been having significant weight loss since starting weight of the even with low-dose.  Current plan is for him to wait until all of his GI symptoms are resolved and at least 10 days after his last dose was administered and change his ongoing dosing to 0.5 mg weekly.  If he has return of GI symptoms would consider 0.25 mg dose versus stopping.    Elevated liver function test.  Likely related to his GI symptoms.  Hepatic panel was ordered today.  If his transaminase levels remain elevated would consider getting a liver ultrasound.    Tian Perez MD       Kemar Yao is a 39 year old, presenting for the following health issues:  ER F/U      7/17/2024     9:41 AM   Additional Questions   Roomed by Yesica THAKKAR       ED Followup:    Facility:  [facility]  Date of visit: 7/12/24  Reason for visit: Vomiting and diarrhea, SE of Wegovy  Current Status: has had 1 BM since, no further N&V, no further diarrhea, hydrating well    Maximilian was evaluated and treated in the emergency department last week for symptoms of severe nausea vomiting and diarrhea.  He was found to be dehydrated and required intravenous fluids.  It is felt most likely that his GI symptoms were related to Wegovy.  He has been taking the medication for the last few months to assist with weight loss.  The dose was increased from 0.5 to 1 mg and after a few doses as above GI symptoms started.  He has not taken any doses of Wegovy since the ED visit.  His GI symptoms are improving.  He started taking fiber.  This led to slight constipation which has now improved with a dose of MiraLAX.  We discussed ongoing dosing of fiber supplementation as well as MiraLAX to balance and have good bowel movements.  His nausea is essentially resolved at this point in time.    We  "discussed dosing that would be to treat his obesity.  Even at the lower doses he has had significant success with about a 40 pound weight loss over the last half year.  He hopes to continue with the lower doses.  We discussed timing of restarting the medication.    In the emergency department lab work was drawn which showed elevated AST and ALT.  These previously had been normal.  We plan to recheck lab work today.          Objective    /70 (BP Location: Right arm, Patient Position: Sitting, Cuff Size: Adult Regular)   Pulse 80   Temp (!) 96.7  F (35.9  C) (Tympanic)   Resp 18   Ht 1.803 m (5' 11\")   Wt (!) 186.7 kg (411 lb 11.2 oz)   SpO2 98%   BMI 57.42 kg/m    Body mass index is 57.42 kg/m .  Physical Exam   GEN: No distress  SKIN: No rashes  NECK: Supple. No LAD or TM.  LUNGS: Clear to auscultation bilaterally. No rhonchi, rales, wheezes or retractions.  CV: Regular rate and rhythm.  No murmurs, rubs or gallops. Pulses 2+ radial.  ABD: Obese.  Bowel sounds positive.  Soft nontender nondistended.  EXTR: no edema            Signed Electronically by: Tian Perez MD    "

## 2024-08-29 ENCOUNTER — ALLIED HEALTH/NURSE VISIT (OUTPATIENT)
Dept: PEDIATRICS | Facility: CLINIC | Age: 39
End: 2024-08-29
Payer: COMMERCIAL

## 2024-08-29 VITALS — BODY MASS INDEX: 58.01 KG/M2 | WEIGHT: 315 LBS

## 2024-08-29 DIAGNOSIS — R79.89 ELEVATED LIVER FUNCTION TESTS: ICD-10-CM

## 2024-08-29 PROCEDURE — 36415 COLL VENOUS BLD VENIPUNCTURE: CPT

## 2024-08-29 PROCEDURE — 99207 PR NO CHARGE NURSE ONLY: CPT

## 2024-08-29 PROCEDURE — 80076 HEPATIC FUNCTION PANEL: CPT

## 2024-08-29 NOTE — Clinical Note
FYI: Pt came in for weight check and completed blood work as ordered. Please review if needed. Damaris Vieira, CMA

## 2024-08-29 NOTE — PROGRESS NOTES
Pt came in for weight check and has completed blood work.       Vitals:    08/29/24 1356   Weight: (!) 188.7 kg (415 lb 14.4 oz)      Damaris Vieira CMA

## 2024-08-30 LAB
ALBUMIN SERPL BCG-MCNC: 4 G/DL (ref 3.5–5.2)
ALP SERPL-CCNC: 74 U/L (ref 40–150)
ALT SERPL W P-5'-P-CCNC: 41 U/L (ref 0–70)
AST SERPL W P-5'-P-CCNC: 26 U/L (ref 0–45)
BILIRUB DIRECT SERPL-MCNC: <0.2 MG/DL (ref 0–0.3)
BILIRUB SERPL-MCNC: 0.6 MG/DL
PROT SERPL-MCNC: 7.1 G/DL (ref 6.4–8.3)

## 2024-09-24 ENCOUNTER — TELEPHONE (OUTPATIENT)
Dept: PEDIATRICS | Facility: CLINIC | Age: 39
End: 2024-09-24
Payer: COMMERCIAL

## 2024-09-24 DIAGNOSIS — E66.813 CLASS 3 OBESITY: ICD-10-CM

## 2024-09-24 NOTE — TELEPHONE ENCOUNTER
Prior Authorization Retail Medication Request    Medication/Dose: Semaglutide-Weight Management (WEGOVY) 0.5 MG/0.5ML pen  Diagnosis and ICD code (if different than what is on RX):  [E66.01]   New/renewal/insurance change PA/secondary ins. PA:  Previously Tried and Failed:    Rationale:  [E66.01]     Insurance   Primary: BLUE [employer]US- BL[employer]US [employer]VANTAGE [place]  Insurance ID:  [id number]    Secondary (if applicable): N/A  Insurance ID:  N/A    Pharmacy Information (if different than what is on RX)  [facility]  Phone:  [phone]  Fax: [phone]

## 2024-09-26 NOTE — TELEPHONE ENCOUNTER
Clinic please advise-    Insurance has approved Wegovy and its loading and titration doses through 4/3/2025.     Per call to plan insurance is requiring patient to follow the standard titration dosing guidelines. He has already filled the Wegovy 0.5mg/0.5ml pen in July.  Quantity limits allow 1 fill of each strength per month until patient reaches the maintenance dosing.     Insurance is now requiring him to titrate up the 1mg dosing (Wegovy 1MG/0.5ML auto-injectors).  This dosing will process through insurance.     The next month insurance will require patient to titrate up to the 1.7mg dosing and then to the maintenance dose of 2.4mg.       A quantity limit may be completed but their will need to be documentation why patient cannot titrate up to the 1mg dosing.  If the quantity limit is approved this will only be approved for 1 more fill of 0.5mg dosing.      If provider would like to continue with the quantity limit PA please provide medical documentation for why patient cannot titrate up to the 1mg dosing and route back to PA team to continue with request.     Test claim came back as paid for the 1mg dosing, $0 copay

## 2024-09-26 NOTE — TELEPHONE ENCOUNTER
Prior Authorization Not Needed per Insurance    Medication: Semaglutide-Weight Management (WEGOVY) 0.5 MG/0.5ML pen  Insurance Company: [employer] - Phone [phone] Fax [phone]  Expected CoPay:      Pharmacy Filling the Rx: [id number] - [place], [place] - [address] AT [facility]  Pharmacy Notified:  yes  Patient Notified:  yes- Pharmacy will contact patient when ready to /ship    No additional PA needed, medication has been approved through 4/3/2025

## 2024-09-27 ENCOUNTER — MYC MEDICAL ADVICE (OUTPATIENT)
Dept: PEDIATRICS | Facility: CLINIC | Age: 39
End: 2024-09-27
Payer: COMMERCIAL

## 2024-09-27 NOTE — TELEPHONE ENCOUNTER
Please review pt's MC message & advise.    Notes from 7/17/24:  Class III obesity. He has been having significant weight loss since starting weight of the even with low-dose. Current plan is for him to wait until all of his GI symptoms are resolved and at least 10 days after his last dose was administered and change his ongoing dosing to 0.5 mg weekly. If he has return of GI symptoms would consider 0.25 mg dose versus stopping.     Fermin WOODY  Clinic RN  [facility]

## 2024-10-27 ENCOUNTER — MYC REFILL (OUTPATIENT)
Dept: PEDIATRICS | Facility: CLINIC | Age: 39
End: 2024-10-27
Payer: COMMERCIAL

## 2024-10-27 DIAGNOSIS — E66.813 CLASS 3 OBESITY: ICD-10-CM

## 2024-10-31 ENCOUNTER — ALLIED HEALTH/NURSE VISIT (OUTPATIENT)
Dept: PEDIATRICS | Facility: CLINIC | Age: 39
End: 2024-10-31
Payer: COMMERCIAL

## 2024-10-31 DIAGNOSIS — Z23 ENCOUNTER FOR IMMUNIZATION: Primary | ICD-10-CM

## 2024-10-31 PROCEDURE — 90656 IIV3 VACC NO PRSV 0.5 ML IM: CPT

## 2024-10-31 PROCEDURE — 99207 PR NO CHARGE NURSE ONLY: CPT

## 2024-10-31 PROCEDURE — 90472 IMMUNIZATION ADMIN EACH ADD: CPT

## 2024-10-31 PROCEDURE — 90746 HEPB VACCINE 3 DOSE ADULT IM: CPT

## 2024-10-31 PROCEDURE — 90471 IMMUNIZATION ADMIN: CPT

## 2024-10-31 NOTE — PROGRESS NOTES
Prior to immunization administration, verified patients identity using patient s name and date of birth. Please see Immunization Activity for additional information.     Is the patient's temperature normal (100.5 or less)? Yes     Patient MEETS CRITERIA. PROCEED with vaccine administration.      Patient instructed to remain in clinic for 15 minutes afterwards, and to report any adverse reactions.      Link to Ancillary Visit Immunization Standing Orders SmartSet     Screening performed by Amina Pride CMA on 10/31/2024 at 2:17 PM.        Prior to immunization administration, verified patients identity using patient s name and date of birth. Please see Immunization Activity for additional information.     Is the patient's temperature normal (100.5 or less)? Yes     Patient MEETS CRITERIA. PROCEED with vaccine administration.      Patient instructed to remain in clinic for 15 minutes afterwards, and to report any adverse reactions.      Link to Ancillary Visit Immunization Standing Orders SmartSet     Screening performed by Amina Pride CMA on 10/31/2024 at 2:17 PM.

## 2024-11-05 ENCOUNTER — TELEPHONE (OUTPATIENT)
Dept: PEDIATRICS | Facility: CLINIC | Age: 39
End: 2024-11-05
Payer: COMMERCIAL

## 2024-11-05 NOTE — LETTER
2024    To: [employer] Prior Authorization  Fax: [phone]      Re:  Keo Pinto  :  1985  Insurance   Primary: BLUE PLUS- BLUE PLUS ADVANTAGE [place]  Insurance ID:  [id number]      To Whom It May Concern:    I am the primary care doctor for Keo Pinto.    He is being treated for obesity with semaglutide. We attempted a prior authorization to allow him to remain at the 1 mg dose going forward.    He tried 1.7 mg but developed side effects (prescription sent 2024).     He continues to lose weight with the 1 mg dose.    Please allow him to remain at 1 mg dosing going forward.         Cordially,          Tian Perez MD

## 2024-11-05 NOTE — TELEPHONE ENCOUNTER
Prior Authorization Retail Medication Request     Medication/Dose: Semaglutide-Weight Management (WEGOVY) 1 MG/0.5ML pen   Diagnosis and ICD code (if different than what is on RX):  Class 3 obesity [E66.813]   New/renewal/insurance change PA/secondary ins. PA:  Previously Tried and Failed:  see chart  Rationale: Class 3 obesity [E66.813]      Insurance   Primary: BLUE PLUS- BLUE PLUS ADVANTAGE [place]  Insurance ID:  [id number]     Secondary (if applicable): N/A  Insurance ID:  N/A     Pharmacy Information (if different than what is on RX)  Name:  [facility]  Phone:  [phone]  Fax: [phone]

## 2024-11-06 NOTE — TELEPHONE ENCOUNTER
Central Prior Authorization Team   Phone: [phone]    PA Initiation    Medication: Semaglutide-Weight Management (WEGOVY) 1 MG/0.5ML pen   Insurance Company: [employer] - Phone [phone] Fax [phone]  Pharmacy Filling the Rx: [id number] - [place], [place] - [address] AT [facility]  Filling Pharmacy Phone: [phone]  Filling Pharmacy Fax:    Start Date: 11/6/2024

## 2024-11-07 NOTE — TELEPHONE ENCOUNTER
Gordon Sandoval  [facility]; [id number] minutes ago (11:04 AM)       Hello- This PA has been denied, please see denial rationale.    If provider would like to appeal please review the plan's reasons for denial listed. Please utilize that information to complete letter and provide specific, detailed clinical information/rationale of your patient's health status to address their denial reasons and place letter in patient's chart. The encounter can be routed directly back to me to start appeal.    If done with encounter, please notify the patient and close the encounter.  Thank you,  ARIANA Carvajal Specialist

## 2024-11-07 NOTE — TELEPHONE ENCOUNTER
PRIOR AUTHORIZATION DENIED    Medication: WEGOVY 1 MG/0.5ML SC SOAJ  Insurance Company: [employer] - Phone [phone] Fax [phone]  Denial Date: 11/7/2024  Denial Reason(s): Patient needs to titrate up to the next dose      Appeal Information:   Patient Notified: No

## 2024-11-19 ENCOUNTER — HOSPITAL ENCOUNTER (OUTPATIENT)
Facility: CLINIC | Age: 39
Setting detail: OBSERVATION
Discharge: HOME | End: 2024-11-20
Attending: STUDENT IN AN ORGANIZED HEALTH CARE EDUCATION/TRAINING PROGRAM | Admitting: PSYCHIATRY & NEUROLOGY
Payer: COMMERCIAL

## 2024-11-19 DIAGNOSIS — R45.851 SUICIDAL IDEATION: ICD-10-CM

## 2024-11-19 DIAGNOSIS — F43.23 ADJUSTMENT DISORDER WITH MIXED ANXIETY AND DEPRESSED MOOD: ICD-10-CM

## 2024-11-19 DIAGNOSIS — F41.1 GAD (GENERALIZED ANXIETY DISORDER): ICD-10-CM

## 2024-11-19 PROBLEM — F41.9 ANXIETY DISORDER, UNSPECIFIED: Status: ACTIVE | Noted: 2024-11-19

## 2024-11-19 PROBLEM — F32.A DEPRESSION, UNSPECIFIED: Status: ACTIVE | Noted: 2024-11-19

## 2024-11-19 PROCEDURE — G0378 HOSPITAL OBSERVATION PER HR: HCPCS

## 2024-11-19 PROCEDURE — 99285 EMERGENCY DEPT VISIT HI MDM: CPT

## 2024-11-19 RX ORDER — AMLODIPINE BESYLATE 5 MG/1
5 TABLET ORAL DAILY
Status: DISCONTINUED | OUTPATIENT
Start: 2024-11-20 | End: 2024-11-20 | Stop reason: HOSPADM

## 2024-11-19 RX ORDER — LISINOPRIL 10 MG/1
40 TABLET ORAL DAILY
Status: DISCONTINUED | OUTPATIENT
Start: 2024-11-20 | End: 2024-11-20 | Stop reason: HOSPADM

## 2024-11-19 RX ORDER — HYDROXYZINE HYDROCHLORIDE 50 MG/1
50 TABLET, FILM COATED ORAL EVERY 6 HOURS PRN
Status: DISCONTINUED | OUTPATIENT
Start: 2024-11-19 | End: 2024-11-20 | Stop reason: HOSPADM

## 2024-11-19 RX ORDER — TRAZODONE HYDROCHLORIDE 50 MG/1
50 TABLET, FILM COATED ORAL
Status: DISCONTINUED | OUTPATIENT
Start: 2024-11-19 | End: 2024-11-20 | Stop reason: HOSPADM

## 2024-11-19 RX ORDER — METOPROLOL SUCCINATE 50 MG/1
200 TABLET, EXTENDED RELEASE ORAL DAILY
Status: DISCONTINUED | OUTPATIENT
Start: 2024-11-20 | End: 2024-11-20 | Stop reason: HOSPADM

## 2024-11-19 RX ORDER — ONDANSETRON 4 MG/1
4 TABLET, ORALLY DISINTEGRATING ORAL EVERY 8 HOURS PRN
Status: DISCONTINUED | OUTPATIENT
Start: 2024-11-19 | End: 2024-11-20 | Stop reason: HOSPADM

## 2024-11-19 RX ORDER — IBUPROFEN 400 MG/1
400 TABLET, FILM COATED ORAL EVERY 6 HOURS PRN
Status: DISCONTINUED | OUTPATIENT
Start: 2024-11-19 | End: 2024-11-20 | Stop reason: HOSPADM

## 2024-11-19 ASSESSMENT — ACTIVITIES OF DAILY LIVING (ADL)
ADLS_ACUITY_SCORE: 0

## 2024-11-19 NOTE — ED PROVIDER NOTES
"  Emergency Department Note      History of Present Illness     Chief Complaint   Depression and Anxiety    HPI   Keo Pinto is a 39 year old male with a history of depression and anxiety who presents for a mental health evaluation. The patient states that he has been under a lot of family stress recently. His mother is in a TCU facility and his father had been drinking more and is now in rehab for alcoholism. He lives at his parents house and now has stress about his living situation as he just learned from his brother that his parents might be selling their house if they move to an assisted living facility. Last night, he called the [employer] line and spoke with a crisis worker for many hours. He learned about the [facility] unit. He also met with his therapist today. Reports that he has PRN alprazolam and he did take one but felt relief when thinking about taking the whole bottle or enacting on his suicidal thoughts. He did have suicidal ideation when he was 14 years old and was evaluated but did not undergo inpatient psychiatric treatment. He denies drug and alcohol use.     Independent Historian   None    Review of External Notes   None    Past Medical History     Medical History and Problem List   GERD  Gallstone pancreatitis  Hypertension  Migraine  Obesity   Anxiety   Insomnia     Medications   Alprazolam  Wegovy   Amlodipine  Lisinopril  Metoprolol    Surgical History   Adenoidectomy   ERCP  Cholecystectomy     Physical Exam     Patient Vitals for the past 24 hrs:   BP Temp Temp src Pulse Resp SpO2 Height Weight   11/19/24 1557 (!) 140/94 97  F (36.1  C) Oral 90 18 98 % 1.803 m (5' 11\") (!) 172.4 kg (380 lb)     Physical Exam  General:  Alert, interactive  Cardiovascular:  Normal rate  Lungs:  No respiratory distress, no accessory muscle use  Abdominal: No distension   Neuro:  Moving all 4 extremities  MSK: No gross deformities  Skin:  Warm, dry  Psych: Depressed mood. Suicidal ideations. Good insight. " Participatory with exam      Diagnostics     Lab Results   Labs Ordered and Resulted from Time of ED Arrival to Time of ED Departure - No data to display    Imaging   No orders to display     Independent Interpretation   None    ED Course      Medications Administered   Medications - No data to display    Procedures   Procedures     Discussion of Management   None    ED Course   ED Course as of 11/19/24 1815 Tue Nov 19, 2024 1716 I obtained history and examined the patient as noted above.        Additional Documentation  Social Determinants of Health: Stress/Adjustment Disorders     Medical Decision Making / Diagnosis     CMS Diagnoses: None    MIPS       None    MDM   Keo Pinto is a 39 year old male here for depression, anxiety, suicidal thoughts that he describes as more pervasive lately.  Significant life stressors.  Has been working with a therapist extensively.  Patient has good insight into his mental health, is descriptive regarding his symptoms and triggers.  I agree he is experiencing a mental health crisis.  He learned about [facility] prior to arrival at our ER and I agreed this would be a good fit for him.  At this time he is voluntary and I will not place a hold as he is actively here seeking help.  No indication for lab testing today.  Will transfer to [facility]path able.  Patient is amenable to plan.    Is not on Wegov[facility] for several weeks, would not restart that empath.    Disposition   The patient was transferred to [facility].     Diagnosis     ICD-10-CM    1. Suicidal ideation  R45.851          Scribe Disclosure:  I, Mayelin Banegas, am serving as a scribe at 5:20 PM on 11/19/2024 to document services personally performed by Risa Carter DO based on my observations and the provider's statements to me.        Risa Carter DO  11/19/24 1815

## 2024-11-19 NOTE — ED TRIAGE NOTES
Pt c/o depression for 6 months and anxiety over the last week, SI w/o specific plan, multiple family and work stressors, no HI, calm cooperative, ABCD intact.       Triage Assessment (Adult)       Row Name 11/19/24 6323          Triage Assessment    Airway WDL WDL        Respiratory WDL    Respiratory WDL WDL        Skin Circulation/Temperature WDL    Skin Circulation/Temperature WDL WDL        Cardiac WDL    Cardiac WDL WDL        Peripheral/Neurovascular WDL    Peripheral Neurovascular WDL WDL        Cognitive/Neuro/Behavioral WDL    Cognitive/Neuro/Behavioral WDL WDL

## 2024-11-19 NOTE — ED NOTES
[facility]  ED to [facility] Checklist:      Goal for [facility]: Depression management and Anxiety management    Current Behavior: Calm and Cooperative    Safety Concerns: Suicidal, no plan    Legal Hold Status: Voluntary    Medically Cleared by ED provider: Yes    Patient Therapeutically Searched: N/A    Belongings: Remain with patient    Independent Ambulation at Baseline: Yes/No: Yes    Participates in Care/Conversation: Yes/No: Yes    Patient Informed about [facility]: Yes/No: Yes    DEC: Ordered and pending    Patient Ready to be Transferred to [facility]? Yes/No: Yes

## 2024-11-20 ENCOUNTER — VIRTUAL VISIT (OUTPATIENT)
Dept: PSYCHOLOGY | Facility: CLINIC | Age: 39
End: 2024-11-20

## 2024-11-20 VITALS
HEART RATE: 66 BPM | RESPIRATION RATE: 16 BRPM | OXYGEN SATURATION: 98 % | HEIGHT: 71 IN | BODY MASS INDEX: 44.1 KG/M2 | DIASTOLIC BLOOD PRESSURE: 87 MMHG | SYSTOLIC BLOOD PRESSURE: 133 MMHG | TEMPERATURE: 97.7 F | WEIGHT: 315 LBS

## 2024-11-20 DIAGNOSIS — Z78.9 KNOWN HEALTH PROBLEMS: NONE: Primary | ICD-10-CM

## 2024-11-20 PROCEDURE — 250N000013 HC RX MED GY IP 250 OP 250 PS 637: Performed by: NURSE PRACTITIONER

## 2024-11-20 PROCEDURE — 99207 PR NO BILLABLE SERVICE THIS VISIT: CPT | Mod: 93

## 2024-11-20 PROCEDURE — G0378 HOSPITAL OBSERVATION PER HR: HCPCS

## 2024-11-20 RX ORDER — TRAZODONE HYDROCHLORIDE 50 MG/1
50-100 TABLET, FILM COATED ORAL AT BEDTIME
Qty: 30 TABLET | Refills: 0 | Status: SHIPPED | OUTPATIENT
Start: 2024-11-20

## 2024-11-20 RX ORDER — ESCITALOPRAM OXALATE 10 MG/1
TABLET ORAL
Qty: 30 TABLET | Refills: 0 | Status: SHIPPED | OUTPATIENT
Start: 2024-11-20

## 2024-11-20 RX ADMIN — TRAZODONE HYDROCHLORIDE 50 MG: 50 TABLET ORAL at 03:19

## 2024-11-20 RX ADMIN — AMLODIPINE BESYLATE 5 MG: 5 TABLET ORAL at 03:19

## 2024-11-20 RX ADMIN — LISINOPRIL 40 MG: 10 TABLET ORAL at 03:17

## 2024-11-20 RX ADMIN — METOPROLOL SUCCINATE 200 MG: 50 TABLET, EXTENDED RELEASE ORAL at 03:18

## 2024-11-20 RX ADMIN — IBUPROFEN 400 MG: 400 TABLET ORAL at 03:19

## 2024-11-20 ASSESSMENT — COLUMBIA-SUICIDE SEVERITY RATING SCALE - C-SSRS
TOTAL  NUMBER OF ABORTED OR SELF INTERRUPTED ATTEMPTS SINCE LAST CONTACT: NO
2. HAVE YOU ACTUALLY HAD ANY THOUGHTS OF KILLING YOURSELF?: YES
TOTAL  NUMBER OF ABORTED OR SELF INTERRUPTED ATTEMPTS SINCE LAST CONTACT: NO
5. HAVE YOU STARTED TO WORK OUT OR WORKED OUT THE DETAILS OF HOW TO KILL YOURSELF? DO YOU INTEND TO CARRY OUT THIS PLAN?: NO
ATTEMPT SINCE LAST CONTACT: NO
6. HAVE YOU EVER DONE ANYTHING, STARTED TO DO ANYTHING, OR PREPARED TO DO ANYTHING TO END YOUR LIFE?: NO
1. SINCE LAST CONTACT, HAVE YOU WISHED YOU WERE DEAD OR WISHED YOU COULD GO TO SLEEP AND NOT WAKE UP?: NO
ATTEMPT SINCE LAST CONTACT: NO
6. HAVE YOU EVER DONE ANYTHING, STARTED TO DO ANYTHING, OR PREPARED TO DO ANYTHING TO END YOUR LIFE?: NO
1. SINCE LAST CONTACT, HAVE YOU WISHED YOU WERE DEAD OR WISHED YOU COULD GO TO SLEEP AND NOT WAKE UP?: NO
SUICIDE, SINCE LAST CONTACT: NO
TOTAL  NUMBER OF INTERRUPTED ATTEMPTS SINCE LAST CONTACT: NO
REASONS FOR IDEATION SINCE LAST CONTACT: DOES NOT APPLY
2. HAVE YOU ACTUALLY HAD ANY THOUGHTS OF KILLING YOURSELF?: NO
SUICIDE, SINCE LAST CONTACT: NO
TOTAL  NUMBER OF INTERRUPTED ATTEMPTS SINCE LAST CONTACT: NO

## 2024-11-20 ASSESSMENT — ACTIVITIES OF DAILY LIVING (ADL)
ADLS_ACUITY_SCORE: 0

## 2024-11-20 NOTE — ED PROVIDER NOTES
[facility] Unit - Psychiatric Observation Discharge Summary  [facility]  Discharge Date: 11/20/2024    Keo Pinto MRN: [id number]   Age: 39 year old YOB: 1985     Brief HPI & Initial ED Course     Chief Complaint   Patient presents with    Depression    Anxiety     HPI  Keo Pinto is a 39 year old male with a past history notable for anxiety, depression, hypertension, and obesity.  Patient presented to the emergency department for evaluation of heightened anxiety and depressive symptoms, accompanied by suicidal ideation in the context of heightened psychosocial stressors after finding out his parents may be selling their house, where patient resides.  Patient was medically evaluated in the emergency department and determined to be medically stable for transfer to [facility] for further psychiatric assessment.  Patient is nearing 23 hours in emergency care. On reassessment today, patient reports he spoke with his father via telephone earlier today. Reports feeling some relief in terms of his housing situation, understanding that he may have some time to look for work and identify a new place to live. He talks about a couple of friends who live in [place], and has considered moving out there. His parents would prefer he remain in the area. He plans to continue these discussions with his outpatient therapist. Overnight, endorses improved sleep, obtaining more sleep than he had at home the past several nights. He is agreeable to initiate an selective serotonin reuptake inhibitor to target symptoms of anxiety, and we discuss escitalopram as an option. He is agreeable to continue trazodone upon return home. Will take more time to think about the option of PHP/IOP. Pt has experienced some passive thoughts of death, but today denies any plan or intent to act on these thoughts. There is no evidence of homicidal thinking, psychosis, or nicolle. Feels ready to return home, indicating adequate  "support in place.       Physical Examination   BP: 133/87  Pulse: 66  Temp: 97.7  F (36.5  C)  Resp: 16  Height: 180.3 cm (5' 11\")  Weight: (!) 177.4 kg (391 lb 1.6 oz)  SpO2: 98 %    Physical Exam  General: Appears stated age.   Neuro: Alert and fully oriented. Extremities appear to demonstrate normal strength on visual inspection.   Integumentary/Skin: no rash visualized, normal color    Psychiatric Examination   Appearance: awake, alert, appeared as age stated, casually dressed, and slightly unkempt  Attitude:  cooperative  Eye Contact:  good  Mood:  anxious  Affect:  mood congruent and intensity is normal  Speech:  clear, coherent and normal prosody  Psychomotor Behavior:  no evidence of tardive dyskinesia, dystonia, or tics  Thought Process:  linear and goal oriented  Associations:  no loose associations  Thought Content:  no evidence of suicidal ideation or homicidal ideation and no evidence of psychotic thought  Insight:  fair  Judgement:  intact  Oriented to:  time, person, and place  Attention Span and Concentration:  fair  Recent and Remote Memory: intact  Language: able to name/identify objects without impairment  Fund of Knowledge: intact with awareness of current and past events    Results     ED Course as of 11/20/24 1438   Tue Nov 19, 2024   1716 I obtained history and examined the patient as noted above.        Labs Ordered and Resulted from Time of ED Arrival to Time of ED Departure - No data to display    Observation Course   The patient was found to have a psychiatric condition that would benefit from an observation stay in the emergency department for further psychiatric stabilization and/or coordination of a safe disposition. The plan upon observation admission included serial assessments of psychiatric condition, potential administration of medications if indicated, further disposition pending the patient's psychiatric course during the monitoring period.     Serial assessments of the patient's " psychiatric condition were performed. Nursing notes were reviewed. During the observation period, the patient did not require medications for agitation, and did not require restraints/seclusion for patient and/or provider safety.     After a period of working with the treatment team on the [facility] unit, the patient's mental state improved to allow a safe transition to outpatient care. After counseling on the diagnosis, work-up, and treatment plan, the patient was discharged. Close follow-up with a psychiatrist and/or therapist was recommended and community psychiatric resources were provided. Patient is to return to the ED if any urgent or potentially life-threatening concerns.     Discharge Diagnoses:   Final diagnoses:   Suicidal ideation   XAVI (generalized anxiety disorder)   Adjustment disorder with mixed anxiety and depressed mood       Treatment Plan:  - Initiate Lexapro 5 mg daily for 4 days, then increase to 10 mg daily thereafter. Target symptoms and potential side effects discussed.   - Prescribe trazodone  mg nightly for sleep  - Continue to followup with established outpatient psychotherapist. Pt provided with information for PHP/IOP, which he will discuss further with his outpatient psychotherapist  - Pt to be provided with outpatient psychiatrist medication management follow-up.   - Patient to be discharged home with safety plan in place.     Plan of care discussed with RN and LMHP.       At the time of discharge, the patient's acute suicide risk was determined to be low due to the following factors: Reduction in the intensity of mood/anxiety symptoms that preceded the admission, denial of suicidal thoughts, denies feeling helpless or hopeless, not currently under the influence of alcohol or illicit substances, denies experiencing command hallucinations, no immediate access to firearms. The patient's acute risk could be higher if noncompliant with their treatment plan, medications, follow-up  appointments or using illicit substances or alcohol. Protective factors include: social supports, stable housing    I spent more than 30 minutes on discharge day activities.    --  KAROLINA [facility]anc[facility] EMERGENCY DEPT  [facility] Unit      Joseph Demarco CNP  11/20/24 9276

## 2024-11-20 NOTE — PLAN OF CARE
"Keo Pinto  November 19, 2024  Plan of Care Hand-off Note     Patient Care Path: observation    Plan for Care:     Pt presents to the ED for psychiatric evaluation of worsening anxiety, depression and SI at [facility]. Pt endorses feelings of anger, rejection, and hopelessness, he also struggles w/ insomnia/disturbed sleep. Pt's been having \"dark thoughts and fantasies\" of dying \"by pills or cutting\". Pt has not made any plans to  end his life and doesn't want to die, but a voice in his head tells him \"I could do it\".  Pt does not endorse auditory or visual hallucinations at this time.     Pt is interested in medication management and learning about more intensive outpatient mental health supports.      Patient will remain on [facility] unit under observation for continued monitoring, treatment and therapeutic intervention of mental health symptoms. Observation at [facility] could help mitigate the need for a more restrictive level of care in an inpatient setting.    Identified Goals and Safety Issues: Medication management, Referrals for IOP, and Safety Planning    Overview:  Pt does not want [facility] to contact emergency contacts Luis Pinto (brother) or Jonny Pinto (dad) due to ongoing conflict. Pt is willing to sign BRYON for his outpatient therapist tomorrow.            Legal Status: Legal Status at Admission: Voluntary/Patient has signed consent for treatment    Psychiatry Consult: Yes       Updated Joseph Demarco CNP and Becca Alexander RN regarding plan of care.           MARCELLE Bravo       "

## 2024-11-20 NOTE — DISCHARGE INSTRUCTIONS
Scheduled Appointment    Type: Telepsychiatry  Date: Friday, 11/22/2024    Time: 1:00 pm - 2:00 pm  Provider: Jonathan Rodríguez  MSN  CNP,PMHNP,RN  Location: [facility], [address] [place], [place] [address]dilia Hernandez, [place] [address]  Phone: [phone]  Patient instructions: All Intake appointments will be conducted via telehealth and must have access to video through smart phone or laptop/pc/tablet. You will be contacted by our office to set up the virtual meeting. If you have questions, please contact our office at [phone].

## 2024-11-20 NOTE — PROGRESS NOTES
Navigator Phone Call     PATIENT'S NAME: Keo Pinto  MRN:                      [id number]  :           1985    DATE OF SERVICE: 24    Was the patient reached? Yes    Does that patient have MyChart? Yes    Was the insurance grid consulted? Yes  Mixed insurance, in network, out of network: in network    Appointment scheduled with [facility]: Yes  Date of appointment:  at 11:30am with Eileen ANAYA    If still interested in programmatic care was a crisis therapy [facility] appointment offered for bridging until programmatic care starts? No    Additional Notes: Pt has questions about PHP - agreed to meet with provider to have questions answered and potential programming.

## 2024-11-20 NOTE — ED NOTES
Pt sleeping in recliner until 0300; woke up and came up to nurses station and requested his BP meds. Pt also reported of a headache and requested Advil and trazodone. /82 HR 69. Pt able to go back to sleep after taking meds. Currently up in chair with a drink in his hand.
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
## 2024-11-20 NOTE — PROGRESS NOTES
"39 year old male received from ED for suicidal ideation. Pt reports an increase in stress two weeks ago when his mother fell in the house and went to a TCU. Pt reports his father is an alcoholic and a hoarder and they do not have a good relationship. Pt reports he was under the assumption that his mother would come back to the house from the TCU but was recently told by his brother that his parents would be selling the house and moving into an assisted living. Pt reports this caused him to panic as he currently does not have a job and will not have a place to live. Pt reports he will not be able to go live with his brother and currently feels he can not rely on his brother for help. Pt reports he has been \"spiraling\" and has noted \"dooming thoughts.\" Pt states he has had passive suicidal thoughts of overdosing on the alprazolam he is currently prescribed. Pt denies any intent. Pt reports he called a crisis line last night and saw his therapist today and they recommended he go to the hospital for a higher level of care. Pt denies any SA's. He reports he has a therapist he has been seeing for the past five years. Pt reports at age [age] or [age] he was evaluated for suicidal ideations and was prescribed three medications he does not remember but states he felt worse on the \"cocktail.\"     Nursing and risk assessments completed.  Assessments reviewed with [facility] and physician. Video monitoring in progress, patient informed.  Admission information reviewed with patient. Patient given a tour of [facility] and instructions on using the facility. Questions regarding [facility] addressed. Pt search completed and belongings inventoried.  "

## 2024-11-20 NOTE — PROGRESS NOTES
Patient agreeable to discharge plan. Discharge instructions reviewed with patient including follow-up care plan. Medications: Lexapro and Trazodone filled at [facility] and sent with patient at time of discharge. Reviewed safety plan and outpatient resources. Denies SI and HI. All belongings that were brought into the hospital have been returned to patient. Escorted off the unit at 1706 accompanied by [facility] staff. Discharged to home via self.
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
## 2024-11-20 NOTE — ED NOTES
Pt pleasant and cooperative. Denied SI or physical complaints. Stated he is feeling calm and is interested in looking into PHP after talking with his therapist. Will continue to monitor mood, safety and behavior.

## 2024-11-20 NOTE — ED PROVIDER NOTES
[facility] - Initial Psychiatric Observation Note  [facility] Emergency Department  Observation Initiation Date: Nov 19, 2024    Keo Pinto MRN: [id number]   Age: 39 year old YOB: 1985     History     Chief Complaint   Patient presents with    Depression    Anxiety     HPI  Keo Pinto is a 39 year old male with a past history notable for anxiety, depression, hypertension, and obesity.  Patient presented to the emergency department for evaluation of heightened anxiety and depressive symptoms, accompanied by suicidal ideation in the context of heightened psychosocial stressors after finding out his parents may be selling their house, where patient resides.  Patient was medically evaluated in the emergency department and determined to be medically stable for transfer to [facility] for further psychiatric assessment.  Patient is nearing 6 hours in emergency care.  Here at [facility], patient describes recent heightened stressors related to his parents health issues leading to concern that he may be losing his housing after they move into an assisted living facility.  Patient reports that his mother has Parkinson's disease, and recently fell and was admitted to a TCU for rehab.  Patient's father is noted to be an alcoholic and a hoarder and apparently checked himself into a rehab facility.  Patient recently found out from his brother that their parents may be selling their home and moving into an assisted living facility.  This led to patient experiencing heightened symptoms of anxiety and panic as he does not work and is worried about having a place to live.  Patient has been told by his brother that he may not come live with his brother.  These heightened stressors have led to increasing difficulty sleeping at night, with the patient reporting sleeping only about 8 hours in total over the last 3 nights.  He is experiencing difficulty falling and staying asleep.  He is prescribed alprazolam as needed,  and ended up taking a dose due to his heightened anxiety symptoms, which then led to him experiencing thoughts of taking the entire bottle in an attempt to end his life.  Patient did not act on these thoughts, and spoke with his therapist about these thoughts, who encouraged him to present to the emergency department for an assessment.  Patient describes a history of anxiety symptoms, as well as issues with depression and suicidal thinking as a teenager.  Recalls being prescribed 3 medications as a teenager, which made him feel quite sedated and out of it.  This experience, along with information he has been given from a friend, leading him to experience hesitation regarding an SSRI trial.  He has done well with alprazolam and uses it only sparingly.  He was prescribed Lexapro about 1.5 years ago for anxiety symptoms, but did not end up taking it.  He would like to think further about whether he would consider an SSRI, and patient was encouraged to continue this discussion with the provider with whom he meets tomorrow.  Patient is requesting medication to help with insomnia this evening.  He is agreeable to remain on observation overnight.  He feels safe here, without any current plan or intent to harm himself.  There is no evidence for homicidal thinking, psychosis, or nicolle.    Past Medical History  Past Medical History:   Diagnosis Date    Depression, unspecified 11/19/2024    Esophageal reflux     Gallstone pancreatitis 2/28/2019    Hypertension     Migraine headache     Obese      Past Surgical History:   Procedure Laterality Date    ADENOIDECTOMY      ENDOSCOPIC RETROGRADE CHOLANGIOPANCREATOGRAM N/A 2/28/2019    Procedure: ENDOSCOPIC RETROGRADE CHOLANGIOPANCREATOGRAM with Sphincterotomy and Stone Extraction.;  Surgeon: Amina Hernandez MD;  Location:  OR    ESOPHAGOSCOPY, GASTROSCOPY, DUODENOSCOPY (EGD), COMBINED N/A 2/28/2019    Procedure: COMBINED ENDOSCOPIC ULTRASOUND, Endoscopic Retrograde  "Cholangiopancreatoscopy;  Surgeon: Amina Hernandez MD;  Location: [facility] OR    LAPAROSCOPIC CHOLECYSTECTOMY N/A 3/3/2019    Procedure: LAPAROSCOPIC CHOLECYSTECTOMY;  Surgeon: Leland Crowder MD;  Location: [facility] OR    PE TUBES      As a young child     ALPRAZolam (XANAX) 0.5 MG tablet  amLODIPine (NORVASC) 5 MG tablet  lisinopril (ZESTRIL) 40 MG tablet  metoprolol succinate ER (TOPROL XL) 200 MG 24 hr tablet  ondansetron (ZOFRAN ODT) 4 MG ODT tab  Semaglutide-Weight Management (WEGOVY) 1 MG/0.5ML pen  ALPRAZolam (XANAX) 0.5 MG tablet      No Known Allergies  Family History  Family History   Problem Relation Age of Onset    Neurologic Disorder Mother         Parkinsons, Migraine HA's    Hypertension Father     Alcohol/Drug Father     Heart Disease Maternal Grandfather         MI age 52    Heart Disease Paternal Grandfather         MI age 70's     Social History   Social History     Tobacco Use    Smoking status: Never     Passive exposure: Never    Smokeless tobacco: Never   Vaping Use    Vaping status: Never Used   Substance Use Topics    Alcohol use: Not Currently     Comment: A beer or two a couple of times a month    Drug use: No          Review of Systems  A medically appropriate review of systems was performed with pertinent positives and negatives noted in the HPI, and all other systems negative.    Physical Examination   BP: (!) 140/94  Pulse: 90  Temp: 97  F (36.1  C)  Resp: 18  Height: 180.3 cm (5' 11\")  Weight: (!) 172.4 kg (380 lb)  SpO2: 98 %    Physical Exam  General: Appears stated age.   Neuro: Alert and fully oriented. Extremities appear to demonstrate normal strength on visual inspection.   Integumentary/Skin: no rash visualized, normal color    Psychiatric Examination   Appearance: awake, alert, appeared as age stated, casually dressed, and slightly unkempt  Attitude:  cooperative  Eye Contact:  fair  Mood:  anxious  Affect:  mood congruent and intensity is normal  Speech:  clear, coherent " and normal prosody  Psychomotor Behavior:  no evidence of tardive dyskinesia, dystonia, or tics  Thought Process:  linear  Associations:  no loose associations  Thought Content:  no evidence of psychotic thought, passive suicidal ideation present, no auditory hallucinations present, and no visual hallucinations present, and no homicidal thinking  Insight:  fair  Judgement:  fair  Oriented to:  time, person, and place  Attention Span and Concentration:  fair  Recent and Remote Memory:  intact  Language: able to name/identify objects without impairment  Fund of Knowledge: intact with awareness of current and past events    ED Course     ED Course as of 11/19/24 2158 Tue Nov 19, 2024 1716 I obtained history and examined the patient as noted above.        Labs Ordered and Resulted from Time of ED Arrival to Time of ED Departure - No data to display    Assessments & Plan (with Medical Decision Making)   Patient presenting with symptoms of heightened anxiety and depression with passive suicidal thinking in the context of psychosocial stressors and worry about losing his housing. Nursing notes reviewed noting no acute issues.     I have reviewed the assessment completed by the [facility].     During the observation period, the patient did not require medications for agitation, and did not require restraints/seclusion for patient and/or provider safety.     The patient was found to have a psychiatric condition that would benefit from an observation stay in the emergency department for further psychiatric stabilization and/or coordination of a safe disposition. The observation plan includes serial assessments of psychiatric condition, potential administration of medications if indicated, further disposition pending the patient's psychiatric course during the monitoring period.     Preliminary diagnosis:    ICD-10-CM    1. Suicidal ideation  R45.851       2. XAVI (generalized anxiety disorder)  F41.1       3. Adjustment disorder  with mixed anxiety and depressed mood  F43.23            Treatment Plan:  -Patient provided education on SSRIs, including target symptoms and side effects.  Patient hesitant to consider SSRI class due to advice from a friend as well as experience with psychotropic medications as a teenager.  Patient was prescribed Lexapro in 2023, and did not end up taking.  Continue discussion tomorrow regarding SSRI trial for treatment of baseline anxiety symptoms with acute exacerbation.  -Will order trazodone 50 mg nightly as needed for insomnia with repeat available, as well as hydroxyzine 50 mg every 6 hours as needed for acute anxiety.  -PDMP reviewed, alprazolam 0.5 mg last filled 4/27/2024 for 30 tablets.  Will hold for now and encourage patient to trial hydroxyzine for acute anxiety symptoms.  -McConnell to observation status, reassess tomorrow    --  Joseph Demarco CNP   [facility] EMERGENCY DEPT  [facility] Unit      Joseph Demarco CNP  11/19/24 3109

## 2024-11-21 ENCOUNTER — TELEPHONE (OUTPATIENT)
Dept: BEHAVIORAL HEALTH | Facility: CLINIC | Age: 39
End: 2024-11-21
Payer: COMMERCIAL

## 2024-11-21 NOTE — TELEPHONE ENCOUNTER
Provider connected with pt this morning for a scheduled diagnostic assessment for programmatic care after pt's recent discharge from [facility]. Provider was informed prior that pt had many questions re: programming and wanted those addressed before committing to programming.  Pt had several questions prepared for provider as pt was not familiar with PHP or IOP.  Pt was not aware of the time commitment for these programs & also shared plans to relocate to [place] or [place] in the near future for work opportunities.  Provider did not complete a DA during this visit for this reason.  No safety concerns at this time. Pt denies SI since prior to [facility] visit. Pt plans to continue weekly sessions with his OP therapist and is meeting with a new psychiatric NP tomorrow, 11/22/2024.    Eileen QUEVEDO, LICSW

## 2024-11-27 ENCOUNTER — MYC MEDICAL ADVICE (OUTPATIENT)
Dept: PEDIATRICS | Facility: CLINIC | Age: 39
End: 2024-11-27
Payer: COMMERCIAL

## 2024-11-27 NOTE — TELEPHONE ENCOUNTER
See the [facility] message from pt. I see that  signed a appeal letter on 11/12/24 & it was faxed to "[employer]. We are awaiting response from them.    Fermin WOODY  Clinic RN  [facility]

## 2024-12-15 ENCOUNTER — NURSE TRIAGE (OUTPATIENT)
Dept: NURSING | Facility: CLINIC | Age: 39
End: 2024-12-15
Payer: COMMERCIAL

## 2024-12-15 NOTE — TELEPHONE ENCOUNTER
Patient states that he believes he doubled up dosage on BP medications. Patient transferred to Poison Control Mitzi Madrid RN on 12/15/2024 at 7:11 AM    Reason for Disposition   MORE THAN A DOUBLE DOSE of a prescription or over-the-counter (OTC) drug    Protocols used: Medication Question Call-A-

## 2024-12-26 ENCOUNTER — HOSPITAL ENCOUNTER (EMERGENCY)
Facility: CLINIC | Age: 39
Discharge: HOME | End: 2024-12-26
Attending: EMERGENCY MEDICINE
Payer: COMMERCIAL

## 2024-12-26 ENCOUNTER — APPOINTMENT (OUTPATIENT)
Dept: GENERAL RADIOLOGY | Facility: CLINIC | Age: 39
End: 2024-12-26
Attending: EMERGENCY MEDICINE
Payer: COMMERCIAL

## 2024-12-26 VITALS
HEIGHT: 71 IN | SYSTOLIC BLOOD PRESSURE: 170 MMHG | TEMPERATURE: 97.3 F | HEART RATE: 84 BPM | BODY MASS INDEX: 44.1 KG/M2 | OXYGEN SATURATION: 99 % | DIASTOLIC BLOOD PRESSURE: 109 MMHG | RESPIRATION RATE: 18 BRPM | WEIGHT: 315 LBS

## 2024-12-26 DIAGNOSIS — R07.89 ATYPICAL CHEST PAIN: ICD-10-CM

## 2024-12-26 LAB
ANION GAP SERPL CALCULATED.3IONS-SCNC: 12 MMOL/L (ref 7–15)
ATRIAL RATE - MUSE: 77 BPM
BASOPHILS # BLD AUTO: 0 10E3/UL (ref 0–0.2)
BASOPHILS NFR BLD AUTO: 0 %
BUN SERPL-MCNC: 9.3 MG/DL (ref 6–20)
CALCIUM SERPL-MCNC: 9 MG/DL (ref 8.8–10.4)
CHLORIDE SERPL-SCNC: 100 MMOL/L (ref 98–107)
CREAT SERPL-MCNC: 0.93 MG/DL (ref 0.67–1.17)
DIASTOLIC BLOOD PRESSURE - MUSE: NORMAL MMHG
EGFRCR SERPLBLD CKD-EPI 2021: >90 ML/MIN/1.73M2
EOSINOPHIL # BLD AUTO: 0.1 10E3/UL (ref 0–0.7)
EOSINOPHIL NFR BLD AUTO: 2 %
ERYTHROCYTE [DISTWIDTH] IN BLOOD BY AUTOMATED COUNT: 12.3 % (ref 10–15)
GLUCOSE SERPL-MCNC: 96 MG/DL (ref 70–99)
HCO3 SERPL-SCNC: 23 MMOL/L (ref 22–29)
HCT VFR BLD AUTO: 42.8 % (ref 40–53)
HGB BLD-MCNC: 14.7 G/DL (ref 13.3–17.7)
HOLD SPECIMEN: NORMAL
HOLD SPECIMEN: NORMAL
IMM GRANULOCYTES # BLD: 0 10E3/UL
IMM GRANULOCYTES NFR BLD: 0 %
INTERPRETATION ECG - MUSE: NORMAL
LYMPHOCYTES # BLD AUTO: 1.9 10E3/UL (ref 0.8–5.3)
LYMPHOCYTES NFR BLD AUTO: 28 %
MCH RBC QN AUTO: 29.6 PG (ref 26.5–33)
MCHC RBC AUTO-ENTMCNC: 34.3 G/DL (ref 31.5–36.5)
MCV RBC AUTO: 86 FL (ref 78–100)
MONOCYTES # BLD AUTO: 0.3 10E3/UL (ref 0–1.3)
MONOCYTES NFR BLD AUTO: 5 %
NEUTROPHILS # BLD AUTO: 4.4 10E3/UL (ref 1.6–8.3)
NEUTROPHILS NFR BLD AUTO: 65 %
NRBC # BLD AUTO: 0 10E3/UL
NRBC BLD AUTO-RTO: 0 /100
P AXIS - MUSE: 37 DEGREES
PLATELET # BLD AUTO: 223 10E3/UL (ref 150–450)
POTASSIUM SERPL-SCNC: 3.8 MMOL/L (ref 3.4–5.3)
PR INTERVAL - MUSE: 190 MS
QRS DURATION - MUSE: 92 MS
QT - MUSE: 372 MS
QTC - MUSE: 420 MS
R AXIS - MUSE: -21 DEGREES
RBC # BLD AUTO: 4.97 10E6/UL (ref 4.4–5.9)
SODIUM SERPL-SCNC: 135 MMOL/L (ref 135–145)
SYSTOLIC BLOOD PRESSURE - MUSE: NORMAL MMHG
T AXIS - MUSE: 44 DEGREES
TROPONIN T SERPL HS-MCNC: <6 NG/L
VENTRICULAR RATE- MUSE: 77 BPM
WBC # BLD AUTO: 6.8 10E3/UL (ref 4–11)

## 2024-12-26 PROCEDURE — 80048 BASIC METABOLIC PNL TOTAL CA: CPT | Performed by: EMERGENCY MEDICINE

## 2024-12-26 PROCEDURE — 82374 ASSAY BLOOD CARBON DIOXIDE: CPT | Performed by: EMERGENCY MEDICINE

## 2024-12-26 PROCEDURE — 85025 COMPLETE CBC W/AUTO DIFF WBC: CPT | Performed by: EMERGENCY MEDICINE

## 2024-12-26 PROCEDURE — 84484 ASSAY OF TROPONIN QUANT: CPT | Performed by: EMERGENCY MEDICINE

## 2024-12-26 PROCEDURE — 71046 X-RAY EXAM CHEST 2 VIEWS: CPT

## 2024-12-26 PROCEDURE — 36415 COLL VENOUS BLD VENIPUNCTURE: CPT | Performed by: EMERGENCY MEDICINE

## 2024-12-26 ASSESSMENT — COLUMBIA-SUICIDE SEVERITY RATING SCALE - C-SSRS
1. IN THE PAST MONTH, HAVE YOU WISHED YOU WERE DEAD OR WISHED YOU COULD GO TO SLEEP AND NOT WAKE UP?: NO
2. HAVE YOU ACTUALLY HAD ANY THOUGHTS OF KILLING YOURSELF IN THE PAST MONTH?: NO
6. HAVE YOU EVER DONE ANYTHING, STARTED TO DO ANYTHING, OR PREPARED TO DO ANYTHING TO END YOUR LIFE?: NO

## 2024-12-26 ASSESSMENT — ACTIVITIES OF DAILY LIVING (ADL)
ADLS_ACUITY_SCORE: 49
ADLS_ACUITY_SCORE: 49

## 2024-12-26 NOTE — ED TRIAGE NOTES
Pt arrives complaining of chest pain for 1.5 weeks with upper chest pain that intermittently down left arm. Today had some back pain, burning.      Triage Assessment (Adult)       Row Name 12/26/24 0332          Triage Assessment    Airway WDL WDL        Respiratory WDL    Respiratory WDL X  sob with excertion        Skin Circulation/Temperature WDL    Skin Circulation/Temperature WDL WDL        Cardiac WDL    Cardiac WDL X;chest pain;all        Chest Pain Assessment    Chest Pain Location anterior chest, right;anterior chest, left     Chest Pain Radiation arm     Character tightness     Duration 1.5 weeks     Precipitating Factors physical exertion     Chest Pain Intervention 12-lead ECG obtained        Peripheral/Neurovascular WDL    Peripheral Neurovascular WDL WDL        Cognitive/Neuro/Behavioral WDL    Cognitive/Neuro/Behavioral WDL WDL

## 2024-12-26 NOTE — ED PROVIDER NOTES
"  Emergency Department Note      History of Present Illness     Chief Complaint   Chest Pain      HPI   Keo Pinto is a 39 year old male with a history of hypertension who presents to the ED for evaluation of chest pain. The patient reports experiencing constant chest stiffness and intermittent sharp pains in his left wrist for the last week. He states that this morning it began radiating to his upper back causing discomfort, prompting today's visit. He rates his current pain as a 3/10. He endorses taking daily blood pressure medication and Lexapro. He denies any pain or stiffness with exertion, shortness of breath, recent surgeries, leg swelling, or history of blood clots.     Independent Historian   None    Review of External Notes   ***    Past Medical History     Medical History and Problem List   Depression  Esophageal reflux  Gallstone pancreatitis  Hypertension  Migraine headaches  Class 3 obesity  Anxiety  Insomnia  Suicidal ideation    Medications   Norvasc  Lexapro  Zestril  Desyrel    Surgical History   Adenoidectomy  Endoscopic retrograde cholangiopancreatogram  EGD, combined  Laparoscopic cholecystectomy  PE tubes    Physical Exam     Patient Vitals for the past 24 hrs:   BP Temp Temp src Pulse Resp SpO2 Height Weight   12/26/24 0335 (!) 170/109 97.3  F (36.3  C) Temporal 84 18 99 % 1.803 m (5' 11\") (!) 172.4 kg (380 lb)     Physical Exam  ***    Diagnostics     Lab Results   Labs Ordered and Resulted from Time of ED Arrival to Time of ED Departure   BASIC METABOLIC PANEL - Normal       Result Value    Sodium 135      Potassium 3.8      Chloride 100      Carbon Dioxide (CO2) 23      Anion Gap 12      Urea Nitrogen 9.3      Creatinine 0.93      GFR Estimate >90      Calcium 9.0      Glucose 96     TROPONIN T, HIGH SENSITIVITY - Normal    Troponin T, High Sensitivity <6     CBC WITH PLATELETS AND DIFFERENTIAL    WBC Count 6.8      RBC Count 4.97      Hemoglobin 14.7      Hematocrit 42.8      MCV 86   "    MCH 29.6      MCHC 34.3      RDW 12.3      Platelet Count 223      % Neutrophils 65      % Lymphocytes 28      % Monocytes 5      % Eosinophils 2      % Basophils 0      % Immature Granulocytes 0      NRBCs per 100 WBC 0      Absolute Neutrophils 4.4      Absolute Lymphocytes 1.9      Absolute Monocytes 0.3      Absolute Eosinophils 0.1      Absolute Basophils 0.0      Absolute Immature Granulocytes 0.0      Absolute NRBCs 0.0         Imaging   Chest XR,  PA & LAT   Final Result   IMPRESSION: Negative chest.          EKG   ECG taken at 0328, ECG read at 0356  Normal sinus rhythm  Normal ECG   Rate 77 bpm. NJ interval 190 ms. QRS duration 92 ms. QT/QTc 372/420 ms. P-R-T axes 37 -21 44.    Independent Interpretation   CXR: {CXR EPPA:635342}.    ED Course      Medications Administered   Medications - No data to display    Procedures   Procedures     Discussion of Management   None    ED Course   ED Course as of 12/26/24 0509   Thu Dec 26, 2024   0352 I obtained history and examined the patient as noted above         Additional Documentation  None    Medical Decision Making / Diagnosis     CMS Diagnoses: None    MIPS       None    MDM   Keo Pinto is a 39 year old male ***    Disposition   The patient was discharged.     Diagnosis     ICD-10-CM    1. Atypical chest pain  R07.89            Discharge Medications   New Prescriptions    No medications on file         Scribe Disclosure:  I, Brooks Reyes, am serving as a scribe at 4:01 AM on 12/26/2024 to document services personally performed by Kings Sahu MD based on my observations and the provider's statements to me.

## 2024-12-26 NOTE — DISCHARGE INSTRUCTIONS
We have performed heart and lung testing which are all normal.  Chest heaviness and pain is sometimes not answered in the emergency room if pain becomes for severe or you are much more short of breath return to the emergency room for reassessment.  Please follow-up with your regular doctor to discuss further workup.  Thanks for your patience tonight and have camila Baltazar.

## 2024-12-30 ENCOUNTER — PATIENT OUTREACH (OUTPATIENT)
Dept: CARE COORDINATION | Facility: CLINIC | Age: 39
End: 2024-12-30
Payer: COMMERCIAL

## 2024-12-30 NOTE — LETTER
Keo Pinto  [address] [place] [address]    Dear Keo Pinto,      I am a team member within the Connected Care Resource Center with [facility]. I recently tried to reach you to ensure you were doing well following a recent visit within our health system. I also wanted to take this chance to introduce Clinic Care Coordination.     Below is a description of Clinic Care Coordination and how this team can further assist you:       The Clinic Care Coordination team is made up of a Registered Nurse, , Financial Resource Worker, and a Community Health Worker who understand and can help navigate the health care system. The goal of clinic care coordination is to help you manage your health, improve access to care, and achieve optimal health outcomes. They work alongside your provider to assist you in determining your health and social needs, obtain health care and community resources, and provide you with necessary information and education. Clinic Care Coordination can work with you through any barriers and develop a care plan that helps coordinate and strengthen the relationship between you and your care team.    If you wish to connect with the Clinic Care Coordination Team, please let your [facility] Primary Care Provider or Clinic Care Team know and they can place a referral. The Clinic Care Coordination team will then reach out by phone to further support you.    We are focused on providing you with the highest-quality healthcare experience possible.    Sincerely,   Barbara CRISTINA  Community Health Worker    Connected Care Resources   [facility]'s [address] [place] [address] ([phone]).

## 2024-12-30 NOTE — PROGRESS NOTES
[facility] Care Resource Center Contact  [facility]/Voicemail     Clinical Data: Care Coordination ED-sourced Outreach-     Outreach attempted x 2.  Left message on patient's voicemail, providing [facility]'s 24/7 scheduling and nurse triage phone number [phone] ([phone]) for questions/concerns and/or to schedule an appt with an [facility] provider.      Care Coordination introduction letter with explanation of Clinic Care Coordination services sent to patient via [employer]. Clinic Care Coordination services remain available via referral if needed.    Plan: [facility] will do no further outreaches at this time.       IRIS Valdivia  [facility] Resource [place], [facility]    *Connected Care Resource Team does NOT follow patient ongoing. Referrals are identified based on internal discharge reports and the outreach is to ensure patient has an understanding of their discharge instructions.

## 2025-01-09 ENCOUNTER — ALLIED HEALTH/NURSE VISIT (OUTPATIENT)
Dept: PEDIATRICS | Facility: CLINIC | Age: 40
End: 2025-01-09
Payer: COMMERCIAL

## 2025-01-09 DIAGNOSIS — Z23 ENCOUNTER FOR IMMUNIZATION: Primary | ICD-10-CM

## 2025-01-09 NOTE — PROGRESS NOTES
Prior to immunization administration, verified patients identity using patient s name and date of birth. Please see Immunization Activity for additional information.     Is the patient's temperature normal (100.5 or less)? Yes     Patient MEETS CRITERIA. PROCEED with vaccine administration.        1/9/2025   General Questionnaire    Do you have any questions for your care team about the vaccines you will be receiving today? Farzaneh had the other hep vaccines, not sure if im due for the rest of them.             1/9/2025   Hepatitis B   Have you had a serious reaction to a hepatitis B vaccine or to something in a hepatitis B vaccine, including yeast)? No   Are you getting kidney dialysis (either peritoneal or hemodialysis)? No   Do you have an allergy to latex? No         Patient MEETS CRITERIA. PROCEED with vaccine administration.        Patient instructed to remain in clinic for 15 minutes afterwards, and to report any adverse reactions.      Link to Ancillary Visit Immunization Standing Orders SmartSet     Screening performed by Amina Pride CMA on 1/9/2025 at 1:45 PM.

## 2025-01-15 ENCOUNTER — TELEPHONE (OUTPATIENT)
Dept: PEDIATRICS | Facility: CLINIC | Age: 40
End: 2025-01-15
Payer: COMMERCIAL

## 2025-01-15 NOTE — LETTER
2025       To: [employer] Prior Authorization  Fax: [phone]        Re:  Keo Pinto  :  1985  Insurance   Primary: [employer]US- [employer] [place]  Insurance ID:  [id number]        To Whom It May Concern:     I am the primary care doctor for Keo Pinto.     He is being treated for obesity with semaglutide. We attempted a prior authorization to allow him to remain at the 1 mg dose going forward. A request for this was also sent in November.      He tried 1.7 mg but developed side effects (prescription sent 2024).      He continues to lose weight with the 1 mg dose.     Please allow him to remain at 1 mg dosing going forward.            Cordially,              Tian Perze MD

## 2025-01-15 NOTE — TELEPHONE ENCOUNTER
PA Initiation    Medication: WEGOVY 1 MG/0.5ML SC SOAJ  Insurance Company: [employer] - Phone [phone] Fax [phone]  Pharmacy Filling the Rx: [id number] - [place], [place] - [address] AT [facility]  Filling Pharmacy Phone: [phone]  Filling Pharmacy Fax:    Start Date: 1/15/2025

## 2025-01-17 ENCOUNTER — HOSPITAL ENCOUNTER (EMERGENCY)
Facility: CLINIC | Age: 40
Discharge: HOME | End: 2025-01-17
Attending: EMERGENCY MEDICINE | Admitting: EMERGENCY MEDICINE
Payer: COMMERCIAL

## 2025-01-17 VITALS
BODY MASS INDEX: 44.1 KG/M2 | DIASTOLIC BLOOD PRESSURE: 80 MMHG | HEART RATE: 79 BPM | OXYGEN SATURATION: 98 % | TEMPERATURE: 98.2 F | WEIGHT: 315 LBS | RESPIRATION RATE: 18 BRPM | SYSTOLIC BLOOD PRESSURE: 136 MMHG | HEIGHT: 71 IN

## 2025-01-17 DIAGNOSIS — R19.7 NAUSEA VOMITING AND DIARRHEA: ICD-10-CM

## 2025-01-17 DIAGNOSIS — R11.2 NAUSEA VOMITING AND DIARRHEA: ICD-10-CM

## 2025-01-17 LAB
ALBUMIN SERPL BCG-MCNC: 4.5 G/DL (ref 3.5–5.2)
ALP SERPL-CCNC: 88 U/L (ref 40–150)
ALT SERPL W P-5'-P-CCNC: 38 U/L (ref 0–70)
ANION GAP SERPL CALCULATED.3IONS-SCNC: 13 MMOL/L (ref 7–15)
AST SERPL W P-5'-P-CCNC: 24 U/L (ref 0–45)
BASOPHILS # BLD AUTO: 0 10E3/UL (ref 0–0.2)
BASOPHILS NFR BLD AUTO: 0 %
BILIRUB SERPL-MCNC: 0.5 MG/DL
BUN SERPL-MCNC: 8 MG/DL (ref 6–20)
CALCIUM SERPL-MCNC: 9.5 MG/DL (ref 8.8–10.4)
CHLORIDE SERPL-SCNC: 103 MMOL/L (ref 98–107)
CREAT SERPL-MCNC: 1.01 MG/DL (ref 0.67–1.17)
EGFRCR SERPLBLD CKD-EPI 2021: >90 ML/MIN/1.73M2
EOSINOPHIL # BLD AUTO: 0.3 10E3/UL (ref 0–0.7)
EOSINOPHIL NFR BLD AUTO: 3 %
ERYTHROCYTE [DISTWIDTH] IN BLOOD BY AUTOMATED COUNT: 12.4 % (ref 10–15)
FLUAV RNA SPEC QL NAA+PROBE: NEGATIVE
FLUBV RNA RESP QL NAA+PROBE: NEGATIVE
GLUCOSE SERPL-MCNC: 104 MG/DL (ref 70–99)
HCO3 SERPL-SCNC: 23 MMOL/L (ref 22–29)
HCT VFR BLD AUTO: 49.3 % (ref 40–53)
HGB BLD-MCNC: 16.8 G/DL (ref 13.3–17.7)
HOLD SPECIMEN: NORMAL
HOLD SPECIMEN: NORMAL
IMM GRANULOCYTES # BLD: 0 10E3/UL
IMM GRANULOCYTES NFR BLD: 0 %
LIPASE SERPL-CCNC: 23 U/L (ref 13–60)
LYMPHOCYTES # BLD AUTO: 1.9 10E3/UL (ref 0.8–5.3)
LYMPHOCYTES NFR BLD AUTO: 21 %
MAGNESIUM SERPL-MCNC: 2 MG/DL (ref 1.7–2.3)
MCH RBC QN AUTO: 29.5 PG (ref 26.5–33)
MCHC RBC AUTO-ENTMCNC: 34.1 G/DL (ref 31.5–36.5)
MCV RBC AUTO: 87 FL (ref 78–100)
MONOCYTES # BLD AUTO: 0.5 10E3/UL (ref 0–1.3)
MONOCYTES NFR BLD AUTO: 5 %
NEUTROPHILS # BLD AUTO: 6.3 10E3/UL (ref 1.6–8.3)
NEUTROPHILS NFR BLD AUTO: 70 %
NRBC # BLD AUTO: 0 10E3/UL
NRBC BLD AUTO-RTO: 0 /100
PLATELET # BLD AUTO: 283 10E3/UL (ref 150–450)
POTASSIUM SERPL-SCNC: 4.4 MMOL/L (ref 3.4–5.3)
PROT SERPL-MCNC: 7.8 G/DL (ref 6.4–8.3)
RBC # BLD AUTO: 5.7 10E6/UL (ref 4.4–5.9)
RSV RNA SPEC NAA+PROBE: NEGATIVE
SARS-COV-2 RNA RESP QL NAA+PROBE: NEGATIVE
SODIUM SERPL-SCNC: 139 MMOL/L (ref 135–145)
WBC # BLD AUTO: 9.1 10E3/UL (ref 4–11)

## 2025-01-17 PROCEDURE — 85025 COMPLETE CBC W/AUTO DIFF WBC: CPT | Performed by: EMERGENCY MEDICINE

## 2025-01-17 PROCEDURE — 80053 COMPREHEN METABOLIC PANEL: CPT | Performed by: EMERGENCY MEDICINE

## 2025-01-17 PROCEDURE — 36415 COLL VENOUS BLD VENIPUNCTURE: CPT | Performed by: EMERGENCY MEDICINE

## 2025-01-17 PROCEDURE — 85041 AUTOMATED RBC COUNT: CPT | Performed by: EMERGENCY MEDICINE

## 2025-01-17 PROCEDURE — 96374 THER/PROPH/DIAG INJ IV PUSH: CPT

## 2025-01-17 PROCEDURE — 250N000011 HC RX IP 250 OP 636: Performed by: EMERGENCY MEDICINE

## 2025-01-17 PROCEDURE — 83690 ASSAY OF LIPASE: CPT | Performed by: EMERGENCY MEDICINE

## 2025-01-17 PROCEDURE — 96375 TX/PRO/DX INJ NEW DRUG ADDON: CPT

## 2025-01-17 PROCEDURE — 96361 HYDRATE IV INFUSION ADD-ON: CPT

## 2025-01-17 PROCEDURE — 85004 AUTOMATED DIFF WBC COUNT: CPT | Performed by: EMERGENCY MEDICINE

## 2025-01-17 PROCEDURE — 99284 EMERGENCY DEPT VISIT MOD MDM: CPT | Mod: 25

## 2025-01-17 PROCEDURE — 258N000003 HC RX IP 258 OP 636: Performed by: EMERGENCY MEDICINE

## 2025-01-17 PROCEDURE — 83735 ASSAY OF MAGNESIUM: CPT | Performed by: EMERGENCY MEDICINE

## 2025-01-17 PROCEDURE — 87637 SARSCOV2&INF A&B&RSV AMP PRB: CPT | Performed by: EMERGENCY MEDICINE

## 2025-01-17 RX ORDER — ONDANSETRON 2 MG/ML
4 INJECTION INTRAMUSCULAR; INTRAVENOUS ONCE
Status: COMPLETED | OUTPATIENT
Start: 2025-01-17 | End: 2025-01-17

## 2025-01-17 RX ADMIN — FAMOTIDINE 20 MG: 10 INJECTION, SOLUTION INTRAVENOUS at 06:23

## 2025-01-17 RX ADMIN — ONDANSETRON 4 MG: 2 INJECTION INTRAMUSCULAR; INTRAVENOUS at 05:16

## 2025-01-17 RX ADMIN — SODIUM CHLORIDE 1000 ML: 9 INJECTION, SOLUTION INTRAVENOUS at 05:16

## 2025-01-17 ASSESSMENT — ACTIVITIES OF DAILY LIVING (ADL)
ADLS_ACUITY_SCORE: 49

## 2025-01-17 NOTE — ED TRIAGE NOTES
Arrives from home with complaints of nausea, vomiting and diarrhea since 1500 on 1/16. Pt unsure if side effect from Wegovy injection which he took on Monday. No new dosage changes. Has had 3 other injections of the same dose without any issue. Denies anyone at home being sick. States was using ODT Zofran, but vomited 15-20 min after taking dose. Last dose 0345. Denies having any fevers.     Triage Assessment (Adult)       Row Name 01/17/25 5321          Triage Assessment    Airway WDL WDL        Respiratory WDL    Respiratory WDL WDL        Skin Circulation/Temperature WDL    Skin Circulation/Temperature WDL WDL        Cardiac WDL    Cardiac WDL WDL        Peripheral/Neurovascular WDL    Peripheral Neurovascular WDL WDL        Cognitive/Neuro/Behavioral WDL    Cognitive/Neuro/Behavioral WDL WDL

## 2025-01-17 NOTE — ED PROVIDER NOTES
Emergency Department Note      History of Present Illness     Chief Complaint   Nausea, Vomiting, & Diarrhea      HPI   Keo Pinto is a 39 year old male who presents with nausea, vomiting, and diarrhea.  Patient reports concerns that symptoms could be due to Wegovy.  He took his last dose on Monday  (4 days ago).  He states before that he had more symptoms after increasing to the 1.7 mg dose and he has been back down to the 1 mg dose for the last 3 weeks.  He tried taking Zofran at home without relief.  He states he did not eat anything abnormal yesterday and had a lot of fresh fruit.  He denies any sick contacts.  Denies fevers.    Independent Historian   none    Review of External Notes   Reviewed telephone nurse triage note from before arrival.    Past Medical History     Medical History and Problem List   Past Medical History:   Diagnosis Date    Depression, unspecified 11/19/2024    Esophageal reflux     Gallstone pancreatitis 2/28/2019    Hypertension     Migraine headache     Obese        Medications   ALPRAZolam (XANAX) 0.5 MG tablet  amLODIPine (NORVASC) 5 MG tablet  escitalopram (LEXAPRO) 10 MG tablet  lisinopril (ZESTRIL) 40 MG tablet  metoprolol succinate ER (TOPROL XL) 200 MG 24 hr tablet  ondansetron (ZOFRAN ODT) 4 MG ODT tab  Semaglutide-Weight Management (WEGOVY) 1 MG/0.5ML pen  traZODone (DESYREL) 50 MG tablet        Surgical History   Past Surgical History:   Procedure Laterality Date    ADENOIDECTOMY      ENDOSCOPIC RETROGRADE CHOLANGIOPANCREATOGRAM N/A 2/28/2019    Procedure: ENDOSCOPIC RETROGRADE CHOLANGIOPANCREATOGRAM with Sphincterotomy and Stone Extraction.;  Surgeon: Amina Hernandez MD;  Location:  OR    ESOPHAGOSCOPY, GASTROSCOPY, DUODENOSCOPY (EGD), COMBINED N/A 2/28/2019    Procedure: COMBINED ENDOSCOPIC ULTRASOUND, Endoscopic Retrograde Cholangiopancreatoscopy;  Surgeon: Amina Hernandez MD;  Location:  OR    LAPAROSCOPIC CHOLECYSTECTOMY N/A 3/3/2019     "Procedure: LAPAROSCOPIC CHOLECYSTECTOMY;  Surgeon: Leland Crowder MD;  Location:  OR    PE TUBES      As a young child       Physical Exam     Patient Vitals for the past 24 hrs:   BP Temp Temp src Pulse Resp SpO2 Height Weight   01/17/25 0443 (!) 151/109 98.2  F (36.8  C) Temporal 79 18 100 % 1.803 m (5' 11\") (!) 173.7 kg (382 lb 15 oz)     Physical Exam  Nursing note and vitals reviewed.  HENT:   Mouth/Throat: Moist mucous membranes.   Eyes: EOMI, nonicteric sclera  Cardiovascular: Normal rate, regular rhythm, no murmurs, rubs, or gallops  Pulmonary/Chest: Effort normal and breath sounds normal. No respiratory distress. No wheezes. No rales.   Abdominal: Soft. Nontender, nondistended, no guarding or rigidity.   Musculoskeletal: Normal range of motion.   Neurological: Alert. Moves all extremities spontaneously.   Skin: Skin is warm and dry. No rash noted.         Diagnostics     Lab Results   Labs Ordered and Resulted from Time of ED Arrival to Time of ED Departure   COMPREHENSIVE METABOLIC PANEL - Abnormal       Result Value    Sodium 139      Potassium 4.4      Carbon Dioxide (CO2) 23      Anion Gap 13      Urea Nitrogen 8.0      Creatinine 1.01      GFR Estimate >90      Calcium 9.5      Chloride 103      Glucose 104 (*)     Alkaline Phosphatase 88      AST 24      ALT 38      Protein Total 7.8      Albumin 4.5      Bilirubin Total 0.5     MAGNESIUM - Normal    Magnesium 2.0     LIPASE - Normal    Lipase 23     INFLUENZA A/B, RSV AND SARS-COV2 PCR - Normal    Influenza A PCR Negative      Influenza B PCR Negative      RSV PCR Negative      SARS CoV2 PCR Negative     CBC WITH PLATELETS AND DIFFERENTIAL    WBC Count 9.1      RBC Count 5.70      Hemoglobin 16.8      Hematocrit 49.3      MCV 87      MCH 29.5      MCHC 34.1      RDW 12.4      Platelet Count 283      % Neutrophils 70      % Lymphocytes 21      % Monocytes 5      % Eosinophils 3      % Basophils 0      % Immature Granulocytes 0      NRBCs per 100 " WBC 0      Absolute Neutrophils 6.3      Absolute Lymphocytes 1.9      Absolute Monocytes 0.5      Absolute Eosinophils 0.3      Absolute Basophils 0.0      Absolute Immature Granulocytes 0.0      Absolute NRBCs 0.0           Independent Interpretation   None    ED Course      Medications Administered   Medications   sodium chloride 0.9% BOLUS 1,000 mL (1,000 mLs Intravenous $New Bag 1/17/25 1206)   ondansetron (ZOFRAN) injection 4 mg (4 mg Intravenous $Given 1/17/25 3235)   famotidine (PEPCID) injection 20 mg (20 mg Intravenous $Given 1/17/25 0623)       Procedures   Procedures     Discussion of Management   None    ED Course   The patient arrived in triage where vitals were measured and recorded.   The patient was then escorted back to the emergency department.   The patient's medical records were reviewed.  Nursing notes and vitals were reviewed.    I performed an exam of the patient as documented above. The patient is in agreement with my plan of care.       Additional Documentation  None    Medical Decision Making / Diagnosis     CMS Diagnoses: None    MIPS       None    MDM   Keo Pinto is a 39 year old male who presents with nausea, vomiting, and diarrhea.  Broad differential of course considered including side effects from Wegovy, viral illness, bacterial illness, bowel obstruction, among many other etiologies.  His vital signs here are normal other than mild hypertension.  His abdominal exam is completely benign reassuring against emergent/surgical intra-abdominal pathology.  Labs unremarkable.  After receiving fluids and meds as above, he is feeling symptomatically improved.  He passed p.o. challenge.  We discussed possibility of Wegovy causing his symptoms versus viral illness.  I recommended that he take a week off of the Wegovy just in case. He is in stable condition at the time of discharge, red flags that should merit ED return were discussed as well as recommended follow-up instructions. All  questions were answered and he is in agreement with the plan.      Disposition   The patient was discharged.     Diagnosis     ICD-10-CM    1. Nausea vomiting and diarrhea  R11.2     R19.7                 Joseph Whitten MD  01/17/25 0737

## 2025-01-20 ENCOUNTER — TELEPHONE (OUTPATIENT)
Dept: PEDIATRICS | Facility: CLINIC | Age: 40
End: 2025-01-20
Payer: COMMERCIAL

## 2025-01-20 NOTE — TELEPHONE ENCOUNTER
Incoming call from Judy, technician with [employer]. Calling to confirm Wegovy 1mg rx quantity dispensed and SIG for prior authorization processing.      RN relayed the prescription info below:    Semaglutide-Weight Management (WEGOVY) 1 MG/0.5ML pen Inject 1 mg subcutaneously once a week. 2 mL 5 ordered 10/28/2024     Jaylyn TALBERT RN  [facility]

## 2025-01-20 NOTE — TELEPHONE ENCOUNTER
A letter of appeal for the 1 mg dose was generated and sent 11/12/24. Unclear why another PA was needed now.     A new letter of appeal is in my outbasket. Please fax.

## 2025-01-20 NOTE — TELEPHONE ENCOUNTER
MEDICATION APPEAL DENIED    Medication: WEGOVY 1 MG/0.5ML SC SOAJ  Insurance Company: [employer]  Denial Date: 1/20/2025  Denial Reason(s): 1mg is not a continuation dose- They previously just approved 2 more months at that dose. Patient will need to titrate up or switch medications.      Second Level Appeal Information:   Patient Notified: No  Central Prior Authorization Team ONLY: Second level appeals will be managed by the clinic staff and provider. Please contact the [employer] Prior Authorization Team if additional information about the denial is needed.

## 2025-01-20 NOTE — TELEPHONE ENCOUNTER
Forms/Letter Request    Type of form/letter: OTHER: NOTICE OF PRIOR AUTH DETERMINATION      Do we have the form/letter: Yes: Form is on the provider's desk for review    Who is the form from? [place] [employer]     Where did/will the form come from? form was faxed in    How would you like the form/letter returned: Fax : [phone]

## 2025-01-20 NOTE — TELEPHONE ENCOUNTER
[facility] Pool1 hour ago (1:08 PM)       Hello- This PA has been denied, please see denial rationale.  Second level appeals will be managed by the clinic staff and provider. Please contact the [employer] Prior Authorization Team if additional information about the denial is needed.    If done with encounter, please notify the patient and close the encounter.  Thank you,  ARIANA Carvajal Specialist

## 2025-02-19 ENCOUNTER — MYC MEDICAL ADVICE (OUTPATIENT)
Dept: PEDIATRICS | Facility: CLINIC | Age: 40
End: 2025-02-19
Payer: COMMERCIAL

## 2025-02-19 NOTE — TELEPHONE ENCOUNTER
Please see patient MyChart message  -is inquiring if it's safe to eat grapefruit/citrus with Stan meds metoprolol, lisinopril, and amlodipine      Dr. Perez please review and advise.    Ashley Garrison RN     EOAE (evoked otoacoustic emission)

## 2025-03-27 DIAGNOSIS — E66.01 MORBID OBESITY (H): ICD-10-CM

## 2025-03-27 RX ORDER — SEMAGLUTIDE 0.5 MG/.5ML
INJECTION, SOLUTION SUBCUTANEOUS
Qty: 2 ML | Refills: 0 | Status: SHIPPED | OUTPATIENT
Start: 2025-03-27

## 2025-04-16 ENCOUNTER — VIRTUAL VISIT (OUTPATIENT)
Dept: PEDIATRICS | Facility: CLINIC | Age: 40
End: 2025-04-16
Payer: COMMERCIAL

## 2025-04-16 DIAGNOSIS — E66.01 MORBID OBESITY (H): Primary | ICD-10-CM

## 2025-04-16 DIAGNOSIS — I10 ESSENTIAL HYPERTENSION: ICD-10-CM

## 2025-04-16 DIAGNOSIS — R11.0 NAUSEA: ICD-10-CM

## 2025-04-16 PROCEDURE — 98006 SYNCH AUDIO-VIDEO EST MOD 30: CPT | Performed by: INTERNAL MEDICINE

## 2025-04-16 RX ORDER — METOPROLOL SUCCINATE 200 MG/1
TABLET, EXTENDED RELEASE ORAL
Qty: 90 TABLET | Refills: 3 | Status: SHIPPED | OUTPATIENT
Start: 2025-04-16

## 2025-04-16 RX ORDER — LISINOPRIL 40 MG/1
40 TABLET ORAL DAILY
Qty: 90 TABLET | Refills: 3 | Status: SHIPPED | OUTPATIENT
Start: 2025-04-16

## 2025-04-16 RX ORDER — SEMAGLUTIDE 0.5 MG/.5ML
0.5 INJECTION, SOLUTION SUBCUTANEOUS WEEKLY
Qty: 6 ML | Refills: 1 | Status: SHIPPED | OUTPATIENT
Start: 2025-04-16

## 2025-04-16 RX ORDER — AMLODIPINE BESYLATE 5 MG/1
5 TABLET ORAL DAILY
Qty: 90 TABLET | Refills: 3 | Status: SHIPPED | OUTPATIENT
Start: 2025-04-16

## 2025-04-16 RX ORDER — ONDANSETRON 4 MG/1
4 TABLET, ORALLY DISINTEGRATING ORAL EVERY 8 HOURS PRN
Qty: 20 TABLET | Refills: 1 | Status: SHIPPED | OUTPATIENT
Start: 2025-04-16

## 2025-04-16 NOTE — PROGRESS NOTES
"Maximilian is a 39 year old who is being evaluated via a billable video visit.        Assessment & Plan       ICD-10-CM    1. Morbid obesity (H)  E66.01 Semaglutide-Weight Management (WEGOVY) 0.5 MG/0.5ML pen      2. Essential hypertension  I10 amLODIPine (NORVASC) 5 MG tablet     lisinopril (ZESTRIL) 40 MG tablet     metoprolol succinate ER (TOPROL XL) 200 MG 24 hr tablet      3. Nausea  R11.0 ondansetron (ZOFRAN ODT) 4 MG ODT tab        Morbid obesity. BMI remains 53.   Has not tolerated 1 mg dosing of Ozempic - severe GI side effects.   Is tolerating 0.5 mg dose.  Overall weight loss thus far 76#.   For now, continue with 0.5 mg dosing.   If in the future he would like to try 1 mg dosing he may contact me.    HTN. Continue w/ current medications.    Nausea. Continue ondansetron prn.       Recommend follow-up with me in about 6 months.    Tian Perez MD          Kemar Yao is a 39 year old, presenting for the following health issues:  No chief complaint on file.      Video Start Time: 3:27 PM    History of Present Illness       Reason for visit:  Wegovy Followup    He eats 2-3 servings of fruits and vegetables daily.He consumes 1 sweetened beverage(s) daily.He exercises with enough effort to increase his heart rate 20 to 29 minutes per day.  He exercises with enough effort to increase his heart rate 3 or less days per week.   He is taking medications regularly.        Morbid obesity follow-up.  Currently treating with Wegovy.     Weight at home today 380#.    Wt Readings from Last 4 Encounters:   01/17/25 (!) 173.7 kg (382 lb 15 oz)   12/26/24 (!) 172.4 kg (380 lb)   11/19/24 (!) 177.4 kg (391 lb 1.6 oz)   08/29/24 (!) 188.7 kg (415 lb 14.4 oz)     Estimated body mass index is 53.41 kg/m  as calculated from the following:    Height as of 1/17/25: 1.803 m (5' 11\").    Weight as of 1/17/25: 173.7 kg (382 lb 15 oz).    Has needed a decreased dose due to side effects with 1 mg dosing.  Had severe nausea / vomiting. " Needed ED evaluation.    Currently doing well with 0.5 mg dose. Typically takes ondansetron around the time of dosing the medication which has helped.     Also treated for HTN. No cardiac sx such as CP, palpitations, PND, orthopnea, PISANO or peripheral edema.  BP Readings from Last 3 Encounters:   01/17/25 136/80   12/26/24 (!) 170/109   11/20/24 133/87       Depression. Managed by psychiatry. Feels his mood is doing well overall.            Objective           Vitals:  No vitals were obtained today due to virtual visit.    Physical Exam   GEN: No distress  PSYCH: Normal affect. Well groomed. Good eye contact.           Video-Visit Details    Type of service:  Video Visit   Video End Time:3:42 PM  Originating Location (pt. Location): Home    Distant Location (provider location):  On-site  Platform used for Video Visit: Bing  Signed Electronically by: Tian Perez MD

## 2025-04-17 ENCOUNTER — TELEPHONE (OUTPATIENT)
Dept: PEDIATRICS | Facility: CLINIC | Age: 40
End: 2025-04-17
Payer: COMMERCIAL

## 2025-04-17 NOTE — TELEPHONE ENCOUNTER
Prior Authorization Retail Medication Request    Medication/Dose: Semaglutide-Weight Management (WEGOVY) 0.5 MG/0.5ML pen  Diagnosis and ICD code (if different than what is on RX):    New/renewal/insurance change PA/secondary ins. PA:  Previously Tried and Failed:    Rationale:      Insurance   Primary:   Insurance ID:  [id number]    Secondary (if applicable):  Insurance ID:      Pharmacy Information (if different than what is on RX)  Name:  [facility]  Phone:    Fax:    Clinic Information  Preferred routing pool for dept communication:

## 2025-04-17 NOTE — LETTER
2025  To: [employer] Prior Authorization  Fax: [phone]        Re:  Keo Pinto  :  1985  Insurance   Primary: BLUE PLUS- BLUE PLUS [facility]  Insurance ID:  [id number]         To Whom It May Concern:     I am the primary care doctor for Keo Pinto.     He is being treated for obesity with semaglutide.      He tried 1.7 mg but developed side effects (prescription sent 2024).      He also has experienced significant gastrointestinal side effects with the 1 mg dose.    He currently is treated with and continues to lose weight with the 0.5 mg dose.       Please allow him to remain at 0.5 mg dosing going forward.            Cordially,              Tian Perez MD

## 2025-04-22 NOTE — TELEPHONE ENCOUNTER
Per notes the patient is requesting to stay on the 0.5 mg dose of Wegovy. The patient's insurance plan follows the FDA approved titration schedule which only allows for 4 total doses (2 mL) of the 0.5 strength. After that the patient is expected to move up to the 1 mg strength.         If pursuing a PA for the 0.5 dose the PA team will need a letter of medical necessity from the physician specifically outlining why the patient is unable to move up to the 1mg dose.

## 2025-04-25 NOTE — TELEPHONE ENCOUNTER
Letter available in Epic. Routing back to PA team.  Sending patient mychart to schedule NO for Weight check.    Thank you kindly,  Sher BECKER

## 2025-04-25 NOTE — TELEPHONE ENCOUNTER
Appeal letter is written, in my outbasket.     Please contact pt to have him schedule either a nurse weight check or a visit with me. We may need a more recent weight than is currently on file.

## 2025-04-28 NOTE — TELEPHONE ENCOUNTER
Central Prior Authorization Team   Phone: [phone]    PA Initiation    Medication: Semaglutide-Weight Management (WEGOVY) 0.5 MG/0.5ML pen  Insurance Company: [employer] - Phone [phone] Fax [phone]  Pharmacy Filling the Rx:    Filling Pharmacy Phone:    Filling Pharmacy Fax:    Start Date: 4/22/2025    [facility] KEY: [id number]

## 2025-04-28 NOTE — TELEPHONE ENCOUNTER
MEDICATION APPEAL DENIED    Medication: Semaglutide-Weight Management (WEGOVY) 0.5 MG/0.5ML pen    Denial Date:  04/28/2025    Denial Rational:  Insurance will only approve 4 pens per 180 days as the FDA has not approved the 0.5 mg dosing as a maintenance  dose.  This was sent to appeals, and appeals upheld the decision to not cover any additional quantity.             Second Level Appeal Information:  This request now requires a state level appeal.  The PA team does not manage these appeals.     Second level appeals will be managed by the clinic staff and provider. Please contact the [facility] Prior Authorization Team if additional information about the denial is needed.

## 2025-07-13 ENCOUNTER — HEALTH MAINTENANCE LETTER (OUTPATIENT)
Age: 40
End: 2025-07-13

## (undated) DEVICE — ENDO POUCH UNIV RETRIEVAL SYSTEM INZII 10MM CD001

## (undated) DEVICE — ESU ELEC BLADE 2.75" COATED/INSULATED E1455

## (undated) DEVICE — GOWN LG DISP 9515

## (undated) DEVICE — LINEN FULL SHEET 5511

## (undated) DEVICE — PREP CHLORAPREP 26ML TINTED ORANGE  260815

## (undated) DEVICE — TUBING SUCTION 12"X1/4" N612

## (undated) DEVICE — SUCTION IRR STRYKERFLOW II W/TIP 250-070-520

## (undated) DEVICE — GLOVE PROTEXIS BLUE W/NEU-THERA 7.0  2D73EB70

## (undated) DEVICE — GLOVE PROTEXIS BLUE W/NEU-THERA 6.5  2D73EB65

## (undated) DEVICE — ENDO PROBE COVER ULTRASOUND BALLOON LATEX  MAJ-249

## (undated) DEVICE — LINEN HALF SHEET 5512

## (undated) DEVICE — SOL WATER IRRIG 1000ML BOTTLE 07139-09

## (undated) DEVICE — SU VICRYL 0 CT-2 CR 8X18" J727D

## (undated) DEVICE — GLOVE PROTEXIS POWDER FREE 8.0 ORTHOPEDIC 2D73ET80

## (undated) DEVICE — SUCTION CANISTER MEDIVAC LINER 3000ML W/LID 65651-530

## (undated) DEVICE — ESU GROUND PAD ADULT W/CORD E7507

## (undated) DEVICE — RAD RX ISOVUE 300 (50ML) 61% IOPAMIDOL CHARGE PER ML

## (undated) DEVICE — BALLOON EXTRACTION 3-LUMEN 15X190MM 2.8MM TL B-V233P-A

## (undated) DEVICE — SOL NACL 0.9% INJ 250ML BAG 2B1322Q

## (undated) DEVICE — SYR 10ML LL W/O NDL 302995

## (undated) DEVICE — ENDO TROCAR OPTICAL ACCESS KII Z-THRD 12X100MM C0R85

## (undated) DEVICE — LINEN TOWEL PACK X5 5464

## (undated) DEVICE — BAG CLEAR TRASH 1.3M 39X33" P4040C

## (undated) DEVICE — WIRE GUIDE 0.035"X450CM JAGWIRE HP STR TIP M00556580

## (undated) DEVICE — SOL WATER IRRIG 1000ML BOTTLE 2F7114

## (undated) DEVICE — APPLICATOR COTTON TIP 6"X2 STERILE LF 6012

## (undated) DEVICE — ENDO TROCAR FIRST ENTRY KII FIOS Z-THRD 05X100MM CTF03

## (undated) DEVICE — DRSG GAUZE 4X4" 8044

## (undated) DEVICE — DECANTER BAG 2002S

## (undated) DEVICE — SU VICRYL 4-0 P-3 18" UND J494G

## (undated) DEVICE — PACK ERCP CUSTOM RIDGES

## (undated) DEVICE — ESU PENCIL W/HOLSTER E2350H

## (undated) DEVICE — TAPE CLOTH ADHESIVE 3" LATEX FREE

## (undated) DEVICE — SOL NACL 0.9% IRRIG 3000ML BAG 2B7477

## (undated) DEVICE — PREP PAD ALCOHOL 6818

## (undated) DEVICE — GLOVE PROTEXIS POWDER FREE 6.5 ORTHOPEDIC 2D73ET65

## (undated) DEVICE — LINEN POUCH DBL 5427

## (undated) DEVICE — GOWN XLG DISP 9545

## (undated) DEVICE — ENDO TROCAR SLEEVE KII Z-THREADED 05X100MM CTS02

## (undated) DEVICE — SPHINCTEROTOME 7FRX25MM TRITOME G22555

## (undated) DEVICE — ESU CORD MONOPOLAR 10'  E0510

## (undated) DEVICE — Device

## (undated) DEVICE — GLOVE PROTEXIS POWDER FREE SMT 7.5  2D72PT75X

## (undated) RX ORDER — ONDANSETRON 2 MG/ML
INJECTION INTRAMUSCULAR; INTRAVENOUS
Status: DISPENSED
Start: 2019-03-03

## (undated) RX ORDER — BUPIVACAINE HYDROCHLORIDE AND EPINEPHRINE 5; 5 MG/ML; UG/ML
INJECTION, SOLUTION EPIDURAL; INTRACAUDAL; PERINEURAL
Status: DISPENSED
Start: 2019-03-03

## (undated) RX ORDER — FENTANYL CITRATE 50 UG/ML
INJECTION, SOLUTION INTRAMUSCULAR; INTRAVENOUS
Status: DISPENSED
Start: 2019-03-03

## (undated) RX ORDER — METOPROLOL TARTRATE 1 MG/ML
INJECTION, SOLUTION INTRAVENOUS
Status: DISPENSED
Start: 2019-03-03

## (undated) RX ORDER — CEFAZOLIN SODIUM IN 0.9 % NACL 3 G/100 ML
INTRAVENOUS SOLUTION, PIGGYBACK (ML) INTRAVENOUS
Status: DISPENSED
Start: 2019-03-03

## (undated) RX ORDER — HYDROMORPHONE HYDROCHLORIDE 1 MG/ML
INJECTION, SOLUTION INTRAMUSCULAR; INTRAVENOUS; SUBCUTANEOUS
Status: DISPENSED
Start: 2019-03-03

## (undated) RX ORDER — FENTANYL CITRATE 50 UG/ML
INJECTION, SOLUTION INTRAMUSCULAR; INTRAVENOUS
Status: DISPENSED
Start: 2019-02-28

## (undated) RX ORDER — PHENYLEPHRINE HCL IN 0.9% NACL 1 MG/10 ML
SYRINGE (ML) INTRAVENOUS
Status: DISPENSED
Start: 2019-03-03

## (undated) RX ORDER — GLYCOPYRROLATE 0.2 MG/ML
INJECTION INTRAMUSCULAR; INTRAVENOUS
Status: DISPENSED
Start: 2019-03-03

## (undated) RX ORDER — NEOSTIGMINE METHYLSULFATE 1 MG/ML
VIAL (ML) INJECTION
Status: DISPENSED
Start: 2019-03-03

## (undated) RX ORDER — PROPOFOL 10 MG/ML
INJECTION, EMULSION INTRAVENOUS
Status: DISPENSED
Start: 2019-03-03

## (undated) RX ORDER — LIDOCAINE HYDROCHLORIDE 10 MG/ML
INJECTION, SOLUTION EPIDURAL; INFILTRATION; INTRACAUDAL; PERINEURAL
Status: DISPENSED
Start: 2019-03-03

## (undated) RX ORDER — DEXAMETHASONE SODIUM PHOSPHATE 4 MG/ML
INJECTION, SOLUTION INTRA-ARTICULAR; INTRALESIONAL; INTRAMUSCULAR; INTRAVENOUS; SOFT TISSUE
Status: DISPENSED
Start: 2019-03-03